# Patient Record
Sex: FEMALE | Race: WHITE | Employment: OTHER | URBAN - METROPOLITAN AREA
[De-identification: names, ages, dates, MRNs, and addresses within clinical notes are randomized per-mention and may not be internally consistent; named-entity substitution may affect disease eponyms.]

---

## 2018-04-06 ENCOUNTER — OFFICE VISIT (OUTPATIENT)
Dept: INTERNAL MEDICINE CLINIC | Age: 58
End: 2018-04-06

## 2018-04-06 VITALS
HEIGHT: 67 IN | HEART RATE: 78 BPM | SYSTOLIC BLOOD PRESSURE: 147 MMHG | TEMPERATURE: 98.4 F | RESPIRATION RATE: 17 BRPM | DIASTOLIC BLOOD PRESSURE: 105 MMHG | BODY MASS INDEX: 34.53 KG/M2 | WEIGHT: 220 LBS | OXYGEN SATURATION: 97 %

## 2018-04-06 DIAGNOSIS — Z12.4 CERVICAL CANCER SCREENING: ICD-10-CM

## 2018-04-06 DIAGNOSIS — I10 ESSENTIAL HYPERTENSION: Primary | ICD-10-CM

## 2018-04-06 DIAGNOSIS — F41.8 ANXIETY WITH DEPRESSION: ICD-10-CM

## 2018-04-06 DIAGNOSIS — Z12.11 COLON CANCER SCREENING: ICD-10-CM

## 2018-04-06 RX ORDER — TIMOLOL MALEATE 5 MG/ML
SOLUTION/ DROPS OPHTHALMIC
COMMUNITY
Start: 2017-07-28

## 2018-04-06 RX ORDER — PAROXETINE 10 MG/1
10 TABLET, FILM COATED ORAL DAILY
Qty: 30 TAB | Refills: 0 | Status: SHIPPED | OUTPATIENT
Start: 2018-04-06 | End: 2018-04-23 | Stop reason: SDUPTHER

## 2018-04-06 RX ORDER — OMEPRAZOLE 20 MG/1
20 CAPSULE, DELAYED RELEASE ORAL DAILY
COMMUNITY

## 2018-04-06 RX ORDER — PAROXETINE 10 MG/1
10 TABLET, FILM COATED ORAL DAILY
Qty: 30 TAB | Refills: 0 | Status: SHIPPED | OUTPATIENT
Start: 2018-04-06 | End: 2018-04-06 | Stop reason: SDUPTHER

## 2018-04-06 RX ORDER — LISINOPRIL 20 MG/1
20 TABLET ORAL DAILY
Qty: 30 TAB | Refills: 1 | Status: SHIPPED | OUTPATIENT
Start: 2018-04-06 | End: 2018-05-07 | Stop reason: SDUPTHER

## 2018-04-06 RX ORDER — LISINOPRIL 20 MG/1
20 TABLET ORAL DAILY
Qty: 30 TAB | Refills: 1 | Status: SHIPPED | OUTPATIENT
Start: 2018-04-06 | End: 2018-04-06 | Stop reason: SDUPTHER

## 2018-04-06 NOTE — PROGRESS NOTES
Chief Complaint   Patient presents with    Hypertension     Pt who presents for new problem of hypertension. Diet and Lifestyle: not attempting to follow a low fat, low cholesterol diet, not attempting to follow a low sodium diet  Home BP Monitoring: is not measured at home  Use of agents associated with hypertension: none. Cardiovascular ROS: taking medications as instructed, no medication side effects noted, no TIA's, no chest pain on exertion, no dyspnea on exertion, no swelling of ankles. New concerns: weight gain. Reviewed and agree with Nurse Note and duplicated in this note. Reviewed PmHx, RxHx, FmHx, SocHx, AllgHx and updated and dated in the chart. No family history on file. No past medical history on file.    Social History     Social History    Marital status:      Spouse name: N/A    Number of children: N/A    Years of education: N/A     Social History Main Topics    Smoking status: Not on file    Smokeless tobacco: Not on file    Alcohol use Not on file    Drug use: Not on file    Sexual activity: Not on file     Other Topics Concern    Not on file     Social History Narrative    No narrative on file        Review of Systems - negative except as listed above      Objective:     Vitals:    04/06/18 0940   BP: (!) 147/105   Pulse: 78   Resp: 17   Temp: 98.4 °F (36.9 °C)   TempSrc: Oral   SpO2: 97%   Weight: 220 lb (99.8 kg)   Height: 5' 7\" (1.702 m)       Physical Examination: General appearance - alert, well appearing, and in no distress  Eyes - pupils equal and reactive, extraocular eye movements intact  Ears - bilateral TM's and external ear canals normal  Nose - normal and patent, no erythema, discharge or polyps  Mouth - mucous membranes moist, pharynx normal without lesions  Neck - supple, no significant adenopathy  Chest - clear to auscultation, no wheezes, rales or rhonchi, symmetric air entry  Heart - normal rate, regular rhythm, normal S1, S2, no murmurs, rubs, clicks or gallops  Abdomen - soft, nontender, nondistended, no masses or organomegaly  Skin - normal coloration and turgor, no rashes, no suspicious skin lesions noted    Assessment/ Plan:   Diagnoses and all orders for this visit:    1. Essential hypertension  -     METABOLIC PANEL, COMPREHENSIVE    2. Colon cancer screening  -     OCCULT BLOOD, IMMUNOASSAY (FIT)    3. Anxiety with depression    4. Cervical cancer screening  Sujatha Babb OB/GYN ref [facility]    Other orders  -     PARoxetine (PAXIL) 10 mg tablet; Take 1 Tab by mouth daily. -     lisinopril (PRINIVIL, ZESTRIL) 20 mg tablet; Take 1 Tab by mouth daily. Follow-up Disposition: Not on File  Patient was informed/counseled to:    1) Remember to stay active and/or exercise regularly (I suggest 30-45 minutes daily)   2) For reliable dietary information, go to www. [employer].org. You may wish to consider seeing the nutritionist at [facility] [phone], also consider the [address] [facility]. 3) I routinely suggest a complete physical exam once each year (your birth month)  I have discussed the diagnosis with the patient and the intended plan as seen in the above orders. The patient has received an after-visit summary and questions were answered concerning future plans. Medication Side Effects and Warnings were discussed with patient: yes  Patient Labs were reviewed and or requested: yes  Patient Past Records were reviewed and or requested  yes  I have discussed the diagnosis with the patient and the intended plan as seen in the above orders. The patient has received an after-visit summary and questions were answered concerning future plans. Pt agrees to call or return to clinic and/or go to closest ER with any worsening of symptoms. This may include, but not limited to increased fever (>100.4) with NSAIDS or Tylenol, increased edema, confusion, rash, worsening of presenting symptoms.

## 2018-04-06 NOTE — MR AVS SNAPSHOT
[address] [facility]. [address] [address] 
[phone] Patient: Carmen Rosales MRN: [id number] SQH:[birth year] Visit Information Date & Time Provider Department Dept. Phone Encounter #  
 4/6/2018  9:45 AM 24[facility] Jessica Leavitt 80 Sports Medicine and Primary Care [phone] [id number] Follow-up Instructions Return in about 2 weeks (around 4/20/2018) for anxiety, Blood Pressure Check. Follow-up and Disposition History Upcoming Health Maintenance Date Due  
 PAP AKA CERVICAL CYTOLOGY 8/25/1980 FOBT Q 1 YEAR AGE 50-75 8/25/2009 BREAST CANCER SCRN MAMMOGRAM 10/13/2018 DTaP/Tdap/Td series (2 - Td) 9/30/2026 Allergies as of 4/6/2018  Review Complete On: 4/6/2018 By: 2400 [facility] MD Dagmar  
 No Known Allergies Current Immunizations  Reviewed on 9/30/2016 Name Date Influenza Vaccine 1/12/2018 Influenza Vaccine (Quad) PF 9/30/2016 Tdap 9/30/2016 Not reviewed this visit You Were Diagnosed With   
  
 Codes Comments Essential hypertension    -  Primary ICD-10-CM: I10 
ICD-9-CM: 401.9 Colon cancer screening     ICD-10-CM: Z12.11 ICD-9-CM: V76.51 Anxiety with depression     ICD-10-CM: F41.8 ICD-9-CM: 300.4 Cervical cancer screening     ICD-10-CM: Z12.4 ICD-9-CM: V76.2 Vitals BP Pulse Temp Resp Height(growth percentile) Weight(growth percentile) (!) 147/105 (BP 1 Location: Left arm, BP Patient Position: Sitting) 78 98.4 °F (36.9 °C) (Oral) 17 5' 7\" (1.702 m) 220 lb (99.8 kg) SpO2 BMI OB Status 97% 34.46 kg/m2 Menopause Vitals History BMI and BSA Data Body Mass Index Body Surface Area 34.46 kg/m 2 2.17 m 2 Preferred Pharmacy Pharmacy Name Phone [facility] AT [address] [place] [address] AT [address] [place] [address] [phone] Your Updated Medication List  
  
   
 This list is accurate as of 4/6/18 10:27 AM.  Always use your most recent med list.  
  
  
  
  
 lisinopril 20 mg tablet Commonly known as:  Margaret Bills Take 1 Tab by mouth daily. omeprazole 20 mg capsule Commonly known as:  PRILOSEC Take 20 mg by mouth daily. PARoxetine 10 mg tablet Commonly known as:  PAXIL Take 1 Tab by mouth daily. timolol 0.5 % ophthalmic solution Commonly known as:  TIMOPTIC INSTILL 1 DROP IN RIGHT EYE IN THE MORNING Prescriptions Printed Refills PARoxetine (PAXIL) 10 mg tablet 0 Sig: Take 1 Tab by mouth daily. Class: Print Route: Oral  
 lisinopril (PRINIVIL, ZESTRIL) 20 mg tablet 1 Sig: Take 1 Tab by mouth daily. Class: Print Route: Oral  
  
We Performed the Following METABOLIC PANEL, COMPREHENSIVE [64387 CPT(R)] OCCULT BLOOD, IMMUNOASSAY (FIT) [id number] CPT(R)] REFERRAL TO OBSTETRICS AND GYNECOLOGY [REF51 Custom] Follow-up Instructions Return in about 2 weeks (around 4/20/2018) for anxiety, Blood Pressure Check. Referral Information Referral ID Referred By Referred To  
  
 [id number] [place], [facility], MD [address] [facility], [facility] Phone: [phone] Fax: [phone] Visits Status Start Date End Date 1 New Request 4/6/18 4/6/19 If your referral has a status of pending review or denied, additional information will be sent to support the outcome of this decision. Introducing [facility] HE[facility] SERVICES! [employer] introduces [employer] patient portal. Now you can access parts of your medical record, email your doctor's office, and request medication refills online. 1. In your internet browser, go to [email or web address] 2. Click on the First Time User? Click Here link in the Sign In box. You will see the New Member Sign Up page. 3. Enter your [employer] Access Code exactly as it appears below. You will not need to use this code after youve completed the sign-up process. If you do not sign up before the expiration date, you must request a new code. · [employer] Access Code: [email or web address] Expires: 7/5/2018 10:27 AM 
 
4. Enter the last four digits of your Social Security Number (xxxx) and Date of Birth (mm/dd/yyyy) as indicated and click Submit. You will be taken to the next sign-up page. 5. Create a [employer] ID. This will be your [employer] login ID and cannot be changed, so think of one that is secure and easy to remember. 6. Create a [employer] password. You can change your password at any time. 7. Enter your Password Reset Question and Answer. This can be used at a later time if you forget your password. 8. Enter your e-mail address. You will receive e-mail notification when new information is available in [address]. 9. Click Sign Up. You can now view and download portions of your medical record. 10. Click the Download Summary menu link to download a portable copy of your medical information. If you have questions, please visit the Frequently Asked Questions section of the [employer] website. Remember, [employer] is NOT to be used for urgent needs. For medical emergencies, dial 911. Now available from your iPhone and Android! Please provide this summary of care documentation to your next provider. Your primary care clinician is listed as Cleopatra Escobar. If you have any questions after today's visit, please call [phone].

## 2018-04-07 LAB
ALBUMIN SERPL-MCNC: 4.1 G/DL (ref 3.5–5.5)
ALBUMIN/GLOB SERPL: 1.4 {RATIO} (ref 1.2–2.2)
ALP SERPL-CCNC: 95 IU/L (ref 39–117)
ALT SERPL-CCNC: 28 IU/L (ref 0–32)
AST SERPL-CCNC: 21 IU/L (ref 0–40)
BILIRUB SERPL-MCNC: 0.3 MG/DL (ref 0–1.2)
BUN SERPL-MCNC: 23 MG/DL (ref 6–24)
BUN/CREAT SERPL: 23 (ref 9–23)
CALCIUM SERPL-MCNC: 9.4 MG/DL (ref 8.7–10.2)
CHLORIDE SERPL-SCNC: 104 MMOL/L (ref 96–106)
CO2 SERPL-SCNC: 25 MMOL/L (ref 18–29)
CREAT SERPL-MCNC: 1.01 MG/DL (ref 0.57–1)
GFR SERPLBLD CREATININE-BSD FMLA CKD-EPI: 62 ML/MIN/1.73
GFR SERPLBLD CREATININE-BSD FMLA CKD-EPI: 71 ML/MIN/1.73
GLOBULIN SER CALC-MCNC: 2.9 G/DL (ref 1.5–4.5)
GLUCOSE SERPL-MCNC: 109 MG/DL (ref 65–99)
POTASSIUM SERPL-SCNC: 5.1 MMOL/L (ref 3.5–5.2)
PROT SERPL-MCNC: 7 G/DL (ref 6–8.5)
SODIUM SERPL-SCNC: 142 MMOL/L (ref 134–144)

## 2018-04-23 ENCOUNTER — CLINICAL SUPPORT (OUTPATIENT)
Dept: INTERNAL MEDICINE CLINIC | Age: 58
End: 2018-04-23

## 2018-04-23 VITALS
SYSTOLIC BLOOD PRESSURE: 134 MMHG | WEIGHT: 214.6 LBS | HEIGHT: 67 IN | DIASTOLIC BLOOD PRESSURE: 91 MMHG | BODY MASS INDEX: 33.68 KG/M2

## 2018-04-23 DIAGNOSIS — I10 ESSENTIAL HYPERTENSION, BENIGN: Primary | ICD-10-CM

## 2018-04-23 RX ORDER — PAROXETINE 10 MG/1
10 TABLET, FILM COATED ORAL DAILY
Qty: 30 TAB | Refills: 0 | Status: SHIPPED | OUTPATIENT
Start: 2018-04-23 | End: 2018-06-08 | Stop reason: DRUGHIGH

## 2018-04-23 NOTE — MR AVS SNAPSHOT
303 [facility] 
 
 
 [address] [address] 
[phone] Patient: Alesha Peck MRN: [id number] LJD:[birth year] Visit Information Date & Time Provider Department Dept. Phone Encounter #  
 4/23/2018  9:30 [facility] [phone] [id number] Your Appointments 4/27/2018  9:20 AM  
New Patient with Dania Cordova MD  
[facility]  [facility] ([facility]) Appt Note: NG with pap  
 [address], [place] [address] [address]  
100 [facility], [address] [place] [address]  
  
    
 5/7/2018  3:15 PM  
Any with Amarilys Phoenix MD  
[address] [place] [address] and Primary Care [facility]) Appt Note: 2 week follow up  
 [address] [phone]  
  
   
 [address] [address] Upcoming Health Maintenance Date Due  
 PAP AKA CERVICAL CYTOLOGY 8/25/1980 FOBT Q 1 YEAR AGE 50-75 8/25/2009 BREAST CANCER SCRN MAMMOGRAM 10/13/2018 DTaP/Tdap/Td series (2 - Td) 9/30/2026 Allergies as of 4/23/2018  Review Complete On: 4/6/2018 By: Amarilys Phoenix MD  
 No Known Allergies Current Immunizations  Reviewed on 9/30/2016 Name Date Influenza Vaccine 1/12/2018 Influenza Vaccine (Quad) PF 9/30/2016 Tdap 9/30/2016 Not reviewed this visit Vitals BP Height(growth percentile) Weight(growth percentile) BMI OB Status (!) 134/91 (BP 1 Location: Left arm, BP Patient Position: Sitting) 5' 7\" (1.702 m) 214 lb 9.6 oz (97.3 kg) 33.61 kg/m2 Menopause BMI and BSA Data Body Mass Index Body Surface Area  
 33.61 kg/m 2 2.14 m 2 Preferred Pharmacy Pharmacy Name Phone [facility] AT [facility] AT [address] [facility] [phone] Your Updated Medication List  
  
   
 This list is accurate as of 4/23/18  9:36 AM.  Always use your most recent med list.  
  
  
  
  
 lisinopril 20 mg tablet Commonly known as:  Velora Tramaine Take 1 Tab by mouth daily. omeprazole 20 mg capsule Commonly known as:  PRILOSEC Take 20 mg by mouth daily. PARoxetine 10 mg tablet Commonly known as:  PAXIL Take 1 Tab by mouth daily. timolol 0.5 % ophthalmic solution Commonly known as:  TIMOPTIC INSTILL 1 DROP IN RIGHT EYE IN THE MORNING Introducing [facility] SERVICES! Dear Lauree Moritz: Thank you for requesting a [facility] account. Our records indicate that you already have an active [facility] account. You can access your account anytime at [email or web address]. [email or web address] Did you know that you can access your hospital and ER discharge instructions at any time in [facility]? You can also review all of your test results from your hospital stay or ER visit. Additional Information If you have questions, please visit the Frequently Asked Questions section of the [facility] website at [email or web address]/. Remember, [facility] is NOT to be used for urgent needs. For medical emergencies, dial 911. Now available from your iPhone and Android! Please provide this summary of care documentation to your next provider. Your primary care clinician is listed as Miles Purcell. If you have any questions after today's visit, please call [phone].

## 2018-04-23 NOTE — PROGRESS NOTES
Patient presents to office for blood pressure check. Blood pressure above normal at 134/91. Dr. Chelo James notified and advised patient to follow up in 2 weeks as her blood pressure has decreased.

## 2018-04-27 ENCOUNTER — OFFICE VISIT (OUTPATIENT)
Dept: OBGYN CLINIC | Age: 58
End: 2018-04-27

## 2018-04-27 VITALS
WEIGHT: 215.4 LBS | SYSTOLIC BLOOD PRESSURE: 124 MMHG | HEIGHT: 67 IN | DIASTOLIC BLOOD PRESSURE: 86 MMHG | BODY MASS INDEX: 33.81 KG/M2

## 2018-04-27 DIAGNOSIS — Z12.31 SCREENING MAMMOGRAM, ENCOUNTER FOR: ICD-10-CM

## 2018-04-27 DIAGNOSIS — Z01.419 WELL WOMAN EXAM: Primary | ICD-10-CM

## 2018-04-27 DIAGNOSIS — Z11.51 SCREENING FOR HUMAN PAPILLOMAVIRUS: ICD-10-CM

## 2018-04-27 NOTE — PATIENT INSTRUCTIONS
Pelvic Exam: Care Instructions  Your Care Instructions    When your doctor examines all of your pelvic organs, it's called a pelvic exam. Two good reasons to have this kind of exam are to check for sexually transmitted infections (STIs) and to get a Pap test. A Pap test is also called a Pap smear. It checks for early changes that can lead to cancer of the cervix. Sometimes a pelvic exam is part of a regular checkup. In this case, you can do some things to make your test results as accurate as possible. · Try to schedule the exam when you don't have your period. · Don't use douches, tampons, or vaginal medicines, sprays, or powders for 24 hours before your exam.  · Don't have sex for 24 hours before your exam.  Other times, women have this kind of exam at any time of the month. This is because they have pelvic pain, bleeding, or discharge. Or they may have another pelvic problem. Before your exam, it's important to share some information with your doctor. For example, if you are a survivor of rape or sexual abuse, you can talk about any concerns you may have. Your doctor will also want to know if you are pregnant or use birth control. And he or she will want to hear about any problems, surgeries, or procedures you have had in your pelvic area. You will also need to tell your doctor when your last period was. Follow-up care is a key part of your treatment and safety. Be sure to make and go to all appointments, and call your doctor if you are having problems. It's also a good idea to know your test results and keep a list of the medicines you take. How is a pelvic exam done? · During a pelvic exam, you will:  ¨ Take off your clothes below the waist. You will get a paper or cloth cover to put over the lower half of your body. Clement Carrillo on your back on an exam table. Your feet will be raised above you. Stirrups will support your feet. · The doctor will:  Jose F Mae you to relax your knees.  Your knees need to lean out, toward the walls. ¨ Check the opening of your vagina for sores or swelling. ¨ Gently put a tool called a speculum into your vagina. It opens the vagina a little bit. You will feel some pressure. But if you are relaxed, it will not hurt. It lets your doctor see inside the vagina. ¨ Use a small brush, spatula, or swab to get a sample of cells, if you are having a Pap test or culture. The doctor then removes the speculum. ¨ Put on gloves and put one or two fingers of one hand into your vagina. The other hand goes on your lower belly. This lets your doctor feel your pelvic organs. You will probably feel some pressure. Try to stay relaxed. ¨ Put one gloved finger into your rectum and one into your vagina, if needed. This can also help check your pelvic organs. This exam takes about 10 minutes. At the end, you will get a washcloth or tissue to clean your vaginal area. It's normal to have some discharge after this exam. You can then get dressed. Some test results may be ready right away. But results from a culture or a Pap test may take several days or a few weeks. Why should you have a pelvic exam?  · You want to have recommended screening tests. This includes a Pap test.  · You think you have a vaginal infection. Signs include itching, burning, or unusual discharge. · You might have been exposed to a sexually transmitted infection (STI), such as chlamydia or herpes. · You have vaginal bleeding that is not part of your normal menstrual period. · You have pain in your belly or pelvis. · You have been sexually assaulted. A pelvic exam lets your doctor collect evidence and check for STIs. · You are pregnant. · You are having trouble getting pregnant. What are the risks of a pelvic exam?  There are no risks from a pelvic exam.  When should you call for help? Watch closely for changes in your health, and be sure to contact your doctor if you have any problems. Where can you learn more?   Go to http://cassie-barrington.info/. Enter N752 in the search box to learn more about \"Pelvic Exam: Care Instructions. \"  Current as of: October 13, 2016  Content Version: 11.4  © 4509-6685 Healthwise, [employer] Care instructions adapted under license by [employer] (which disclaims liability or warranty for this information). If you have questions about a medical condition or this instruction, always ask your healthcare professional. [place] [address] any warranty or liability for your use of this information.

## 2018-04-27 NOTE — PROGRESS NOTES
Annual exam    Paula Alfonso is a 61 y.o.  A0 presenting for annual exam. Patient without complaint. Menopause early 46s. No further VMS, denies vaginal dryness, or PMB. Doing well. Female partner. She is from [place], Works in [address] [place] [address] for [employer], retiring this year. Ob/Gyn Hx:   A0 -  Menopause- age 51's  ? VMS- denies  ? Vag dryness- denies  ? HRT- never  STI- denies  ? SA- yes    Health maintenance:  Pap- unsure when last pap was (many years ago), no history of abnormal per patient  Mammo- 2017, normal per patient  Colonoscopy- 2017, repeat in 5 years per patient  Dexa- not indicated    Past Medical History:   Diagnosis Date    Hypertension        Past Surgical History:   Procedure Laterality Date    HX OTHER SURGICAL Right 1981    eye       Family History   Problem Relation Age of Onset    Macular Degen Mother     Hypertension Father        Social History     Social History    Marital status:      Spouse name: N/A    Number of children: N/A    Years of education: N/A     Occupational History    Not on file. Social History Main Topics    Smoking status: Never Smoker    Smokeless tobacco: Never Used    Alcohol use Yes      Comment: 2 drinks per week    Drug use: No    Sexual activity: Yes     Partners: Female     Birth control/ protection: None     Other Topics Concern    Not on file     Social History Narrative       Current Outpatient Prescriptions   Medication Sig Dispense Refill    PARoxetine (PAXIL) 10 mg tablet Take 1 Tab by mouth daily. 30 Tab 0    timolol (TIMOPTIC) 0.5 % ophthalmic solution INSTILL 1 DROP IN RIGHT EYE IN THE MORNING      lisinopril (PRINIVIL, ZESTRIL) 20 mg tablet Take 1 Tab by mouth daily. 30 Tab 1    omeprazole (PRILOSEC) 20 mg capsule Take 20 mg by mouth daily.          No Known Allergies    Review of Systems - History obtained from the patient  Constitutional: negative for weight loss, fever, night sweats  HEENT: negative for hearing loss, earache, congestion, snoring, sorethroat  CV: negative for chest pain, palpitations, edema  Resp: negative for cough, shortness of breath, wheezing  GI: negative for change in bowel habits, abdominal pain, black or bloody stools  : negative for frequency, dysuria, hematuria, vaginal discharge  MSK: negative for back pain, joint pain, muscle pain  Breast: negative for breast lumps, nipple discharge, galactorrhea  Skin :negative for itching, rash, hives  Neuro: negative for dizziness, headache, confusion, weakness  Psych: negative for anxiety, depression, change in mood  Heme/lymph: negative for bleeding, bruising, pallor    Physical Exam    Visit Vitals    /86 (BP 1 Location: Left arm, BP Patient Position: Sitting)    Ht 5' 7\" (1.702 m)    Wt 215 lb 6.4 oz (97.7 kg)    BMI 33.74 kg/m2       Constitutional  · Appearance: well-nourished, well developed, alert, in no acute distress    HENT  · Head and Face: appears normal    Neck  · Inspection/Palpation: normal appearance, no masses or tenderness  · Lymph Nodes: no lymphadenopathy present  · Thyroid: gland size normal, nontender, no nodules or masses present on palpation    Chest  · Respiratory Effort: non-labored breathing  · Auscultation: CTAB, normal breath sounds    Cardiovascular  · Heart:  · Auscultation: regular rate and rhythm without murmur  · Extremities: no peripheral edema    Breasts  · Inspection of Breasts: breasts symmetrical, no skin changes, no discharge present, nipple appearance normal, no skin retraction present  · Palpation of Breasts and Axillae: no masses present on palpation, no breast tenderness  · Axillary Lymph Nodes: no lymphadenopathy present    Gastrointestinal  · Abdominal Examination: abdomen non-tender to palpation, normal bowel sounds, no masses present  · Liver and spleen: no hepatomegaly present, spleen not palpable  · Hernias: no hernias identified    Genitourinary  · External Genitalia: normal appearance for age, no discharge present, no tenderness present, no inflammatory lesions present, no masses present, +atrophy of UG mucosa  · Vagina: normal vaginal vault without central or paravaginal defects, no discharge present, no inflammatory lesions present, no masses present  · Bladder: non-tender to palpation  · Urethra: appears normal  · Cervix: normal   · Uterus: normal size, shape and consistency, small, mobile  · Adnexa: no adnexal tenderness present, no adnexal masses present  · Perineum: perineum within normal limits, no evidence of trauma, no rashes or skin lesions present    Skin  · General Inspection: no rash, no lesions identified    Neurologic/Psychiatric  · Mental Status:  · Orientation: grossly oriented to person, place and time  · Mood and Affect: mood normal, affect appropriate      Assessment/Plan:  61 y.o. postmenopausal female presenting for annual exam. Overall doing well.      Health Maintenance:  -counseled re: diet, exercise, healthy lifestyle  -pap/HPV today  -mammo and colonoscopy utd  -declines STI screen  -dexa not yet indicated    RTC: 1 year for annual wellness assessment, or sooner prn for problems or concerns  -handouts and instructions provided    Marguerite Valdez MD  4/27/2018  9:56 AM

## 2018-05-02 LAB
CYTOLOGIST CVX/VAG CYTO: NORMAL
CYTOLOGY CVX/VAG DOC THIN PREP: NORMAL
DX ICD CODE: NORMAL
HPV I/H RISK 4 DNA CVX QL PROBE+SIG AMP: NEGATIVE
Lab: NORMAL
OTHER STN SPEC: NORMAL
PATH REPORT.FINAL DX SPEC: NORMAL
PATHOLOGIST CVX/VAG CYTO: NORMAL
STAT OF ADQ CVX/VAG CYTO-IMP: NORMAL

## 2018-05-07 RX ORDER — LISINOPRIL 20 MG/1
20 TABLET ORAL DAILY
Qty: 90 TAB | Refills: 0 | Status: SHIPPED | OUTPATIENT
Start: 2018-05-07 | End: 2018-06-05 | Stop reason: SDUPTHER

## 2018-05-18 ENCOUNTER — OFFICE VISIT (OUTPATIENT)
Dept: INTERNAL MEDICINE CLINIC | Age: 58
End: 2018-05-18

## 2018-05-18 VITALS
HEIGHT: 67 IN | DIASTOLIC BLOOD PRESSURE: 84 MMHG | HEART RATE: 74 BPM | TEMPERATURE: 99.4 F | BODY MASS INDEX: 33.3 KG/M2 | WEIGHT: 212.2 LBS | SYSTOLIC BLOOD PRESSURE: 127 MMHG | OXYGEN SATURATION: 94 % | RESPIRATION RATE: 16 BRPM

## 2018-05-18 DIAGNOSIS — I10 HYPERTENSION, UNSPECIFIED TYPE: Primary | ICD-10-CM

## 2018-05-18 DIAGNOSIS — F41.9 ANXIETY: ICD-10-CM

## 2018-05-18 RX ORDER — PAROXETINE HYDROCHLORIDE 20 MG/1
20 TABLET, FILM COATED ORAL DAILY
Qty: 30 TAB | Refills: 3 | Status: SHIPPED | OUTPATIENT
Start: 2018-05-18 | End: 2018-06-11 | Stop reason: SDUPTHER

## 2018-05-18 NOTE — MR AVS SNAPSHOT
[address] [facility]. [place] [address] [address] 
[phone] Patient: Darius Bloom MRN: [id number] ZLO:[id number] Visit Information Date & Time Provider Department Dept. Phone Encounter #  
 5/18/2018  9:30 AM Amarilys Phoenix MD [facility] and [place] [id number] Follow-up Instructions Return in about 2 weeks (around 6/1/2018) for anxiety. Follow-up and Disposition History Upcoming Health Maintenance Date Due FOBT Q 1 YEAR AGE 50-75 5/18/2019* Influenza Age 5 to Adult 8/1/2018 BREAST CANCER SCRN MAMMOGRAM 10/13/2018 PAP AKA CERVICAL CYTOLOGY 4/27/2021 DTaP/Tdap/Td series (2 - Td) 9/30/2026 *Topic was postponed. The date shown is not the original due date. Allergies as of 5/18/2018  Review Complete On: 5/18/2018 By: Amarilys Phoenix MD  
 No Known Allergies Current Immunizations  Reviewed on 9/30/2016 Name Date Influenza Vaccine 1/12/2018 Influenza Vaccine (Quad) PF 9/30/2016 Tdap 9/30/2016 Not reviewed this visit You Were Diagnosed With   
  
 Codes Comments Hypertension, unspecified type    -  Primary ICD-10-CM: I10 
ICD-9-CM: 401.9 Anxiety     ICD-10-CM: F41.9 ICD-9-CM: 300.00 Vitals BP Pulse Temp Resp Height(growth percentile) Weight(growth percentile) 127/84 (BP 1 Location: Right arm, BP Patient Position: Sitting) 74 99.4 °F (37.4 °C) (Oral) 16 5' 7\" (1.702 m) 212 lb 3.2 oz (96.3 kg) SpO2 BMI OB Status Smoking Status 94% 33.24 kg/m2 Menopause Never Smoker Vitals History BMI and BSA Data Body Mass Index Body Surface Area  
 33.24 kg/m 2 2.13 m 2 Preferred Pharmacy Pharmacy Name Phone [facility] AT [address] [place] [address] AT [address] [place] [address] [phone] Your Updated Medication List  
  
   
 This list is accurate as of 5/18/18  4:41 PM.  Always use your most recent med list.  
  
  
  
  
 lisinopril 20 mg tablet Commonly known as:  Pecola Cypher Take 1 Tab by mouth daily. omeprazole 20 mg capsule Commonly known as:  PRILOSEC Take 20 mg by mouth daily. * PARoxetine 10 mg tablet Commonly known as:  PAXIL Take 1 Tab by mouth daily. * PARoxetine 20 mg tablet Commonly known as:  PAXIL Take 1 Tab by mouth daily. timolol 0.5 % ophthalmic solution Commonly known as:  TIMOPTIC INSTILL 1 DROP IN RIGHT EYE IN THE MORNING  
  
 * Notice: This list has 2 medication(s) that are the same as other medications prescribed for you. Read the directions carefully, and ask your doctor or other care provider to review them with you. Prescriptions Sent to Pharmacy Refills PARoxetine (PAXIL) 20 mg tablet 3 Sig: Take 1 Tab by mouth daily. Class: Normal  
 Pharmacy: [facility] at [address]  AT [address] [place] [address] Ph #: [phone] Route: Oral  
  
Follow-up Instructions Return in about 2 weeks (around 6/1/2018) for anxiety. Introducing [facility]! Dear Oralia Verduzco: Thank you for requesting a [facility] account. Our records indicate that you already have an active [facility] account. You can access your account anytime at [email or web address] Did you know that you can access your hospital and ER discharge instructions at any time in [facility]? You can also review all of your test results from your hospital stay or ER visit. Additional Information If you have questions, please visit the Frequently Asked Questions section of the [facility] website at [email or web address] Remember, [facility] is NOT to be used for urgent needs. For medical emergencies, dial 911. Now available from your iPhone and Android! Please provide this summary of care documentation to your next provider. Your primary care clinician is listed as Gisella Calvo. If you have any questions after today's visit, please call [phone].

## 2018-05-18 NOTE — PROGRESS NOTES
Chief Complaint   Patient presents with    Blood Pressure Check    Anxiety     she is a 61y.o. year old female who presents for follow-up of   Anxiety and/or Depression  Well controlled on Paxil and no other therapies. Ongoing symptoms include: still worrying about everything but has dialed it down a bit   Patient denies: chest pain, shortness of breath, racing thoughts, feelings of losing control, difficulty concentrating, suicidal ideation, homocidal ideation. Reported side effects from the treatment: none. Blood pressure check as well since starting lisinopril. Doing well with meds, no side effects    Reviewed and agree with Nurse Note and duplicated in this note. Reviewed PmHx, RxHx, FmHx, SocHx, AllgHx and updated and dated in the chart.     Family History   Problem Relation Age of Onset    Macular Degen Mother     Hypertension Father        Past Medical History:   Diagnosis Date    Hypertension       Social History     Social History    Marital status:      Spouse name: N/A    Number of children: N/A    Years of education: N/A     Social History Main Topics    Smoking status: Never Smoker    Smokeless tobacco: Never Used    Alcohol use Yes      Comment: 2 drinks per week    Drug use: No    Sexual activity: Yes     Partners: Female     Birth control/ protection: None     Other Topics Concern    Not on file     Social History Narrative        Review of Systems - negative except as listed above      Objective:     Vitals:    05/18/18 0920   BP: 127/84   Pulse: 74   Resp: 16   Temp: 99.4 °F (37.4 °C)   TempSrc: Oral   SpO2: 94%   Weight: 212 lb 3.2 oz (96.3 kg)   Height: 5' 7\" (1.702 m)       Physical Examination: General appearance - alert, well appearing, and in no distress  Eyes - pupils equal and reactive, extraocular eye movements intact  Ears - bilateral TM's and external ear canals normal  Nose - normal and patent, no erythema, discharge or polyps  Mouth - mucous membranes moist, pharynx normal without lesions  Neck - supple, no significant adenopathy  Chest - clear to auscultation, no wheezes, rales or rhonchi, symmetric air entry  Heart - normal rate, regular rhythm, normal S1, S2, no murmurs, rubs, clicks or gallops  Abdomen - soft, nontender, nondistended, no masses or organomegaly  Extremities - peripheral pulses normal, no pedal edema, no clubbing or cyanosis  Skin - normal coloration and turgor, no rashes, no suspicious skin lesions noted    Assessment/ Plan:   Diagnoses and all orders for this visit:    1. Hypertension, unspecified type    2. Anxiety    Other orders  -     PARoxetine (PAXIL) 20 mg tablet; Take 1 Tab by mouth daily. Follow-up Disposition:  Return in about 2 weeks (around 6/1/2018) for anxiety. I have discussed the diagnosis with the patient and the intended plan as seen in the above orders. The patient has received an after-visit summary and questions were answered concerning future plans. Medication Side Effects and Warnings were discussed with patient: yes  Patient Labs were reviewed and or requested: yes  Patient Past Records were reviewed and or requested  yes  I have discussed the diagnosis with the patient and the intended plan as seen in the above orders. The patient has received an after-visit summary and questions were answered concerning future plans. Pt agrees to call or return to clinic and/or go to closest ER with any worsening of symptoms. This may include, but not limited to increased fever (>100.4) with NSAIDS or Tylenol, increased edema, confusion, rash, worsening of presenting symptoms. 1) Remember to stay active and/or exercise regularly (I suggest 30-45 minutes daily)   2) For reliable dietary information, go to www. EATRIGHT.org. You may wish to consider seeing the nutritionist at [facility] at #[phone] or [phone], also consider the [address]   3) I routinely suggest a complete physical exam once each year (your birth month)      1. Have you been to the ER, urgent care clinic since your last visit? Hospitalized since your last visit? No    2. Have you seen or consulted any other health care providers outside of the [address] [place] [address] since your last visit? Include any pap smears or colon screening.  No

## 2018-06-05 RX ORDER — LISINOPRIL 20 MG/1
20 TABLET ORAL DAILY
Qty: 90 TAB | Refills: 3 | Status: SHIPPED | OUTPATIENT
Start: 2018-06-05 | End: 2019-04-30 | Stop reason: SDUPTHER

## 2018-06-08 ENCOUNTER — OFFICE VISIT (OUTPATIENT)
Dept: INTERNAL MEDICINE CLINIC | Age: 58
End: 2018-06-08

## 2018-06-08 VITALS
HEIGHT: 67 IN | SYSTOLIC BLOOD PRESSURE: 127 MMHG | WEIGHT: 206.2 LBS | BODY MASS INDEX: 32.36 KG/M2 | DIASTOLIC BLOOD PRESSURE: 84 MMHG | OXYGEN SATURATION: 95 % | HEART RATE: 80 BPM | RESPIRATION RATE: 17 BRPM | TEMPERATURE: 99.4 F

## 2018-06-08 DIAGNOSIS — F41.9 ANXIETY: Primary | ICD-10-CM

## 2018-06-08 NOTE — PROGRESS NOTES
Chief Complaint   Patient presents with    Anxiety     she is a 61y.o. year old female who presents for follow-up of   Anxiety and/or Depression  Well controlled on Paxil and no other therapies. Ongoing symptoms include: psychomotor agitation. Patient denies: suicidal ideation, homocidal ideation. Reported side effects from the treatment: none. Reviewed and agree with Nurse Note and duplicated in this note. Reviewed PmHx, RxHx, FmHx, SocHx, AllgHx and updated and dated in the chart.     Family History   Problem Relation Age of Onset    Macular Degen Mother     Hypertension Father        Past Medical History:   Diagnosis Date    Hypertension       Social History     Social History    Marital status:      Spouse name: N/A    Number of children: N/A    Years of education: N/A     Social History Main Topics    Smoking status: Never Smoker    Smokeless tobacco: Never Used    Alcohol use Yes      Comment: 2 drinks per week    Drug use: No    Sexual activity: Yes     Partners: Female     Birth control/ protection: None     Other Topics Concern    None     Social History Narrative        Review of Systems - negative except as listed above      Objective:     Vitals:    06/08/18 1539   BP: 127/84   Pulse: 80   Resp: 17   Temp: 99.4 °F (37.4 °C)   TempSrc: Oral   SpO2: 95%   Weight: 206 lb 3.2 oz (93.5 kg)   Height: 5' 7\" (1.702 m)       Physical Examination: General appearance - alert, well appearing, and in no distress  Eyes - pupils equal and reactive, extraocular eye movements intact  Ears - bilateral TM's and external ear canals normal  Nose - normal and patent, no erythema, discharge or polyps  Mouth - mucous membranes moist, pharynx normal without lesions  Neck - supple, no significant adenopathy  Chest - clear to auscultation, no wheezes, rales or rhonchi, symmetric air entry  Heart - normal rate, regular rhythm, normal S1, S2, no murmurs, rubs, clicks or gallops  Abdomen - soft, nontender, nondistended, no masses or organomegaly  Back exam - full range of motion, no tenderness, palpable spasm or pain on motion  Neurological - alert, oriented, normal speech, no focal findings or movement disorder noted  Musculoskeletal - no joint tenderness, deformity or swelling  Extremities - peripheral pulses normal, no pedal edema, no clubbing or cyanosis  Skin - normal coloration and turgor, no rashes, no suspicious skin lesions noted    Assessment/ Plan:   Diagnoses and all orders for this visit:    1. Anxiety  Will increase Paxil from 20 mg to 40 mg  Patient monitor for any side effects and relief of anxiety    Follow-up Disposition:  Return in about 4 weeks (around 7/6/2018) for anxiety. I have discussed the diagnosis with the patient and the intended plan as seen in the above orders. The patient has received an after-visit summary and questions were answered concerning future plans. Medication Side Effects and Warnings were discussed with patient: yes  Patient Labs were reviewed and or requested: yes  Patient Past Records were reviewed and or requested  yes  I have discussed the diagnosis with the patient and the intended plan as seen in the above orders. The patient has received an after-visit summary and questions were answered concerning future plans. Pt agrees to call or return to clinic and/or go to closest ER with any worsening of symptoms. This may include, but not limited to increased fever (>100.4) with NSAIDS or Tylenol, increased edema, confusion, rash, worsening of presenting symptoms. 1) Remember to stay active and/or exercise regularly (I suggest 30-45 minutes daily)   2) For reliable dietary information, go to www. EATRIGHT.org. You may wish to consider seeing the nutritionist at [facility] at #[phone] or [phone], also consider the [address] [facility].   3) I routinely suggest a complete physical exam once each year (your birth month)

## 2018-07-11 RX ORDER — PAROXETINE HYDROCHLORIDE 40 MG/1
TABLET, FILM COATED ORAL
Qty: 90 TAB | Refills: 3 | Status: SHIPPED | OUTPATIENT
Start: 2018-07-11 | End: 2019-06-28 | Stop reason: SDUPTHER

## 2019-01-10 ENCOUNTER — APPOINTMENT (OUTPATIENT)
Dept: CT IMAGING | Age: 59
DRG: 087 | End: 2019-01-10
Attending: EMERGENCY MEDICINE
Payer: COMMERCIAL

## 2019-01-10 ENCOUNTER — HOSPITAL ENCOUNTER (INPATIENT)
Age: 59
LOS: 2 days | Discharge: HOME | DRG: 087 | End: 2019-01-12
Attending: EMERGENCY MEDICINE | Admitting: HOSPITALIST
Payer: COMMERCIAL

## 2019-01-10 DIAGNOSIS — S06.6X0A SUBARACHNOID HEMORRHAGE FOLLOWING INJURY, NO LOSS OF CONSCIOUSNESS, INITIAL ENCOUNTER (HCC): Primary | ICD-10-CM

## 2019-01-10 PROBLEM — S06.6XAA SUBARACHNOID HEMORRHAGE FOLLOWING INJURY: Status: ACTIVE | Noted: 2019-01-10

## 2019-01-10 LAB
ANION GAP SERPL CALC-SCNC: 9 MMOL/L (ref 5–15)
BASOPHILS # BLD: 0 K/UL (ref 0–0.1)
BASOPHILS NFR BLD: 0 % (ref 0–1)
BUN SERPL-MCNC: 22 MG/DL (ref 6–20)
BUN/CREAT SERPL: 21 (ref 12–20)
CALCIUM SERPL-MCNC: 9.3 MG/DL (ref 8.5–10.1)
CHLORIDE SERPL-SCNC: 107 MMOL/L (ref 97–108)
CO2 SERPL-SCNC: 25 MMOL/L (ref 21–32)
CREAT SERPL-MCNC: 1.07 MG/DL (ref 0.55–1.02)
DIFFERENTIAL METHOD BLD: NORMAL
EOSINOPHIL # BLD: 0.2 K/UL (ref 0–0.4)
EOSINOPHIL NFR BLD: 2 % (ref 0–7)
ERYTHROCYTE [DISTWIDTH] IN BLOOD BY AUTOMATED COUNT: 13.4 % (ref 11.5–14.5)
GLUCOSE SERPL-MCNC: 121 MG/DL (ref 65–100)
HCT VFR BLD AUTO: 42.2 % (ref 35–47)
HGB BLD-MCNC: 13.6 G/DL (ref 11.5–16)
IMM GRANULOCYTES # BLD AUTO: 0 K/UL (ref 0–0.04)
IMM GRANULOCYTES NFR BLD AUTO: 0 % (ref 0–0.5)
INR PPP: 1 (ref 0.9–1.1)
LYMPHOCYTES # BLD: 1.6 K/UL (ref 0.8–3.5)
LYMPHOCYTES NFR BLD: 19 % (ref 12–49)
MCH RBC QN AUTO: 30.6 PG (ref 26–34)
MCHC RBC AUTO-ENTMCNC: 32.2 G/DL (ref 30–36.5)
MCV RBC AUTO: 94.8 FL (ref 80–99)
MONOCYTES # BLD: 0.7 K/UL (ref 0–1)
MONOCYTES NFR BLD: 8 % (ref 5–13)
NEUTS SEG # BLD: 6.1 K/UL (ref 1.8–8)
NEUTS SEG NFR BLD: 71 % (ref 32–75)
NRBC # BLD: 0 K/UL (ref 0–0.01)
NRBC BLD-RTO: 0 PER 100 WBC
PLATELET # BLD AUTO: 219 K/UL (ref 150–400)
PMV BLD AUTO: 10.8 FL (ref 8.9–12.9)
POTASSIUM SERPL-SCNC: 4 MMOL/L (ref 3.5–5.1)
PROTHROMBIN TIME: 9.6 SEC (ref 9–11.1)
RBC # BLD AUTO: 4.45 M/UL (ref 3.8–5.2)
SODIUM SERPL-SCNC: 141 MMOL/L (ref 136–145)
WBC # BLD AUTO: 8.6 K/UL (ref 3.6–11)

## 2019-01-10 PROCEDURE — 85610 PROTHROMBIN TIME: CPT

## 2019-01-10 PROCEDURE — 70450 CT HEAD/BRAIN W/O DYE: CPT

## 2019-01-10 PROCEDURE — 96374 THER/PROPH/DIAG INJ IV PUSH: CPT

## 2019-01-10 PROCEDURE — 80048 BASIC METABOLIC PNL TOTAL CA: CPT

## 2019-01-10 PROCEDURE — 65270000029 HC RM PRIVATE

## 2019-01-10 PROCEDURE — 36415 COLL VENOUS BLD VENIPUNCTURE: CPT

## 2019-01-10 PROCEDURE — 99285 EMERGENCY DEPT VISIT HI MDM: CPT

## 2019-01-10 PROCEDURE — 74011250637 HC RX REV CODE- 250/637: Performed by: EMERGENCY MEDICINE

## 2019-01-10 PROCEDURE — 85025 COMPLETE CBC W/AUTO DIFF WBC: CPT

## 2019-01-10 PROCEDURE — 74011250636 HC RX REV CODE- 250/636: Performed by: EMERGENCY MEDICINE

## 2019-01-10 RX ORDER — ACETAMINOPHEN 650 MG/1
650 SUPPOSITORY RECTAL
Status: DISCONTINUED | OUTPATIENT
Start: 2019-01-10 | End: 2019-01-12 | Stop reason: HOSPADM

## 2019-01-10 RX ORDER — PANTOPRAZOLE SODIUM 40 MG/1
40 TABLET, DELAYED RELEASE ORAL DAILY
Status: DISCONTINUED | OUTPATIENT
Start: 2019-01-11 | End: 2019-01-12 | Stop reason: HOSPADM

## 2019-01-10 RX ORDER — MECLIZINE HCL 12.5 MG 12.5 MG/1
25 TABLET ORAL
Status: COMPLETED | OUTPATIENT
Start: 2019-01-10 | End: 2019-01-10

## 2019-01-10 RX ORDER — LABETALOL HYDROCHLORIDE 5 MG/ML
10 INJECTION, SOLUTION INTRAVENOUS
Status: COMPLETED | OUTPATIENT
Start: 2019-01-10 | End: 2019-01-11

## 2019-01-10 RX ORDER — ONDANSETRON 2 MG/ML
8 INJECTION INTRAMUSCULAR; INTRAVENOUS ONCE
Status: COMPLETED | OUTPATIENT
Start: 2019-01-10 | End: 2019-01-10

## 2019-01-10 RX ORDER — ONDANSETRON 2 MG/ML
4 INJECTION INTRAMUSCULAR; INTRAVENOUS
Status: DISCONTINUED | OUTPATIENT
Start: 2019-01-10 | End: 2019-01-12 | Stop reason: HOSPADM

## 2019-01-10 RX ORDER — ONDANSETRON 4 MG/1
8 TABLET, ORALLY DISINTEGRATING ORAL
Status: COMPLETED | OUTPATIENT
Start: 2019-01-10 | End: 2019-01-10

## 2019-01-10 RX ORDER — ACETAMINOPHEN 500 MG
1000 TABLET ORAL
Status: COMPLETED | OUTPATIENT
Start: 2019-01-10 | End: 2019-01-10

## 2019-01-10 RX ORDER — PAROXETINE HYDROCHLORIDE 20 MG/1
40 TABLET, FILM COATED ORAL DAILY
Status: DISCONTINUED | OUTPATIENT
Start: 2019-01-11 | End: 2019-01-12 | Stop reason: HOSPADM

## 2019-01-10 RX ORDER — TRAMADOL HYDROCHLORIDE 50 MG/1
100 TABLET ORAL
Status: COMPLETED | OUTPATIENT
Start: 2019-01-10 | End: 2019-01-10

## 2019-01-10 RX ORDER — HYDRALAZINE HYDROCHLORIDE 20 MG/ML
10 INJECTION INTRAMUSCULAR; INTRAVENOUS
Status: COMPLETED | OUTPATIENT
Start: 2019-01-10 | End: 2019-01-11

## 2019-01-10 RX ORDER — LISINOPRIL 20 MG/1
20 TABLET ORAL DAILY
Status: DISCONTINUED | OUTPATIENT
Start: 2019-01-11 | End: 2019-01-12 | Stop reason: HOSPADM

## 2019-01-10 RX ORDER — ACETAMINOPHEN 325 MG/1
650 TABLET ORAL
Status: DISCONTINUED | OUTPATIENT
Start: 2019-01-10 | End: 2019-01-12 | Stop reason: HOSPADM

## 2019-01-10 RX ADMIN — ONDANSETRON 8 MG: 2 INJECTION INTRAMUSCULAR; INTRAVENOUS at 22:21

## 2019-01-10 RX ADMIN — TRAMADOL HYDROCHLORIDE 100 MG: 50 TABLET, FILM COATED ORAL at 22:22

## 2019-01-10 RX ADMIN — ONDANSETRON 8 MG: 4 TABLET, ORALLY DISINTEGRATING ORAL at 19:51

## 2019-01-10 RX ADMIN — MECLIZINE 25 MG: 12.5 TABLET ORAL at 19:52

## 2019-01-10 RX ADMIN — ACETAMINOPHEN 1000 MG: 500 TABLET ORAL at 19:52

## 2019-01-11 ENCOUNTER — APPOINTMENT (OUTPATIENT)
Dept: CT IMAGING | Age: 59
DRG: 087 | End: 2019-01-11
Attending: HOSPITALIST
Payer: COMMERCIAL

## 2019-01-11 PROCEDURE — 97535 SELF CARE MNGMENT TRAINING: CPT

## 2019-01-11 PROCEDURE — 97530 THERAPEUTIC ACTIVITIES: CPT

## 2019-01-11 PROCEDURE — 74011250637 HC RX REV CODE- 250/637: Performed by: HOSPITALIST

## 2019-01-11 PROCEDURE — 97161 PT EVAL LOW COMPLEX 20 MIN: CPT

## 2019-01-11 PROCEDURE — 97165 OT EVAL LOW COMPLEX 30 MIN: CPT

## 2019-01-11 PROCEDURE — 74011250636 HC RX REV CODE- 250/636: Performed by: HOSPITALIST

## 2019-01-11 PROCEDURE — 74011250637 HC RX REV CODE- 250/637: Performed by: NURSE PRACTITIONER

## 2019-01-11 PROCEDURE — 65660000000 HC RM CCU STEPDOWN

## 2019-01-11 PROCEDURE — 70450 CT HEAD/BRAIN W/O DYE: CPT

## 2019-01-11 RX ORDER — HYDROCODONE BITARTRATE AND ACETAMINOPHEN 5; 325 MG/1; MG/1
1 TABLET ORAL
Status: DISCONTINUED | OUTPATIENT
Start: 2019-01-11 | End: 2019-01-12 | Stop reason: HOSPADM

## 2019-01-11 RX ORDER — HYDRALAZINE HYDROCHLORIDE 20 MG/ML
10 INJECTION INTRAMUSCULAR; INTRAVENOUS
Status: DISCONTINUED | OUTPATIENT
Start: 2019-01-11 | End: 2019-01-12 | Stop reason: HOSPADM

## 2019-01-11 RX ORDER — METOPROLOL TARTRATE 25 MG/1
12.5 TABLET, FILM COATED ORAL EVERY 12 HOURS
Status: DISCONTINUED | OUTPATIENT
Start: 2019-01-11 | End: 2019-01-12 | Stop reason: HOSPADM

## 2019-01-11 RX ORDER — BUTALBITAL, ACETAMINOPHEN AND CAFFEINE 50; 325; 40 MG/1; MG/1; MG/1
1 TABLET ORAL
Status: DISCONTINUED | OUTPATIENT
Start: 2019-01-11 | End: 2019-01-12 | Stop reason: HOSPADM

## 2019-01-11 RX ORDER — METOPROLOL TARTRATE 25 MG/1
12.5 TABLET, FILM COATED ORAL
Status: DISCONTINUED | OUTPATIENT
Start: 2019-01-11 | End: 2019-01-12 | Stop reason: HOSPADM

## 2019-01-11 RX ADMIN — HYDRALAZINE HYDROCHLORIDE 10 MG: 20 INJECTION INTRAMUSCULAR; INTRAVENOUS at 00:28

## 2019-01-11 RX ADMIN — LISINOPRIL 20 MG: 10 TABLET ORAL at 08:30

## 2019-01-11 RX ADMIN — METOPROLOL TARTRATE 12.5 MG: 25 TABLET ORAL at 13:43

## 2019-01-11 RX ADMIN — ACETAMINOPHEN 650 MG: 325 TABLET ORAL at 00:29

## 2019-01-11 RX ADMIN — ACETAMINOPHEN 650 MG: 325 TABLET ORAL at 08:35

## 2019-01-11 RX ADMIN — HYDRALAZINE HYDROCHLORIDE 10 MG: 20 INJECTION INTRAMUSCULAR; INTRAVENOUS at 01:00

## 2019-01-11 RX ADMIN — HYDRALAZINE HYDROCHLORIDE 10 MG: 20 INJECTION INTRAMUSCULAR; INTRAVENOUS at 00:45

## 2019-01-11 RX ADMIN — PANTOPRAZOLE SODIUM 40 MG: 40 TABLET, DELAYED RELEASE ORAL at 08:30

## 2019-01-11 RX ADMIN — METOPROLOL TARTRATE 12.5 MG: 25 TABLET ORAL at 22:02

## 2019-01-11 RX ADMIN — PAROXETINE HYDROCHLORIDE 40 MG: 20 TABLET, FILM COATED ORAL at 08:30

## 2019-01-11 RX ADMIN — ONDANSETRON 4 MG: 2 INJECTION INTRAMUSCULAR; INTRAVENOUS at 08:35

## 2019-01-11 RX ADMIN — HYDROCODONE BITARTRATE AND ACETAMINOPHEN 1 TABLET: 5; 325 TABLET ORAL at 13:43

## 2019-01-11 RX ADMIN — HYDROCODONE BITARTRATE AND ACETAMINOPHEN 1 TABLET: 5; 325 TABLET ORAL at 22:08

## 2019-01-11 RX ADMIN — BUTALBITAL, ACETAMINOPHEN AND CAFFEINE 1 TABLET: 50; 325; 40 TABLET ORAL at 10:25

## 2019-01-11 NOTE — PROGRESS NOTES
Problem: Patient Education: Go to Patient Education Activity Goal: Patient/Family Education Physical Therapy Goals Initiated 1/11/2019 1. Patient will move from supine to sit and sit to supine  in bed with independence within 7 day(s). 2.  Patient will transfer from bed to chair and chair to bed with independence using the least restrictive device within 7 day(s). 3.  Patient will perform sit to stand with independence within 7 day(s). 4.  Patient will ambulate with independence for 150 feet with the least restrictive device within 7 day(s). 5.  Patient will ascend/descend 12 stairs with 1 handrail(s) with independence within 7 day(s). 6.  Patient will improve Honeycutt Balance score by 7 points within 7 days. physical Therapy EVALUATION Patient: Sherrill Guerrero (89 y.o. female) Date: 1/11/2019 Primary Diagnosis: Subarachnoid hemorrhage following injury ([facility] [address] [S06.6X9A] Subarachnoid hemorrhage following injury ([facility] [address] [S06.6X9A] Precautions:  Fall ASSESSMENT : 
Based on the objective data described below, the patient presents with dizziness followed by nausea when transitioning positions and standing. She tolerated 2 minutes standing and able to take a few steps in place with CGA with difficulty due to onset of nausea, dizziness, unsteady. She was not able to progress to ambulation today and had to return to bed. Symptoms resolved once supine. Patient will benefit from skilled therapy intervention in the acute setting. Anticipate patient will be able to return home with caregiver assistance and outpatient therapy for concussion therapy. Recommend with nursing patient to complete as able in order to maintain strength, endurance and independence: OOB to chair 3x/day with CGA. Thank you for your assistance. Patient will benefit from skilled intervention to address the above impairments. Patients rehabilitation potential is considered to be Good Factors which may influence rehabilitation potential include:  
[]         None noted 
[]         Mental ability/status [x]         Medical condition 
[]         Home/family situation and support systems 
[]         Safety awareness 
[]         Pain tolerance/management 
[]         Other: PLAN : 
Recommendations and Planned Interventions: 
[x]           Bed Mobility Training             []    Neuromuscular Re-Education 
[x]           Transfer Training                   []    Orthotic/Prosthetic Training 
[x]           Gait Training                         []    Modalities [x]           Therapeutic Exercises           []    Edema Management/Control 
[x]           Therapeutic Activities            [x]    Patient and Family Training/Education 
[]           Other (comment): Frequency/Duration: Patient will be followed by physical therapy  5 times a week to address goals. Discharge Recommendations: Home with family and outpatient therapy (concussion therapy) Further Equipment Recommendations for Discharge: None needed SUBJECTIVE:  
Patient stated How long will this [concussion symptoms] last. OBJECTIVE DATA SUMMARY:  
HISTORY:   
Past Medical History:  
Diagnosis Date  Hypertension Past Surgical History:  
Procedure Laterality Date  HX OTHER SURGICAL Right 1981  
 eye Prior Level of Function/Home Situation: Independent with no device, no recent h/o falls, driving, recently retired Personal factors and/or comorbidities impacting plan of care: dizziness Home Situation Home Environment: Private residence # Steps to Enter: 4 Rails to Enter: No 
One/Two Story Residence: Two story Living Alone: No 
Support Systems: Spouse/Significant Other/Partner Current DME Used/Available at Home: Adaptive bathing aides, Adaptive dressing aides, Grab bars, Raised toilet seat, Shower chair, Walker, rolling Tub or Shower Type: Shower EXAMINATION/PRESENTATION/DECISION MAKING: Critical Behavior: Neurologic State: Alert Orientation Level: Oriented X4 Cognition: Appropriate decision making Safety/Judgement: Awareness of environment, Fall prevention Range Of Motion: 
AROM: Within functional limits Strength:   
Strength: Within functional limits Tone & Sensation:  
Tone: Normal 
Sensation: Intact Coordination: 
Coordination: Within functional limits Vision:  
Tracking: Able to track stimulus in all quadrants w/o difficulty Diplopia: No 
Acuity: Able to read employee name badge without difficulty Corrective Lenses: Reading glasses Functional Mobility: 
Bed Mobility: 
Rolling: Supervision Supine to Sit: Supervision Sit to Supine: Supervision Scooting: Supervision Transfers: 
Sit to Stand: Contact guard assistance Stand to Sit: Contact guard assistance Balance:  
Sitting: Intact Standing: Impaired; With support Standing - Static: Good Standing - Dynamic : FairAmbulation/Gait Training: Unable due nausea, dizzy. Able to take 5 steps in place with CGA before having to return to bed due to onset of symptoms. Functional Measure: 
Dominick Pounds Balance Test: 
 
Sitting to Standin Standing Unsupported: 0 Sitting with Back Unsupported: 3 Standing to Sittin Transfers: 1 Standing Unsupported with Eyes Closed: 0 Standing Unsupported with Feet Together: 0 Reach Forward with Outstretched Arm: 0  Object: 0 Turn to Look Over Shoulders: 0 Turn 360 Degrees: 0 Alternate Foot on Step/Stool: 0 Standing Unsupported One Foot in Front: 0 Stand on One Le Total: 5 
 
 
 
56=Maximum possible score;  
0-20=High fall risk 21-40=Moderate fall risk 41-56=Low fall risk Physical Therapy Evaluation Charge Determination History Examination Presentation Decision-Making LOW Complexity : Zero comorbidities / personal factors that will impact the outcome / POC LOW Complexity : 1-2 Standardized tests and measures addressing body structure, function, activity limitation and / or participation in recreation  LOW Complexity : Stable, uncomplicated  LOW Complexity : FOTO score of  Based on the above components, the patient evaluation is determined to be of the following complexity level: LOW Pain: 
Pain Scale 1: Numeric (0 - 10) Pain Intensity 1: 0 Pain Location 1: Head 
Pain Orientation 1: Posterior Pain Description 1: Aching Pain Intervention(s) 1: Medication (see MAR) Activity Tolerance:  
Poor tolerance to position changes and standing d/t nausea and dizzy. Please refer to the flowsheet for vital signs taken during this treatment. After treatment:  
[]         Patient left in no apparent distress sitting up in chair 
[x]         Patient left in no apparent distress in bed 
[x]         Call bell left within reach [x]         Nursing notified 
[x]         Caregiver present 
[]         Bed alarm activated COMMUNICATION/EDUCATION:  
The patients plan of care was discussed with: Occupational Therapist and Nursing. [x]         Fall prevention education was provided and the patient/caregiver indicated understanding. [x]         Patient/family have participated as able in goal setting and plan of care. [x]         Patient/family agree to work toward stated goals and plan of care. []         Patient understands intent and goals of therapy, but is neutral about his/her participation. []         Patient is unable to participate in goal setting and plan of care. Thank you for this referral. 
José Miguel Padilla, PT Time Calculation: 34 mins

## 2019-01-11 NOTE — PROGRESS NOTES
Admission Medication Reconciliation: 
 
Information obtained from:  patient, chart review, insurance claim information Comments/Recommendations: All medications/allergies have been reviewed and updated; last medication administration times reviewed and recorded. Changes made to Prior to Admission (PTA) Medication List: ? Medications Added:  
- None ? Medications Changed:  
- None ? Medications Removed:  
- None Significant PMH/Disease States:  
Past Medical History:  
Diagnosis Date  Hypertension Chief Complaint for this Admission: Chief Complaint Patient presents with  
 Head Injury Allergies:  Patient has no known allergies. Prior to Admission Medications:  
Prior to Admission Medications Prescriptions Last Dose Informant Patient Reported? Taking? PARoxetine (PAXIL) 40 mg tablet 1/10/2019 at Unknown time  No Yes Sig: TAKE 1 TABLET BY MOUTH DAILY  
lisinopril (PRINIVIL, ZESTRIL) 20 mg tablet 1/10/2019 at Unknown time  No Yes Sig: Take 1 Tab by mouth daily. omeprazole (PRILOSEC) 20 mg capsule 1/10/2019 at Unknown time  Yes Yes Sig: Take 20 mg by mouth daily. timolol (TIMOPTIC) 0.5 % ophthalmic solution 1/10/2019 at Unknown time  Yes Yes Sig: INSTILL 1 DROP IN RIGHT EYE IN THE MORNING Facility-Administered Medications: None Thank you for allowing pharmacy to participate in the coordination of this patient's care. If you have any other questions, please contact the medication reconciliation pharmacist at x [phone]. Yun Guthrie PharmSanam D., BCPS

## 2019-01-11 NOTE — ED NOTES
Pt is a transfer from [facility] c/o head injury as a result of a fall  That happened at 1900 while ice skating . Neg loc neg sob neg chest pain . Pt is a&o x4 . VSS

## 2019-01-11 NOTE — ED PROVIDER NOTES
The history is provided by the patient. Head Injury The incident occurred less than 1 hour ago. She came to the ER via EMS. The injury mechanism was a fall. The volume of blood lost was minimal. The pain is moderate. Associated symptoms include weakness (after the blow patient felt like she was unable to move her arms and legs for a few seconds. First the strength in her legs returned, then she regained all strength). Pertinent negatives include no numbness, no vomiting, no disorientation and no memory loss. She was found conscious by EMS personnel. Treatment on the scene included a c-collar. She has tried cold compress for the symptoms. There was no loss of consciousness. She has been behaving normally. Past Medical History:  
Diagnosis Date  Hypertension Past Surgical History:  
Procedure Laterality Date  HX OTHER SURGICAL Right 1981  
 eye Family History:  
Problem Relation Age of Onset  Macular Degen Mother  Hypertension Father Social History Socioeconomic History  Marital status:  Spouse name: Not on file  Number of children: Not on file  Years of education: Not on file  Highest education level: Not on file Social Needs  Financial resource strain: Not on file  Food insecurity - worry: Not on file  Food insecurity - inability: Not on file  Transportation needs - medical: Not on file  Transportation needs - non-medical: Not on file Occupational History  Not on file Tobacco Use  Smoking status: Never Smoker  Smokeless tobacco: Never Used Substance and Sexual Activity  Alcohol use: Yes Comment: 2 drinks per week  Drug use: No  
 Sexual activity: Yes  
  Partners: Female Birth control/protection: None Other Topics Concern  Not on file Social History Narrative  Not on file ALLERGIES: Patient has no known allergies. Review of Systems Constitutional: Negative for chills and fever. Respiratory: Negative for shortness of breath. Cardiovascular: Negative for chest pain. Gastrointestinal: Negative for abdominal pain, constipation, diarrhea and vomiting. Neurological: Positive for dizziness, weakness (after the blow patient felt like she was unable to move her arms and legs for a few seconds. First the strength in her legs returned, then she regained all strength) and light-headedness. Negative for speech difficulty and numbness. Psychiatric/Behavioral: Negative for confusion and memory loss. All other systems reviewed and are negative. There were no vitals filed for this visit. Physical Exam  
Constitutional: She is oriented to person, place, and time. She appears well-developed and well-nourished. HENT:  
Head: Normocephalic. Abrasion to the occiput with associated scalp hematoma Eyes: Pupils are equal, round, and reactive to light. Neck: Normal range of motion. Neck supple. No spinous process tenderness and no muscular tenderness present. Cardiovascular: Normal rate and regular rhythm. Pulmonary/Chest: Effort normal and breath sounds normal.  
Abdominal: Soft. She exhibits no distension. There is no tenderness. Neurological: She is alert and oriented to person, place, and time. No cranial nerve deficit or sensory deficit. She exhibits normal muscle tone. Skin: Skin is warm and dry. Capillary refill takes less than 2 seconds. Psychiatric: She has a normal mood and affect. Her behavior is normal.  
Nursing note and vitals reviewed. MDM Number of Diagnoses or Management Options Diagnosis management comments: Pt status post fall with head injury and CT that shows SAH. Alert and oriented with dizziness and otherwise unremarkable neurological exam.  Satisfies NEXUS criteria. Admit to hospital. 
 
  
 
Procedures 8:30 PM 
Consult with Dr. Americo Ramirez, neurosurgery - he is aware of the patient, no current recommendations Patient is being admitted to the hospital.  The results of their tests and reasons for their admission have been discussed with them and/or available family. They convey agreement and understanding for the need to be admitted and for their admission diagnosis. Consultation will be made now with the inpatient physician for hospitalization. 9:03 PM 
Pt accepted by Dr. Oleary. 
 
10:15 PM 
Patient going to [facility] ED for ED hold - discussed presentation and findings with Dr. Carline Ghotra.

## 2019-01-11 NOTE — PROGRESS NOTES
Patient's BP elevated over set parameters ( 152/78/hr 90) despite having received lisinopril this am.  Dr. Slime Wills aware and will put orders in for management of blood pressure. Will continue to monitor and make receiving floor aware.

## 2019-01-11 NOTE — PROGRESS NOTES
TRANSFER - OUT REPORT: 
 
Verbal report given to STAN Whitney(name) on Paula Alfonso  being transferred to [facility](unit) for routine progression of care Report consisted of patients Situation, Background, Assessment and  
Recommendations(SBAR). Information from the following report(s) SBAR was reviewed with the receiving nurse. Lines:  
Peripheral IV 01/10/19 Left Hand (Active) Site Assessment Clean, dry, & intact 1/10/2019  9:26 PM  
Phlebitis Assessment 0 1/10/2019  9:26 PM  
Infiltration Assessment 0 1/10/2019  9:26 PM  
Dressing Status Clean, dry, & intact 1/10/2019  9:26 PM  
Dressing Type Transparent 1/10/2019  9:26 PM  
  
 
Opportunity for questions and clarification was provided. Patient transported with: 
 [employer]

## 2019-01-11 NOTE — PROGRESS NOTES
Problem: Falls - Risk of 
Goal: *Absence of Falls Document Anya Villavicencio Fall Risk and appropriate interventions in the flowsheet. Outcome: Progressing Towards Goal 
Fall Risk Interventions: 
Mobility Interventions: OT consult for ADLs, Patient to call before getting OOB, PT Consult for mobility concerns, PT Consult for assist device competence Medication Interventions: Evaluate medications/consider consulting pharmacy, Patient to call before getting OOB, Teach patient to arise slowly History of Falls Interventions: Door open when patient unattended, Investigate reason for fall, Room close to nurse's station

## 2019-01-11 NOTE — PROGRESS NOTES
Problem: Self Care Deficits Care Plan (Adult) Goal: *Acute Goals and Plan of Care (Insert Text) Occupational Therapy Goals Initiated 1/11/2019 1. Patient will perform grooming standing at sink for 5 minutes with no LOB or c/o dizziness with independence within 7 day(s). 2.  Patient will perform upper body ADLs with independence within 7 day(s). 3.  Patient will perform lower body ADLs with independence within 7 day(s). 4.  Patient will perform toilet transfers with independence within 7 day(s). 5.  Patient will perform all aspects of toileting with independence within 7 day(s). 6.  Patient will participate in upper extremity therapeutic exercise/activities with independence for 5 minutes within 7 day(s). 7.  Patient will utilize energy conservation techniques during functional activities with verbal cues within 7 day(s). Occupational Therapy EVALUATION Patient: Reta Lyons (54 y.o. female) Date: 1/11/2019 Primary Diagnosis: Subarachnoid hemorrhage following injury ([facility] [address] [S06.6X9A] Subarachnoid hemorrhage following injury ([facility] [address] [S06.6X9A] Precautions:  Fall, SBP<160 
 
ASSESSMENT : 
Based on the objective data described below, the patient presents with overall supervision for bed mobility, CGA for functional mobility, infer IND-CGA for upper body ADLs and and CGA for lower body ADLs s/p admission for small SAH following a fall on ice and hitting back of head. Patient recently retired and is IND at baseline with ADLs, IADLs and functional mobility. Today, patient ADLs limited by dizziness and nausea with movement (resolved when supine), generalized weakness, decreased functional activity tolerance and mildly impaired balance. Anticipate patient will make good progress with acute rehab and medical management, may benefit from [address] [place] [address] vs. OP concussion clinic with increased assist from spouse once medically stable.   
 
Recommend with nursing patient to complete as able in order to maintain strength, endurance and independence: ADLs with supervision/setup, OOB to chair 3x/day and mobilizing to the BSC/bathroom for toileting with 1 assist. Thank you for your assistance. Patient will benefit from skilled intervention to address the above impairments. Patients rehabilitation potential is considered to be Good Factors which may influence rehabilitation potential include:  
[]             None noted []             Mental ability/status []             Medical condition []             Home/family situation and support systems []             Safety awareness []             Pain tolerance/management 
[]             Other: PLAN : 
Recommendations and Planned Interventions: 
[x]               Self Care Training                  [x]        Therapeutic Activities [x]               Functional Mobility Training    []        Cognitive Retraining 
[x]               Therapeutic Exercises           [x]        Endurance Activities [x]               Balance Training                   []        Neuromuscular Re-Education []               Visual/Perceptual Training     [x]   Home Safety Training 
[x]               Patient Education                 [x]        Family Training/Education []               Other (comment): Frequency/Duration: Patient will be followed by occupational therapy 5 times a week to address goals. Discharge Recommendations: Home Health vs. OP concussion clinic Further Equipment Recommendations for Discharge: none - patient reports having all needed DME 2* wife having recent hip replacment SUBJECTIVE:  
Patient stated I was learning how to play ice hockey now that I'm retired.  OBJECTIVE DATA SUMMARY:  
HISTORY:  
Past Medical History:  
Diagnosis Date  Hypertension Past Surgical History:  
Procedure Laterality Date  HX OTHER SURGICAL Right 1981  
 eye Prior Level of Function/Environment/Context: Patient lives in 2 level home with wife and was IND, active and driving PTA. Patient uses no DME at baseline. Home Situation Home Environment: Private residence # Steps to Enter: 5 Rails to Enter: No 
One/Two Story Residence: Two story # of Interior Steps: 25 Height of Each Step (in): 4 inches Interior Rails: Both Lift Chair Available: No 
Living Alone: No 
Support Systems: Spouse/Significant Other/Partner Patient Expects to be Discharged to[de-identified] Private residence Current DME Used/Available at Home: None Tub or Shower Type: Shower Hand dominance: RightEXAMINATION OF PERFORMANCE DEFICITS: 
Cognitive/Behavioral Status: 
Neurologic State: Alert Orientation Level: Oriented X4 Cognition: Appropriate decision making Perception: Appears intact Perseveration: No perseveration noted Safety/Judgement: Awareness of environment; Fall preventionSkin: Appears grossly intact Edema: none noted in BUEs Hearing: Auditory Auditory Impairment: NoneVision/Perceptual:   
Tracking: Able to track stimulus in all quadrants w/o difficulty Diplopia: No   
Acuity: Able to read employee name badge without difficulty Corrective Lenses: Reading glasses Range of Motion: In Siddharth Moore AROM: Within functional limits Strength: In Siddharth Moore Strength: Within functional limits Coordination: 
Coordination: Within functional limits Fine Motor Skills-Upper: Left Intact; Right Intact Gross Motor Skills-Upper: Left Intact; Right Intact Tone & Sensation: In Siddharth Moore Tone: Normal 
Sensation: Intact Balance: 
Sitting: Intact Standing: Impaired; With support Standing - Static: Good Standing - Dynamic : FairFunctional Mobility and Transfers for ADLs:Bed Mobility: 
Rolling: Supervision Supine to Sit: Supervision Sit to Supine: Supervision Scooting: Supervision Transfers: 
Sit to Stand: Contact guard assistance Stand to Sit: Contact guard assistance Bed to Chair: Contact guard assistance(Infer) Toilet Transfer : Contact guard assistance(Infer) ADL Assessment: 
Feeding: Independent Oral Facial Hygiene/Grooming: Independent;Contact guard assistance(IND sitting, CGA standing 2* dizziness) Bathing: Contact guard assistance(Infer supervision sitting, CGA standing 2* dizziness) Upper Body Dressing: Supervision(Infer per obs of func mobility, func reach, FM coord) Lower Body Dressing: Supervision;Contact guard assistance(Infer supervision sitting, CGA standing 2* dizziness) Toileting: Contact guard assistance ADL Intervention and task modifications: 
Lower Body Dressing Assistance Socks: Stand-by assistance Leg Crossed Method Used: Yes Position Performed: Seated edge of bed Cues: Don;Doff Cognitive Retraining Safety/Judgement: Awareness of environment; Fall prevention Functional Measure: 
Barthel Index: 
 
Bathin Bladder: 10 Bowels: 10 
Groomin Dressin Feeding: 10 Mobility: 5 Stairs: 0 Toilet Use: 5 Transfer (Bed to Chair and Back): 5 Total: 55 The Barthel ADL Index: Guidelines 1. The index should be used as a record of what a patient does, not as a record of what a patient could do. 2. The main aim is to establish degree of independence from any help, physical or verbal, however minor and for whatever reason. 3. The need for supervision renders the patient not independent. 4. A patient's performance should be established using the best available evidence. Asking the patient, friends/relatives and nurses are the usual sources, but direct observation and common sense are also important. However direct testing is not needed. 5. Usually the patient's performance over the preceding 24-48 hours is important, but occasionally longer periods will be relevant. 6. Middle categories imply that the patient supplies over 50 per cent of the effort. 7. Use of aids to be independent is allowed. Reji Oneal, Barthel, D.W. (4835). Functional evaluation: the Barthel Index. [facility] (14)2. DELANEY Saucedo, Florencia Rodriguez., Charis Apley., [place], [address] (1999). Measuring the change indisability after inpatient rehabilitation; comparison of the responsiveness of the Barthel Index and Functional Mitchell Measure. Journal of Neurology, Neurosurgery, and Psychiatry, 66(4), 813-582. Leland Lennox, N.J.A, JORGE Garcia, & Beka Villar M.A. (2004.) Assessment of post-stroke quality of life in cost-effectiveness studies: The usefulness of the Barthel Index and the EuroQoL-5D. [facility], 13, 806-58 Occupational Therapy Evaluation Charge Determination History Examination Decision-Making LOW Complexity : Brief history review  LOW Complexity : 1-3 performance deficits relating to physical, cognitive , or psychosocial skils that result in activity limitations and / or participation restrictions  MEDIUM Complexity : Patient may present with comorbidities that affect occupational performnce. Miniml to moderate modification of tasks or assistance (eg, physical or verbal ) with assesment(s) is necessary to enable patient to complete evaluation Based on the above components, the patient evaluation is determined to be of the following complexity level: LOW Pain: 
Pain Scale 1: Numeric (0 - 10) Pain Intensity 1: 6 Pain Location 1: Head 
Pain Orientation 1: Posterior Pain Description 1: Aching Pain Intervention(s) 1: (ICE) Activity Tolerance:  
Fair, VSS (mild orthostatic hypotension) Please refer to the flowsheet for vital signs taken during this treatment. After treatment:  
[] Patient left in no apparent distress sitting up in chair 
[x] Patient left in no apparent distress in bed 
[x] Call bell left within reach [x] Nursing notified 
[x] Caregiver present 
[] Bed alarm activated COMMUNICATION/EDUCATION:  
The patients plan of care was discussed with: Physical Therapist and Registered Nurse. [x] Home safety education was provided and the patient/caregiver indicated understanding. [x] Patient/family have participated as able in goal setting and plan of care. [] Patient/family agree to work toward stated goals and plan of care. [] Patient understands intent and goals of therapy, but is neutral about his/her participation. [] Patient is unable to participate in goal setting and plan of care. This patients plan of care is appropriate for delegation to [facility]. Thank you for this referral. 
Guillermina Luz OT Time Calculation: 33 mins

## 2019-01-11 NOTE — ED NOTES
Bedside shift change report given to Earl Rangel  (oncoming nurse) by Dayan Watkins  (offgoing nurse). Report included the following information SBAR, ED Summary and Recent Results.

## 2019-01-11 NOTE — PROGRESS NOTES
Neurosurgery Progress Note Judy Schwartz 
[phone] Admit Date: 1/10/2019 LOS: 1 day Daily Progress Note: 2019 Subjective: The patient apparently fell while ice skating and hit the back of her head, causing her to fell like she couldn't move her arms or legs for a second then all strength came back. She denies LOC. She received an occipital scalp laceration. EMS transported the patient to the [place] ER. The patient was found to have a small amount of subarachnoid hemorrhage on her head CT. She transferred to [facility] ER. This morning, the patient is complaining of a headache, dizziness, inability to sit up due to the dizziness. She reports she has a history of an eye injury to her right eye in college where her iris was cut out. She states her vision is like looking through wax paper in the right eye. Denies numbness, tingling, chest pain, leg pain, vomiting, difficulty swallowing, and dyspnea. Objective:  
 
Vital signs Temp (24hrs), Av.5 °F (36.9 °C), Min:98.2 °F (36.8 °C), Max:98.7 °F (37.1 °C) 
  0701 -  1900 In: -  
Out: 275 [Urine:275]  No intake/output data recorded. Visit Vitals /86 Pulse (!) 104 Temp 98.7 °F (37.1 °C) Resp 16 Ht 5' 7\" (1.702 m) Wt 93 kg (205 lb) SpO2 95% BMI 32.11 kg/m² O2 Device: Room air Pain control Pain Assessment Pain Scale 1: Numeric (0 - 10) Pain Intensity 1: 7 Pain Location 1: Head 
Pain Orientation 1: Posterior Pain Description 1: Aching Pain Intervention(s) 1: Medication (see MAR) PT/OT Gait Physical Exam: 
Gen:NAD. Neuro: A&Ox3. Follows commands. Speech clear. Affect normal. 
PERRL. EOMI. Face symmetric. Palate symmetric. Tongue midline. RODRIGUES. Strength 5/5 in UE and LE BL. Negative drift. Gait deferred. Skin: Occipital abrasion and hematoma CT head without contrast on 01/10/19 shows trace subarachnoid hemorrhage between the anteroinferior frontal lobes. No mass effect. Occiput laceration with small to moderate scalp hematoma. CT head without contrast on 01/11/19 shows redistribution of small amount of subarachnoid hemorrhage from the anterior inferior frontal region to the right sylvian fissure. No new intracranial abnormality. 
  
 
24 hour results: 
 
Recent Results (from the past 24 hour(s)) CBC WITH AUTOMATED DIFF Collection Time: 01/10/19  8:40 PM  
Result Value Ref Range WBC 8.6 3.6 - 11.0 K/uL  
 RBC 4.45 3.80 - 5.20 M/uL  
 HGB 13.6 11.5 - 16.0 g/dL HCT 42.2 35.0 - 47.0 % MCV 94.8 80.0 - 99.0 FL  
 MCH 30.6 26.0 - 34.0 PG  
 MCHC 32.2 30.0 - 36.5 g/dL  
 RDW 13.4 11.5 - 14.5 % PLATELET 033 368 - 119 K/uL MPV 10.8 8.9 - 12.9 FL  
 NRBC 0.0 0  WBC ABSOLUTE NRBC 0.00 0.00 - 0.01 K/uL NEUTROPHILS 71 32 - 75 % LYMPHOCYTES 19 12 - 49 % MONOCYTES 8 5 - 13 % EOSINOPHILS 2 0 - 7 % BASOPHILS 0 0 - 1 % IMMATURE GRANULOCYTES 0 0.0 - 0.5 % ABS. NEUTROPHILS 6.1 1.8 - 8.0 K/UL  
 ABS. LYMPHOCYTES 1.6 0.8 - 3.5 K/UL  
 ABS. MONOCYTES 0.7 0.0 - 1.0 K/UL  
 ABS. EOSINOPHILS 0.2 0.0 - 0.4 K/UL  
 ABS. BASOPHILS 0.0 0.0 - 0.1 K/UL  
 ABS. IMM. GRANS. 0.0 0.00 - 0.04 K/UL  
 DF AUTOMATED METABOLIC PANEL, BASIC Collection Time: 01/10/19  8:40 PM  
Result Value Ref Range Sodium 141 136 - 145 mmol/L Potassium 4.0 3.5 - 5.1 mmol/L Chloride 107 97 - 108 mmol/L  
 CO2 25 21 - 32 mmol/L Anion gap 9 5 - 15 mmol/L Glucose 121 (H) 65 - 100 mg/dL BUN 22 (H) 6 - 20 MG/DL Creatinine 1.07 (H) 0.55 - 1.02 MG/DL  
 BUN/Creatinine ratio 21 (H) 12 - 20 GFR est AA >60 >60 ml/min/1.73m2 GFR est non-AA 52 (L) >60 ml/min/1.73m2 Calcium 9.3 8.5 - 10.1 MG/DL PROTHROMBIN TIME + INR Collection Time: 01/10/19  8:40 PM  
Result Value Ref Range INR 1.0 0.9 - 1.1 Prothrombin time 9.6 9.0 - 11.1 sec Assessment:  
 
Active Problems: Subarachnoid hemorrhage following injury ([facility] [address] (1/10/2019) Plan: 1. Traumatic SAH 
 - CT stable 
 - ok for patient to go to [facility] 
 - No surgical intervention required - Avoid NSAIDS/ASA for 2 weeks 
 - Ok to eat - Mobilize - No further CT scans needed unless patient has an acute neurological decline 2. Concussive syndrome 
 - due to #1 
 - Educated pt on \"brain rest\" - Fioricet PRN 3. HTN 
 - Cont lisinopril from home  
 - SBP<160 
4. Anxiety 
 - Cont paxil from home 5. GERD 
 - Cont protonix from home Activity: up with assist 
DVT ppx: SCDs Dispo: home Transfer to [facility]. D/C to home when able to eat and ambulate. Plan d/w Dr. Stacie Pizano, Dr. Dionicio Teresa. No surgical intervention needed. Recommend referral to [facility] [address] if concussive symptoms persist. Will sign off. Call if questions.  
 
 
Michelle Rojo NP

## 2019-01-11 NOTE — PROGRESS NOTES
Occupational Therapy  -  
[id number] Orders acknowledged and chart reviewed in prep for OT evaluation. Patient moved to a new room, RN not available to clear patient for mobility at this time. Will f/u later in AM/PM for OT evaluation. Db Elliott, MS, OTR/L

## 2019-01-11 NOTE — ED TRIAGE NOTES
Pt was ice skating and fell, hitting head on ice, no loc but states she was unable to move for a few seconds. Came in with c-collar. Hx of htn, bp high with EMS

## 2019-01-11 NOTE — H&P
HISTORY AND PHYSICAL 
 
 
PCP: Siobhan Gonsalez MD 
History source: the patient CC: fall HPI: 60 y.o lady with HTN who was ice skating and fell and hit the back of her head. She felt vertiginous afterwards and was taken to the ED. Work-up there revealed tiny left frontal SAH and she was referred for admission. She currently complains of vertigo with minimal movement. No nausea or vomiting, no dyspnea. Back of head hurts where she fell. PMH/PSH: 
Past Medical History:  
Diagnosis Date  Hypertension Past Surgical History:  
Procedure Laterality Date  HX OTHER SURGICAL Right 1981  
 eye Home meds:  
Prior to Admission medications Medication Sig Start Date End Date Taking? Authorizing Provider PARoxetine (PAXIL) 40 mg tablet TAKE 1 TABLET BY MOUTH DAILY 7/11/18  Yes Siobhan Gonsalez MD  
lisinopril (PRINIVIL, ZESTRIL) 20 mg tablet Take 1 Tab by mouth daily. 6/5/18  Yes Siobhan Gonsalez MD  
timolol (TIMOPTIC) 0.5 % ophthalmic solution INSTILL 1 DROP IN RIGHT EYE IN THE MORNING 7/28/17  Yes Provider, Historical  
omeprazole (PRILOSEC) 20 mg capsule Take 20 mg by mouth daily. Yes Provider, Historical  
 
 
Allergies: 
No Known Allergies FH: 
Family History Problem Relation Age of Onset  Macular Degen Mother  Hypertension Father SH: Social History Tobacco Use  Smoking status: Never Smoker  Smokeless tobacco: Never Used Substance Use Topics  Alcohol use: Yes Comment: 2 drinks per week ROS: A comprehensive review of systems was negative except for that written in the HPI. PHYSICAL EXAM: 
Visit Vitals /78 Pulse 83 Temp 98.2 °F (36.8 °C) Resp 13 Ht 5' 7\" (1.702 m) Wt 93 kg (205 lb) SpO2 95% BMI 32.11 kg/m² Gen: NAD, non-toxic HEENT: anicteric sclerae, normal conjunctiva, oropharynx clear, MM moist, dry blood on occiput Neck: supple, trachea midline, no adenopathy Heart: RRR, no MRG, no JVD, no peripheral edema Lungs: CTA b/l, non-labored respirations Abd: soft, NT, ND, BS+, no organomegaly Extr: warm Skin: dry, no rash Neuro: CN II-XII grossly intact, R anisocoria (hx of surgery), normal speech, moves all extremities Psych: normal mood, appropriate affect Labs/Imaging: 
Recent Results (from the past 24 hour(s)) CBC WITH AUTOMATED DIFF Collection Time: 01/10/19  8:40 PM  
Result Value Ref Range WBC 8.6 3.6 - 11.0 K/uL  
 RBC 4.45 3.80 - 5.20 M/uL  
 HGB 13.6 11.5 - 16.0 g/dL HCT 42.2 35.0 - 47.0 % MCV 94.8 80.0 - 99.0 FL  
 MCH 30.6 26.0 - 34.0 PG  
 MCHC 32.2 30.0 - 36.5 g/dL  
 RDW 13.4 11.5 - 14.5 % PLATELET 998 180 - 336 K/uL MPV 10.8 8.9 - 12.9 FL  
 NRBC 0.0 0  WBC ABSOLUTE NRBC 0.00 0.00 - 0.01 K/uL NEUTROPHILS 71 32 - 75 % LYMPHOCYTES 19 12 - 49 % MONOCYTES 8 5 - 13 % EOSINOPHILS 2 0 - 7 % BASOPHILS 0 0 - 1 % IMMATURE GRANULOCYTES 0 0.0 - 0.5 % ABS. NEUTROPHILS 6.1 1.8 - 8.0 K/UL  
 ABS. LYMPHOCYTES 1.6 0.8 - 3.5 K/UL  
 ABS. MONOCYTES 0.7 0.0 - 1.0 K/UL  
 ABS. EOSINOPHILS 0.2 0.0 - 0.4 K/UL  
 ABS. BASOPHILS 0.0 0.0 - 0.1 K/UL  
 ABS. IMM. GRANS. 0.0 0.00 - 0.04 K/UL  
 DF AUTOMATED METABOLIC PANEL, BASIC Collection Time: 01/10/19  8:40 PM  
Result Value Ref Range Sodium 141 136 - 145 mmol/L Potassium 4.0 3.5 - 5.1 mmol/L Chloride 107 97 - 108 mmol/L  
 CO2 25 21 - 32 mmol/L Anion gap 9 5 - 15 mmol/L Glucose 121 (H) 65 - 100 mg/dL BUN 22 (H) 6 - 20 MG/DL Creatinine 1.07 (H) 0.55 - 1.02 MG/DL  
 BUN/Creatinine ratio 21 (H) 12 - 20 GFR est AA >60 >60 ml/min/1.73m2 GFR est non-AA 52 (L) >60 ml/min/1.73m2 Calcium 9.3 8.5 - 10.1 MG/DL PROTHROMBIN TIME + INR Collection Time: 01/10/19  8:40 PM  
Result Value Ref Range INR 1.0 0.9 - 1.1 Prothrombin time 9.6 9.0 - 11.1 sec Recent Labs  
  01/10/19 
2040 WBC 8.6 HGB 13.6 HCT 42.2  Recent Labs  
  01/10/19 
2040   
K 4.0  CO2 25 BUN 22* CREA 1.07* * CA 9.3 No results for input(s): SGOT, GPT, ALT, AP, TBIL, TBILI, TP, ALB, GLOB, GGT, AML, LPSE in the last 72 hours. No lab exists for component: AMYP, HLPSE No results for input(s): CPK, CKNDX, TROIQ in the last 72 hours. No lab exists for component: CPKMB Recent Labs  
  01/10/19 
2040 INR 1.0 PTP 9.6 No results for input(s): PH, PCO2, PO2 in the last 72 hours. Ct Head Wo Cont Result Date: 1/10/2019 IMPRESSION: 1. Trace subarachnoid hemorrhage between the anteroinferior frontal lobes. No mass effect. 2. Occiput laceration with small to moderate scalp hematoma. The findings were called to Dr. Mary Quiroz on 1/10/2019 8:17 PM by Dr. Alfred Rene. [facility] Assessment & Plan:  
 
SAH: 
-repeat HCT in AM 
-NSG consulted 
-BP control HTN: 
-continue home lisinopril 
-PRN IV labetalol DVT ppx: SCDs Code status: full Disposition: home when ready Signed By: Liliana Corley MD   
 January 11, 2019

## 2019-01-11 NOTE — ED NOTES
Spoke with Rosaura Grayson from [facility] about  Repeat CT that was ordered at their location, order was cancelled and reordered here for [id number] 4 am

## 2019-01-11 NOTE — PROGRESS NOTES
Orders received, chart reviewed and patient evaluated by occupational therapy. Recommend patient to discharge home with [address] [place] [address] vs. OP-PT/concussion clinic pending progression with skilled acute occupational therapy.  
  
Recommend with nursing patient to complete as able in order to maintain strength, endurance and independence: OOB to chair 3x/day, ADLs with supervision/setup and mobilizing to the [facility] for toileting with 1 assist. Thank you for your assistance.  
  
Full evaluation to follow.

## 2019-01-11 NOTE — ED NOTES
[facility] here to p/u. TRANSFER - OUT REPORT: 
 
Verbal report given to Shikha(name) on Zoey Sifuentes  being transferred to ER(unit) for routine progression of care Report consisted of patients Situation, Background, Assessment and  
Recommendations(SBAR). Information from the following report(s) SBAR, ED Summary and MAR was reviewed with the receiving nurse. Lines:  
Peripheral IV 01/10/19 Left Hand (Active) Site Assessment Clean, dry, & intact 1/10/2019  9:26 PM  
Phlebitis Assessment 0 1/10/2019  9:26 PM  
Infiltration Assessment 0 1/10/2019  9:26 PM  
Dressing Status Clean, dry, & intact 1/10/2019  9:26 PM  
Dressing Type Transparent 1/10/2019  9:26 PM  
  
 
Opportunity for questions and clarification was provided. Patient transported with: 
 Monitor

## 2019-01-12 VITALS
WEIGHT: 204.8 LBS | TEMPERATURE: 99.1 F | OXYGEN SATURATION: 100 % | RESPIRATION RATE: 16 BRPM | HEIGHT: 67 IN | DIASTOLIC BLOOD PRESSURE: 65 MMHG | BODY MASS INDEX: 32.15 KG/M2 | SYSTOLIC BLOOD PRESSURE: 119 MMHG | HEART RATE: 80 BPM

## 2019-01-12 PROCEDURE — 97116 GAIT TRAINING THERAPY: CPT

## 2019-01-12 PROCEDURE — 74011250637 HC RX REV CODE- 250/637: Performed by: NURSE PRACTITIONER

## 2019-01-12 PROCEDURE — 74011250637 HC RX REV CODE- 250/637: Performed by: HOSPITALIST

## 2019-01-12 RX ORDER — HYDROCODONE BITARTRATE AND ACETAMINOPHEN 5; 325 MG/1; MG/1
1 TABLET ORAL
Qty: 5 TAB | Refills: 0 | Status: SHIPPED | OUTPATIENT
Start: 2019-01-12 | End: 2019-05-02 | Stop reason: ALTCHOICE

## 2019-01-12 RX ORDER — BUTALBITAL, ACETAMINOPHEN AND CAFFEINE 50; 325; 40 MG/1; MG/1; MG/1
1 TABLET ORAL
Qty: 15 TAB | Refills: 0 | Status: SHIPPED | OUTPATIENT
Start: 2019-01-12 | End: 2020-02-12 | Stop reason: ALTCHOICE

## 2019-01-12 RX ADMIN — BUTALBITAL, ACETAMINOPHEN AND CAFFEINE 1 TABLET: 50; 325; 40 TABLET ORAL at 09:38

## 2019-01-12 RX ADMIN — PANTOPRAZOLE SODIUM 40 MG: 40 TABLET, DELAYED RELEASE ORAL at 09:38

## 2019-01-12 RX ADMIN — HYDROCODONE BITARTRATE AND ACETAMINOPHEN 1 TABLET: 5; 325 TABLET ORAL at 06:21

## 2019-01-12 RX ADMIN — METOPROLOL TARTRATE 12.5 MG: 25 TABLET ORAL at 09:39

## 2019-01-12 RX ADMIN — PAROXETINE HYDROCHLORIDE 40 MG: 20 TABLET, FILM COATED ORAL at 09:38

## 2019-01-12 RX ADMIN — LISINOPRIL 20 MG: 10 TABLET ORAL at 09:38

## 2019-01-12 NOTE — DISCHARGE INSTRUCTIONS
Discharge Instructions       PATIENT ID: Mary Garibay  MRN: [id number]   YOB: 1960    DATE OF ADMISSION: 1/10/2019  7:34 PM    DATE OF DISCHARGE: 1/12/2019    PRIMARY CARE PROVIDER: Laz Spence MD     ATTENDING PHYSICIAN: Eva Garcia MD  DISCHARGING PROVIDER: Reyes Sims MD    To contact this individual call [phone] and ask the  to page. If unavailable ask to be transferred the Adult Hospitalist Department. DISCHARGE DIAGNOSES   Subarachnoid hemorrhage  Headache    CONSULTATIONS: IP CONSULT TO NEUROSURGERY  IP CONSULT TO HOSPITALIST    PROCEDURES/SURGERIES: * No surgery found *    PENDING TEST RESULTS:   At the time of discharge the following test results are still pending: none    FOLLOW UP APPOINTMENTS:   Follow-up Information     Follow up With Specialties Details Why [address]aj Montiel MD [facility], Sports Medicine In 1 week  [address] [facility]  [phone]             ADDITIONAL CARE RECOMMENDATIONS:   Follow up with PMD  No NSAIDS for 2 weeks    DIET: Cardiac Diet    ACTIVITY: Activity as tolerated      DISCHARGE MEDICATIONS:   See Medication Reconciliation Form    · It is important that you take the medication exactly as they are prescribed. · Keep your medication in the bottles provided by the pharmacist and keep a list of the medication names, dosages, and times to be taken in your wallet. · Do not take other medications without consulting your doctor. NOTIFY YOUR PHYSICIAN FOR ANY OF THE FOLLOWING:   Fever over 101 degrees for 24 hours. Chest pain, shortness of breath, fever, chills, nausea, vomiting, diarrhea, change in mentation, falling, weakness, bleeding. Severe pain or pain not relieved by medications. Or, any other signs or symptoms that you may have questions about.       DISPOSITION:  x  Home With:   OT  PT  HH  RN       SNF/Inpatient Rehab/LTAC    Independent/assisted living    Hospice    Other:     CDMP Checked:   Yes x     PROBLEM LIST Updated:  Yes x       Signed:   Severiano Dole, MD  1/12/2019  9:25 AM

## 2019-01-12 NOTE — PROGRESS NOTES
Hospitalist Progress Note Mustapha Ames MD 
Answering service: [phone] from in house phone Cell: [phone] Date of Service:  2019 NAME:  Rtea Lyons :  1960 MRN:  [id number] Admission Summary:  
60 y.o lady with HTN who was ice skating and fell and hit the back of her head. She felt vertiginous afterwards and was taken to the ED. Work-up there revealed tiny left frontal SAH and she was referred for admission. She currently complains of vertigo with minimal movement. No nausea or vomiting, no dyspnea. Back of head hurts where she fell. Interval history / Subjective:  
  F/u headache Headache improved Still complains of some dizziness specially when stands up Assessment & Plan:  
 
SAH: 
-CT head 1/10 Trace subarachnoid hemorrhage between the anteroinferior frontal lobes. No mass effect. Occiput laceration with small to moderate scalp hematoma 
-Repeat CT head  Redistribution of small amount of subarachnoid hemorrhage from the anterior inferior frontal region to the right sylvian fissure. No new intracranial abnormality 
-Appreciate discussion with Neurosurgery, cleared. Avoid NSAIDS/ASA for 2 weeks 
-BP control Concussive syndrome 
-Fioricet/pain control 
  
HTN: 
-continue home lisinopril 
-PRN IV labetalol 
  
Anxiety 
-Continue Paxil PT/OT HHOT/PT Cardiac diet Code status: FULL CODE 
DVT prophylaxis: Scd PTA: home Plan: Discharge home tomorrow if continues to remain stable Care Plan discussed with: Patient/Family Disposition: Home w/Family Hospital Problems  Date Reviewed: 2019 Codes Class Noted POA * (Principal) Subarachnoid hemorrhage following injury ([facility] [address] ICD-10-CM: K89.5A2W 
ICD-9-CM: 852.00  1/10/2019 Yes Review of Systems: A comprehensive review of systems was negative except for that written in the HPI. Vital Signs: Last 24hrs VS reviewed since prior progress note. Most recent are: 
Visit Vitals /65 (BP 1 Location: Left arm, BP Patient Position: At rest) Pulse 76 Temp 98.6 °F (37 °C) Resp 21 Ht 5' 7\" (1.702 m) Wt 92.9 kg (204 lb 12.8 oz) SpO2 97% Breastfeeding? No  
BMI 32.08 kg/m² Intake/Output Summary (Last 24 hours) at 1/12/2019 0710 Last data filed at 1/11/2019 6694 Gross per 24 hour Intake  Output 275 ml Net -275 ml Physical Examination:  
 
 
     
Constitutional:  No acute distress, cooperative, pleasant   
ENT:  Oral mucous moist, oropharynx benign. Neck supple, Resp:  CTA bilaterally. No wheezing/rhonchi/rales. No accessory muscle use CV:  Regular rhythm, normal rate, no murmurs, gallops, rubs GI:  Soft, non distended, non tender. normoactive bowel sounds, no hepatosplenomegaly Musculoskeletal:  No edema, warm, 2+ pulses throughout Neurologic:  Moves all extremities. AAOx3, CN II-XII reviewed Skin:  Good turgor, no rashes or ulcers Data Review:  
 Review and/or order of clinical lab test 
 
 
Labs:  
 
Recent Labs  
  01/10/19 
2040 WBC 8.6 HGB 13.6 HCT 42.2  Recent Labs  
  01/10/19 
2040   
K 4.0  
 CO2 25 BUN 22* CREA 1.07* * CA 9.3 No results for input(s): SGOT, GPT, ALT, AP, TBIL, TBILI, TP, ALB, GLOB, GGT, AML, LPSE in the last 72 hours. No lab exists for component: AMYP, HLPSE Recent Labs  
  01/10/19 
2040 INR 1.0 PTP 9.6 No results for input(s): FE, TIBC, PSAT, FERR in the last 72 hours. No results found for: FOL, RBCF No results for input(s): PH, PCO2, PO2 in the last 72 hours. No results for input(s): CPK, CKNDX, TROIQ in the last 72 hours. No lab exists for component: CPKMB Lab Results Component Value Date/Time  Cholesterol, total 271 (H) 09/30/2016 12:05 PM  
 HDL Cholesterol 65 09/30/2016 12:05 PM  
 LDL, calculated 187 (H) 09/30/2016 12:05 PM  
 Triglyceride 94 09/30/2016 12:05 PM  
 
No results found for: Mauricio Blanks No results found for: COLOR, APPRN, SPGRU, REFSG, VENITA, PROTU, GLUCU, KETU, BILU, UROU, SHAYLA, LEUKU, GLUKE, EPSU, BACTU, WBCU, RBCU, CASTS, UCRY Medications Reviewed:  
 
Current Facility-Administered Medications Medication Dose Route Frequency  butalbital-acetaminophen-caffeine (FIORICET, ESGIC) -40 mg per tablet 1 Tab  1 Tab Oral Q4H PRN  
 metoprolol tartrate (LOPRESSOR) tablet 12.5 mg  12.5 mg Oral Q12H  
 HYDROcodone-acetaminophen (NORCO) 5-325 mg per tablet 1 Tab  1 Tab Oral Q6H PRN  
 hydrALAZINE (APRESOLINE) 20 mg/mL injection 10 mg  10 mg IntraVENous Q6H PRN  
 metoprolol tartrate (LOPRESSOR) tablet 12.5 mg  12.5 mg Oral Q6H PRN  
 ondansetron (ZOFRAN) injection 4 mg  4 mg IntraVENous Q6H PRN  
 acetaminophen (TYLENOL) tablet 650 mg  650 mg Oral Q4H PRN Or  
 acetaminophen (TYLENOL) solution 650 mg  650 mg Per NG tube Q4H PRN Or  
 acetaminophen (TYLENOL) suppository 650 mg  650 mg Rectal Q4H PRN  
 lisinopril (PRINIVIL, ZESTRIL) tablet 20 mg  20 mg Oral DAILY  pantoprazole (PROTONIX) tablet 40 mg  40 mg Oral DAILY  PARoxetine (PAXIL) tablet 40 mg  40 mg Oral DAILY  
 
______________________________________________________________________ EXPECTED LENGTH OF STAY: 2d 2h 
ACTUAL LENGTH OF STAY:          2 Tyra Sommer MD

## 2019-01-12 NOTE — PROGRESS NOTES
Stroke Education documented in Patient Education: YES Core Measures Documented in [facility]: 
Risk Factors: YES Warning signs of stroke: YES When to Activate 911: YES Medication Education for Risk Factors: YES Smoking cessation if applicable: YES Written Education Given:  Aliyah Aleman Discharge NIH Completed: YES Score: 0 BRAINS: YES Follow Up Appointment Made: NO 
Date/Time if applicable: Follow up with PCP Bedside RN performed patient education and medication education. Discharge concerns initiated and discussed with patient, including clarification on \"who\" assists the patient at their home and instructions for when the home going patient should call their provider after discharge. Opportunity for questions and clarification was provided. Patient receptive to education: YES Patient stated: I will go to outpatient concussion rehab Barriers to Education: none Diagnosis Education given:  YES Length of stay: 2 Expected Day of Discharge: 1/12/19 Ask if they have \"Help at Home\" & add to white board? YES Education Day #: 2 Medication Education Given:  YES 
M in the box Medication name: Virgle Mcburney Pt aware of HCAHPS survey: YES I have reviewed discharge instructions with the patient. The patient verbalized understanding.

## 2019-01-12 NOTE — PROGRESS NOTES
Problem: Falls - Risk of 
Goal: *Absence of Falls Document Silvia Bourgeois Fall Risk and appropriate interventions in the flowsheet. Outcome: Progressing Towards Goal 
Fall Risk Interventions: 
Mobility Interventions: OT consult for ADLs, Patient to call before getting OOB, PT Consult for mobility concerns, PT Consult for assist device competence Medication Interventions: Evaluate medications/consider consulting pharmacy, Teach patient to arise slowly Elimination Interventions: Call light in reach, Toileting schedule/hourly rounds History of Falls Interventions: Room close to nurse's station, Door open when patient unattended

## 2019-01-12 NOTE — DISCHARGE SUMMARY
Discharge Summary       PATIENT ID: Chito Soares  MRN: [id number]   YOB: 1960    DATE OF ADMISSION: 1/10/2019  7:34 PM    DATE OF DISCHARGE: 1/12/2019   PRIMARY CARE PROVIDER: Ching Phan MD     ATTENDING PHYSICIAN: Dr Angelita Vogel  DISCHARGING PROVIDER: Angelita Vogel MD    To contact this individual call [phone] and ask the  to page. If unavailable ask to be transferred the Adult Hospitalist Department. CONSULTATIONS: IP CONSULT TO NEUROSURGERY  IP CONSULT TO HOSPITALIST    PROCEDURES/SURGERIES: * No surgery found *    ADMITTING [address] [place] [address] COURSE:   SAH:  -CT head 1/10 Trace subarachnoid hemorrhage between the anteroinferior frontal lobes. No mass effect. Occiput laceration with small to moderate scalp hematoma  -Repeat CT head 1/11 Redistribution of small amount of subarachnoid hemorrhage from the anterior inferior frontal region to the right sylvian fissure. No new intracranial abnormality  -Appreciate discussion with Neurosurgery, cleared. Avoid NSAIDS/ASA for 2 weeks  -BP control    Concussive syndrome  -Fioricet/pain control     HTN:  -continue home lisinopril  -PRN IV labetalol     Anxiety  -Continue Paxil    PT/OT outpatient    Cardiac diet    Code status: FULL CODE  DVT prophylaxis: Scd  PTA: home        DISCHARGE DIAGNOSES / PLAN:      1. Subarachnoid hemorrhage  2.  Headache       PENDING TEST RESULTS:   At the time of discharge the following test results are still pending: none    FOLLOW UP APPOINTMENTS:    Follow-up Information     Follow up With Specialties Details Why [address] Semaj Montiel MD [facility], Sports Medicine In 1 week  [address] [place] [id number] [phone]             ADDITIONAL CARE RECOMMENDATIONS:   Follow up with PMD    DIET: Cardiac Diet    ACTIVITY: Activity as tolerated      DISCHARGE MEDICATIONS:  Current Discharge Medication List      START taking these medications    Details butalbital-acetaminophen-caffeine (FIORICET, ESGIC) -40 mg per tablet Take 1 Tab by mouth every four (4) hours as needed for Headache. Qty: 15 Tab, Refills: 0    Associated Diagnoses: Subarachnoid hemorrhage following injury, no loss of consciousness, initial encounter ([facility])      HYDROcodone-acetaminophen (NORCO) 5-325 mg per tablet Take 1 Tab by mouth every six (6) hours as needed. Max Daily Amount: 4 Tabs. Qty: 5 Tab, Refills: 0    Associated Diagnoses: Subarachnoid hemorrhage following injury, no loss of consciousness, initial encounter ([facility] [address]         CONTINUE these medications which have NOT CHANGED    Details   PARoxetine (PAXIL) 40 mg tablet TAKE 1 TABLET BY MOUTH DAILY  Qty: 90 Tab, Refills: 3    Comments: **Patient requests 90 days supply**      lisinopril (PRINIVIL, ZESTRIL) 20 mg tablet Take 1 Tab by mouth daily. Qty: 90 Tab, Refills: 3      timolol (TIMOPTIC) 0.5 % ophthalmic solution INSTILL 1 DROP IN RIGHT EYE IN THE MORNING      omeprazole (PRILOSEC) 20 mg capsule Take 20 mg by mouth daily. NOTIFY YOUR PHYSICIAN FOR ANY OF THE FOLLOWING:   Fever over 101 degrees for 24 hours. Chest pain, shortness of breath, fever, chills, nausea, vomiting, diarrhea, change in mentation, falling, weakness, bleeding. Severe pain or pain not relieved by medications. Or, any other signs or symptoms that you may have questions about.     DISPOSITION:   x Home With:   OT  PT  HH  RN       Long term SNF/Inpatient Rehab    Independent/assisted living    Hospice    Other:       PATIENT CONDITION AT DISCHARGE:     Functional status    Poor     Deconditioned    x Independent      Cognition   x  Lucid     Forgetful     Dementia      Catheters/lines (plus indication)    Davila     PICC     PEG    x None      Code status   x  Full code     DNR      PHYSICAL EXAMINATION AT DISCHARGE:  Please see progress note      CHRONIC MEDICAL DIAGNOSES:  Problem List as of 1/12/2019 Date Reviewed: 1/12/2019 Codes Class Noted - Resolved    * (Principal) Subarachnoid hemorrhage following injury [facility]) ICD-10-CM: Z81.7S2D  ICD-9-CM: 852.00  1/10/2019 - Present              Greater than 39 minutes were spent with the patient on counseling and coordination of care    Signed:   Ronda Madden MD  1/12/2019  9:26 AM

## 2019-01-12 NOTE — PROGRESS NOTES
Hospitalist Progress Note Fercho Cardoza MD 
Answering service: [phone] from in house phone Cell: [phone] Date of Service:  2019 NAME:  Desmond Owens :  1960 MRN:  [id number] Admission Summary:  
60 y.o lady with HTN who was ice skating and fell and hit the back of her head. She felt vertiginous afterwards and was taken to the ED. Work-up there revealed tiny left frontal SAH and she was referred for admission. She currently complains of vertigo with minimal movement. No nausea or vomiting, no dyspnea. Back of head hurts where she fell. Interval history / Subjective:  
  F/u headache Headache improved Feels much better Assessment & Plan:  
 
SAH: 
-CT head 1/10 Trace subarachnoid hemorrhage between the anteroinferior frontal lobes. No mass effect. Occiput laceration with small to moderate scalp hematoma 
-Repeat CT head  Redistribution of small amount of subarachnoid hemorrhage from the anterior inferior frontal region to the right sylvian fissure. No new intracranial abnormality 
-Appreciate discussion with Neurosurgery, cleared. Avoid NSAIDS/ASA for 2 weeks 
-BP control Concussive syndrome 
-Fioricet/pain control 
  
HTN: 
-continue home lisinopril/Metoprolol 
-PRN IV labetalol 
  
Anxiety 
-Continue Paxil PT/OT outpatient Cardiac diet Code status: FULL CODE 
DVT prophylaxis: Scd PTA: home Plan: Discharge home Care Plan discussed with: Patient/Family Disposition: Home w/Family Hospital Problems  Date Reviewed: 2019 Codes Class Noted POA * (Principal) Subarachnoid hemorrhage following injury ([facility] [address] ICD-10-CM: T01.0Z4L 
ICD-9-CM: 852.00  1/10/2019 Yes Review of Systems: A comprehensive review of systems was negative except for that written in the HPI. Vital Signs:  
 Last 24hrs VS reviewed since prior progress note. Most recent are: Visit Vitals /65 (BP 1 Location: Left arm, BP Patient Position: At rest) Pulse 76 Temp 98.6 °F (37 °C) Resp 21 Ht 5' 7\" (1.702 m) Wt 92.9 kg (204 lb 12.8 oz) SpO2 97% Breastfeeding? No  
BMI 32.08 kg/m² No intake or output data in the 24 hours ending 01/12/19 4544 Physical Examination:  
 
 
     
Constitutional:  No acute distress, cooperative, pleasant   
ENT:  Oral mucous moist, oropharynx benign. Neck supple, Resp:  CTA bilaterally. No wheezing/rhonchi/rales. No accessory muscle use CV:  Regular rhythm, normal rate, no murmurs, gallops, rubs GI:  Soft, non distended, non tender. normoactive bowel sounds, no hepatosplenomegaly Musculoskeletal:  No edema, warm, 2+ pulses throughout Neurologic:  Moves all extremities. AAOx3, CN II-XII reviewed Skin:  Good turgor, no rashes or ulcers Data Review:  
 Review and/or order of clinical lab test 
 
 
Labs:  
 
Recent Labs  
  01/10/19 
2040 WBC 8.6 HGB 13.6 HCT 42.2  Recent Labs  
  01/10/19 
2040   
K 4.0  
 CO2 25 BUN 22* CREA 1.07* * CA 9.3 No results for input(s): SGOT, GPT, ALT, AP, TBIL, TBILI, TP, ALB, GLOB, GGT, AML, LPSE in the last 72 hours. No lab exists for component: AMYP, HLPSE Recent Labs  
  01/10/19 
2040 INR 1.0 PTP 9.6 No results for input(s): FE, TIBC, PSAT, FERR in the last 72 hours. No results found for: FOL, RBCF No results for input(s): PH, PCO2, PO2 in the last 72 hours. No results for input(s): CPK, CKNDX, TROIQ in the last 72 hours. No lab exists for component: CPKMB Lab Results Component Value Date/Time Cholesterol, total 271 (H) 09/30/2016 12:05 PM  
 HDL Cholesterol 65 09/30/2016 12:05 PM  
 LDL, calculated 187 (H) 09/30/2016 12:05 PM  
 Triglyceride 94 09/30/2016 12:05 PM  
 
No results found for: Altamease Patience No results found for: COLOR, APPRN, SPGRU, REFSG, VENITA, PROTU, GLUCU, KETU, Pedro Soares, SHAYLA, LEUKU, GLUKE, EPSU, BACTU, WBCU, RBCU, CASTS, UCRY Medications Reviewed:  
 
Current Facility-Administered Medications Medication Dose Route Frequency  butalbital-acetaminophen-caffeine (FIORICET, ESGIC) -40 mg per tablet 1 Tab  1 Tab Oral Q4H PRN  
 metoprolol tartrate (LOPRESSOR) tablet 12.5 mg  12.5 mg Oral Q12H  
 HYDROcodone-acetaminophen (NORCO) 5-325 mg per tablet 1 Tab  1 Tab Oral Q6H PRN  
 hydrALAZINE (APRESOLINE) 20 mg/mL injection 10 mg  10 mg IntraVENous Q6H PRN  
 metoprolol tartrate (LOPRESSOR) tablet 12.5 mg  12.5 mg Oral Q6H PRN  
 ondansetron (ZOFRAN) injection 4 mg  4 mg IntraVENous Q6H PRN  
 acetaminophen (TYLENOL) tablet 650 mg  650 mg Oral Q4H PRN Or  
 acetaminophen (TYLENOL) solution 650 mg  650 mg Per NG tube Q4H PRN Or  
 acetaminophen (TYLENOL) suppository 650 mg  650 mg Rectal Q4H PRN  
 lisinopril (PRINIVIL, ZESTRIL) tablet 20 mg  20 mg Oral DAILY  pantoprazole (PROTONIX) tablet 40 mg  40 mg Oral DAILY  PARoxetine (PAXIL) tablet 40 mg  40 mg Oral DAILY  
 
______________________________________________________________________ EXPECTED LENGTH OF STAY: 2d 2h 
ACTUAL LENGTH OF STAY:          2 Lexi Frost MD

## 2019-01-12 NOTE — PROGRESS NOTES
Problem: Mobility Impaired (Adult and Pediatric) Goal: *Acute Goals and Plan of Care (Insert Text) physical Therapy TREATMENT Patient: Richard Yan (46 y.o. female) Date: 1/12/2019 Diagnosis: Subarachnoid hemorrhage following injury ([facility] [address] [S06.6X9A] Subarachnoid hemorrhage following injury ([facility] [address] [S06.6X9A] Subarachnoid hemorrhage following injury ([facility] [address] Precautions: Fall Chart, physical therapy assessment, plan of care and goals were reviewed. ASSESSMENT: Patient able to perform supine to sit independently. Able to stand using arms independently. Patient ambulated 200 feet independently and able to negotiate 16 steps with 1 rails with modified independence. Patient safe to return home with assist as needed from significant other. Progression toward goals: 
[x]    Improving appropriately and progressing toward goals 
[]    Improving slowly and progressing toward goals 
[]    Not making progress toward goals and plan of care will be adjusted PLAN: 
Patient continues to benefit from skilled intervention to address the above impairments. Continue treatment per established plan of care. Discharge Recommendations:  Outpatient and None Further Equipment Recommendations for Discharge:  none SUBJECTIVE:  
Patient stated I am so much better.  OBJECTIVE DATA SUMMARY:  
Critical Behavior: 
Neurologic State: Alert, Appropriate for age Orientation Level: Oriented to person, Oriented to place, Oriented to situation, Oriented to time Cognition: Appropriate decision making, Appropriate for age attention/concentration, Appropriate safety awareness, Follows commands Safety/Judgement: Awareness of environment, Fall prevention Functional Mobility Training: 
Bed Mobility: 
Rolling: Independent Supine to Sit: Independent Sit to Supine: Independent Transfers: 
Sit to Stand: Modified independent Stand to Sit: Modified independent Balance: 
Sitting: Intact Standing: Intact Standing - Static: Good Standing - Dynamic : GoodAmbulation/Gait Training: 
Distance (ft): 200 Feet (ft) Assistive Device: Gait belt(per protocol) Ambulation - Level of Assistance: Independent Gait Abnormalities: Decreased step clearance Base of Support: Narrowed Speed/Olya: Slow Stairs: 
Number of Stairs Trained: 16 
Stairs - Level of Assistance: Modified independent Rail Use: Right Pain: 
Pain Scale 1: Numeric (0 - 10) Pain Intensity 1: 0 Activity Tolerance:  
 
Please refer to the flowsheet for vital signs taken during this treatment. After treatment:  
[]    Patient left in no apparent distress sitting up in chair 
[x]    Patient left in no apparent distress in bed 
[x]    Call bell left within reach [x]    Nursing notified 
[]    Caregiver present 
[]    Bed alarm activated COMMUNICATION/COLLABORATION:  
The patients plan of care was discussed with: Registered Nurse Marilyn Chang PT Time Calculation: 17 mins

## 2019-01-15 ENCOUNTER — PATIENT OUTREACH (OUTPATIENT)
Dept: INTERNAL MEDICINE CLINIC | Age: 59
End: 2019-01-15

## 2019-01-15 ENCOUNTER — OFFICE VISIT (OUTPATIENT)
Dept: INTERNAL MEDICINE CLINIC | Age: 59
End: 2019-01-15

## 2019-01-15 ENCOUNTER — HOSPITAL ENCOUNTER (OUTPATIENT)
Dept: PHYSICAL THERAPY | Age: 59
Discharge: HOME | End: 2019-01-15
Payer: COMMERCIAL

## 2019-01-15 VITALS
HEART RATE: 74 BPM | HEIGHT: 67 IN | OXYGEN SATURATION: 97 % | RESPIRATION RATE: 16 BRPM | BODY MASS INDEX: 31.97 KG/M2 | WEIGHT: 203.7 LBS | SYSTOLIC BLOOD PRESSURE: 139 MMHG | DIASTOLIC BLOOD PRESSURE: 90 MMHG | TEMPERATURE: 98.5 F

## 2019-01-15 DIAGNOSIS — S06.6X0A SUBARACHNOID HEMORRHAGE FOLLOWING INJURY, NO LOSS OF CONSCIOUSNESS, INITIAL ENCOUNTER (HCC): Primary | ICD-10-CM

## 2019-01-15 DIAGNOSIS — M54.50 LOW BACK PAIN: ICD-10-CM

## 2019-01-15 DIAGNOSIS — Z12.39 BREAST CANCER SCREENING: ICD-10-CM

## 2019-01-15 DIAGNOSIS — S06.0X0A CONCUSSION WITHOUT LOSS OF CONSCIOUSNESS, INITIAL ENCOUNTER: ICD-10-CM

## 2019-01-15 PROCEDURE — 97110 THERAPEUTIC EXERCISES: CPT

## 2019-01-15 PROCEDURE — 97112 NEUROMUSCULAR REEDUCATION: CPT

## 2019-01-15 PROCEDURE — 97535 SELF CARE MNGMENT TRAINING: CPT

## 2019-01-15 PROCEDURE — 97161 PT EVAL LOW COMPLEX 20 MIN: CPT

## 2019-01-15 NOTE — PROGRESS NOTES
[employer] [facility] [id number] [address], [address] [place] [address] [address] Phone: [phone]  Fax: [phone] Plan of Care/Statement of Necessity for Physical Therapy Services  2-15 Patient name: Olivia Howard  : 1960  Provider#: [id number] Referral source: Nicole Kat MD     
Medical/Treatment Diagnosis: Concussion [S06.0X9A] Prior Hospitalization: see medical history Comorbidities: HTN, anxiety/depression Prior Level of Function: retired Medications: Verified on Patient Summary List 
 
Start of Care: 01/15/2019     Onset Date: 01/10/2019 The Plan of Care and following information is based on the information from the initial evaluation. Assessment/ key information: Pt is a 60year old female who is referred to Physical Therapy by [facility] emergency room MD for concussion and muscle strain due to fall while ice skating on 01/10/2019. Pt demonstrates vestibulo-occular deficits consistent with concussion as well as cervical musculoskeletal impairments. Patient will benefit from skilled PT services to modify and progress therapeutic interventions, address functional mobility deficits, address ROM deficits, address strength deficits, analyze and address soft tissue restrictions, analyze and cue movement patterns, analyze and modify body mechanics/ergonomics, assess and modify postural abnormalities and address imbalance/dizziness to attain pt/PT goals. Evaluation Complexity History MEDIUM  Complexity : 1-2 comorbidities / personal factors will impact the outcome/ POC ; Examination HIGH Complexity : 4+ Standardized tests and measures addressing body structure, function, activity limitation and / or participation in recreation  ;Presentation LOW Complexity : Stable, uncomplicated  ;Clinical Decision Making MEDIUM Complexity : FOTO score of 26-74 Overall Complexity Rating: LOW Problem List: pain affecting function, decrease ROM, decrease strength, impaired gait/ balance, decrease ADL/ functional abilitiies, decrease activity tolerance, decrease flexibility/ joint mobility and decrease transfer abilities Treatment Plan may include any combination of the following: Therapeutic exercise, Therapeutic activities, Neuromuscular re-education, Physical agent/modality, Gait/balance training, Manual therapy and Patient education Patient / Family readiness to learn indicated by: asking questions, trying to perform skills and interest 
Persons(s) to be included in education: patient (P) Barriers to Learning/Limitations: None Patient Goal (s): Ease of symptoms related to concussion.  Patient Self Reported Health Status: good Rehabilitation Potential: good Short Term Goals: To be accomplished in 3 weeks: 
1) Pt will be independent with HEP. 2) Pt will be able to sit with upright posture >/= 5 minutes without increase of symptoms. 3) Pt will report improvement in cervical Rotation to look over shoulders while driving. 4) Perform VOR Cancellation in sitting for >/=30 seconds before onset of symptoms. 5) Balance on firm surface with eyes closed >/= 30 seconds before onset of symptoms. Long Term Goals: To be accomplished in 6 weeks: 
1) Pt will be able to read without increase of neck pain. 2) Pt will be able to look over shoulders while driving without increase of neck pain. 3) Pt will demonstrate ability to lift >/= 15 lbs without increase of symptoms. 4) Perform VOR Cancellation while standing without reproduction of symptoms. 5) Balance on soft surface with eyes closed >/= 30 seconds without symptoms. 6) Tolerate visual conflict while walking without symptoms. Frequency / Duration: Patient to be seen 2 times per week for 6 weeks. Patient/ Caregiver education and instruction: self care, activity modification and exercises 
 
[x]  Plan of care has been reviewed with RAHAT Marshall PT, DPT   1/15/2019 12:59 PM 
 
 ________________________________________________________________________ I certify that the above Therapy Services are being furnished while the patient is under my care. I agree with the treatment plan and certify that this therapy is necessary. [de-identified] Signature:____________________  Date:____________Time: _________
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
## 2019-01-15 NOTE — PROGRESS NOTES
Chief Complaint Patient presents with  Concussion HPI:  she is a 60y.o. year old female who presents for evaluation of concussion [facility] - Initial Evaluation Referring Provider: Keven Bruce Date of Injury: 1/10/19 Mechanism of Injury: ice skating and landed on the back of her head Removed from play?:Not applicable Amnesia: 
     Retrograde 0 minutes Anterograde 0 minutes Red Flags: 
Worsening headaches - Yes Severe neck pain - Yes Very sleepy - No 
Can't recognize people or places  - No 
Deteriorating consciousness  - No 
Increasing confusion or irritability - No 
Worsening vomiting  - No 
Slurred speech  - No 
Focal neurological signs - No 
Weakness/numbness in limbs  - No 
Severe behavior change - No 
Seizures (after the initial event) - No 
 
 
Initial Management: 
     Physical exertion: Yes 
     School attendance: Not applicable Cognitive rest: Yes Imaging: Yes Previous Concussions (include dates, duration and any treatments): Yes Any increasing concussability:No 
 
Have subsequent concussions been more severe:No 
 
Developmental History: 
     Learning disability: No 
     ADHD: no Other developmental abnormalities No 
 
Medical history that may modify recovery: 
     Headaches: No 
     Migraine Headaches: No 
     Epilepsy: No 
     Thyroid disease: No 
     History of CNS infection: No 
 
Psychiatric history that may modify recovery: Anxiety: Yes Depression: Yes Sleep disorder: No 
 
Reviewed and agree with Nurse Note and duplicated in this note. Reviewed PmHx, RxHx, FmHx, SocHx, AllgHx and updated and dated in the chart. Family History Problem Relation Age of Onset  Macular Degen Mother  Hypertension Father Past Medical History:  
Diagnosis Date  Hypertension Social History Socioeconomic History  Marital status:  Spouse name: Not on file  Number of children: Not on file  Years of education: Not on file  Highest education level: Not on file Tobacco Use  Smoking status: Never Smoker  Smokeless tobacco: Never Used Substance and Sexual Activity  Alcohol use: Yes Comment: 2 drinks per week  Drug use: No  
 Sexual activity: Yes  
  Partners: Female Birth control/protection: None Review of Systems - negative except as listed above Objective:  
 
Vitals:  
 01/15/19 1416 BP: 139/90 Pulse: 74 Resp: 16 Temp: 98.5 °F (36.9 °C) TempSrc: Oral  
SpO2: 97% Weight: 203 lb 11.2 oz (92.4 kg) Height: 5' 7\" (1.702 m) Physical Examination: General appearance - alert, well appearing, and in no distress Eyes - right pupil/eye old injury, left pupil equal and reactive, extraocular eye movements intact Ears - bilateral TM's and external ear canals normal 
Nose - normal and patent, no erythema, discharge or polyps Mouth - mucous membranes moist, pharynx normal without lesions Neck - supple, no significant adenopathy Back exam - full range of motion, no tenderness, palpable spasm or pain on motion NEUROLOGIC:   
 Cranial nerves II  XII: Intact Speech: Normal.   
 Face: Symmetrical 
 Extremities: Moving all equally, well perfused, and no edema. DTR: WNL, equal and symmetric Strength and sensation: Grossly intact. Gait: Normal 
  
Able to perform 3 word recall at 1 min and 5 min. Able to spell WORLD frontwards and backwards. Serial 7s intact. Long term memory intact (remembers phone number, address, friends names). Vestibular-Ocular Screening: 
Ocular-Motor: 
 Smooth Pursuits (\"H-Test\"):  Negative Saccades (Vertical/Horizontal):  Negative Convergence (<6cm):  Negative Accomodation (<6cm):  Positive Vestibular-Ocular: 
 Gaze Stability: (Vertical/Horizontal): Negative VOR cancellation:  Positive Balance Examination: 
 Romberg, compliant Foam 
   Eyes Open:  Positive Eyes Closed:  Positive SCAT3 Scores - Date  Symptom Score 1/15/19 32 Comprehensive evaluation, administration and interpretation of SCAT3/Impact Neuro Psych testing completed at office visit today. Results scanned into chart. 1. Brief discussion with  10 minutes 2. Interview & brief neurological  
and balance exam with athlete 40 minutes 3. Brief interview with parent (if a minor) 15 minutes 4. ImPACT testing (patient alone) 30 minutes 5. Review results and discuss concussion  
and return to play with athlete and parent 20 minutes 6. Brief report (dictated) 20 minutes 7. Feedback with ATC next day 15 minutes 8. Feedback with parent two days later 15 minutes Spent 60min face-to-face, >50% spent on counseling & patient education. Assessment/ Plan:  
Diagnoses and all orders for this visit: 1. Subarachnoid hemorrhage following injury, no loss of consciousness, initial encounter ([facility] [address] 2. Low back pain -     XR SPINE LUMB 2 OR 3 V; Future 3. Concussion without loss of consciousness, initial encounter Follow-up Disposition: 
Return in about 2 weeks (around 1/29/2019). 1. Rest.  No sports or exercise until cleared. 2. Concussions typically results in the rapid onset of short-lived impairment that resolves spontaneously over time (usually within 1 week - 1 month). 3.  Vestibular Rehab Referral - Eval/Therapy  yes Signs to watch for: 
Problems could arise over the first 24-48 hours. You should not be left alone and must go to a hospital at once it you: 1. Have a headache that gets worse. 2. Are very drowsy or cant be awakened (woken up). 3. Cant recognize people or places. 4. Have repeated vomiting 5. Behave unusually or seem confused; are very irritable. 6. Have seizures (arms and legs jerk uncontrollably). 7. Are unsteady on your feet; have slurred speech; vision or hearing changes. Return to Play: Handout for 5 stage RTP given and explained to patient will hold from activities/sports for 7 days. A structured, graded exertion protocol should be developed; individualized on the basis of sport, age and the concussion history of the athlete. Exercise or training should be commenced only after the athlete is clearly asymptomatic with physical and cognitive rest. Final decision for clearance to return to competition should ideally be made by a medical doctor. When returning athletes to play, they should follow a stepwise symptom-limited program, with stages of progression. For example: 1. rest until asymptomatic (physical and mental rest) 2. light aerobic exercise (e.g. stationary cycle) 3. sport-specific exercise 4. non-contact training drills (start light resistance training) 5. full contact training after medical clearance 6. return to competition (game play) There should be approximately 24 hours (or longer) for each stage and the athlete should return to stage 1 if symptoms recur. Resistance training should only be added in the later stages. Medical clearance should be given before return to play. Follow up in 14 Days, will consider Impact/Scat3 test at next visit I have discussed the diagnosis with the patient and the intended plan as seen in the above orders. The patient has received an after-visit summary and questions were answered concerning future plans. Medication Side Effects and Warnings were discussed with patient, Patient Labs were reviewed and or requested, and 
Patient Past Records were reviewed and or requested  yes Pt agrees to call or return to clinic and/or go to closest ER with any worsening of symptoms. This may include, but not limited to increased fever (>100.4) with NSAIDS or Tylenol, increased edema, confusion, rash, worsening of presenting symptoms.

## 2019-01-15 NOTE — PROGRESS NOTES
[facility] 1/:  Subarachnoid Hemorrhage post Injury (no loss of consciousness) Hospital Discharge Follow-Up Date/Time:  1/15/2019 1:25 PM 
 
Patient was admitted to [facility] on 1/10 and discharged on 2019 for Fall/Subarachnoid Hemmorrhage. The physician discharge summary was available at the time of outreach. Patient was contacted within 2 business days of discharge. Top Challenges reviewed with the provider  
none Method of communication with provider :none Inpatient RRAT score: none given Was this a readmission? no  
Patient stated reason for the readmission: n/a Nurse Navigator (NN) contacted the patient at office visit to perform post hospital discharge assessment. Verified name and  with patient as identifiers. Provided introduction to self, and explanation of the Nurse Navigator role. Reviewed discharge instructions and red flags with patient who verbalized understanding. Patient given an opportunity to ask questions and does not have any further questions or concerns at this time. The patient agrees to contact the PCP office for questions related to their healthcare. NN provided contact information for future reference. Disease Specific:   none Summary of patient's top problems: 1. Post fall precautions/head injury precautions. Home Health orders at discharge: Outpatient PT Home Health company: [employer] Date of initial visit: 1/15/2019 Durable Medical Equipment ordered/company: none Durable Medical Equipment received: n/a Barriers to care? none Advance Care Planning:  
Does patient have an Advance Directive:  not on file; education provided Medication(s):  
New Medications at Discharge:  
butalbital-acetaminophen-caffeine (FIORICET, ESGIC) -40 mg per tablet Take 1 Tab by mouth every four (4) hours as needed for Headache. Qty: 15 Tab, Refills: 0   Associated Diagnoses: Subarachnoid hemorrhage following injury, no loss of consciousness, initial encounter ([facility])  
   
HYDROcodone-acetaminophen (NORCO) 5-325 mg per tablet Take 1 Tab by mouth every six (6) hours as needed. Max Daily Amount: 4 Tabs. Qty: 5 Tab, Refills: 0 Changed Medications at Discharge: none Discontinued Medications at Discharge: none Medication reconciliation was performed by PCP with patient, who verbalizes understanding of administration of home medications. There were no barriers to obtaining medications identified at this time. Referral to Pharm D needed: no  
 
Current Outpatient Medications Medication Sig  butalbital-acetaminophen-caffeine (FIORICET, ESGIC) -40 mg per tablet Take 1 Tab by mouth every four (4) hours as needed for Headache.  HYDROcodone-acetaminophen (NORCO) 5-325 mg per tablet Take 1 Tab by mouth every six (6) hours as needed. Max Daily Amount: 4 Tabs.  PARoxetine (PAXIL) 40 mg tablet TAKE 1 TABLET BY MOUTH DAILY  lisinopril (PRINIVIL, ZESTRIL) 20 mg tablet Take 1 Tab by mouth daily.  timolol (TIMOPTIC) 0.5 % ophthalmic solution INSTILL 1 DROP IN RIGHT EYE IN THE MORNING  
 omeprazole (PRILOSEC) 20 mg capsule Take 20 mg by mouth daily. No current facility-administered medications for this visit. There are no discontinued medications. [facility] follow up appointment(s):  
Future Appointments Date Time Provider Ute Palencia 1/17/2019  1:30 PM Blanca Pastor, PT [facility]  
1/22/2019 10:30 AM Blanca Pastor, PT [facility]  
1/24/2019 12:30 PM Blanca Pastor, PT [facility]  
1/29/2019  1:15 PM Hayley Salcedo MD [id number] [facility]  
1/29/2019  2:00 PM Blanca Pastor, PT [facility]  
1/31/2019  1:30 PM Blanca Pastor, PT [facility]  
2/5/2019  1:00 PM Blanca Pastor, PT [facility]  
 2/7/2019  1:00 PM Kareem Pastor [place]foot, PT [facility]  
2/12/2019  1:00 PM Kareem Pastor [place], PT [facility]  
2/14/2019  1:00 PM Kareem Pastor [place], PT [facility] Non[facility] follow up appointment(s): no 
Dispatch Health:  n/a  
 
Goals Addressed This Visit's Progress  Demonstrate self care to prevent worsening 1/15/2019:  Fall Prevention teachings & d/c review done. EW  
  
  
 
Goals None

## 2019-01-17 ENCOUNTER — HOSPITAL ENCOUNTER (OUTPATIENT)
Dept: PHYSICAL THERAPY | Age: 59
Discharge: HOME | End: 2019-01-17
Payer: COMMERCIAL

## 2019-01-17 PROCEDURE — 97112 NEUROMUSCULAR REEDUCATION: CPT

## 2019-01-17 PROCEDURE — 97110 THERAPEUTIC EXERCISES: CPT

## 2019-01-17 PROCEDURE — 97140 MANUAL THERAPY 1/> REGIONS: CPT

## 2019-01-17 NOTE — PROGRESS NOTES
PT DAILY TREATMENT NOTE - [facility] 2-15 Patient Name: Fabian Cruz Date:2019 : 1960 [x]  Patient  Verified Payor: BLUE CROSS / [place]: [place] [employer] / Product Type: PPO / In time:1:30pm  Out time:2:35pm 
Total Treatment Time (min): 65 Total Timed Codes (min): 55 
1:1 Treatment Time ([facility] only): 40 Visit #: 2 Treatment Area: Concussion [S06.0X9A] SUBJECTIVE Pain Level (0-10 scale): 0/10 Any medication changes, allergies to medications, adverse drug reactions, diagnosis change, or new procedure performed?: [x] No    [] Yes (see summary sheet for update) Subjective functional status/changes:   [] No changes reported Pt reports compliance with HEP. Pt complains of dizziness mostly when she looks up, down or to the left. OBJECTIVE Modality rationale: increase tissue extensibility to improve the patients ability to perform functional activities with increased ease Type Additional Details  
[] Estim: []Att   []Unatt    []TENS instruct []IFC  []Premod   []NMES []Other:  []w/US   []w/ice   []w/heat Position: Location:  
[]  Traction: [] Cervical       []Lumbar 
                     [] Prone          []Supine []Intermittent   []Continuous Lbs: 
[] before manual 
[] after manual 
[]w/heat  
[]  Ultrasound: []Continuous   [] Pulsed  
                    at: []1MHz   []3MHz Location: 
W/cm2:  
[] Paraffin Location:  
[]w/heat  
[]  Ice     [x]  Heat x 10 minutes  
[]  Ice massage Position: seated with back supported Location: back and shoulders at end of session   
[]  Laser 
[]  Other: Position: Location:  
[]  Vasopneumatic Device Pressure:       [] lo [] med [] hi  
Temperature:   
[x] Skin assessment post-treatment:  [x]intact []redness- no adverse reaction 
  []redness  adverse reaction:  
 
20 min Therapeutic Exercise:  [x] See flow sheet :  
 Rationale: increase ROM and increase strength to improve the patients ability to turn head and improve postural awareness 20 min Neuromuscular Re-education:  [x]  See flow sheet :  
Rationale: increase ROM, increase strength and improve coordination to improve concussion symptoms 15 min Manual Therapy: Gabriel Aldridge- Left Epley manuever Rationale: correct positional vertigo to improve the patients ability to perform functional activities with increased ease With 
 [] TE 
 [] TA 
 [] neuro 
 [] other: Patient Education: [x] Review HEP [] Progressed/Changed HEP based on:  
[] positioning   [] body mechanics   [] transfers   [] heat/ice application   
[] other:   
 
Other Objective/Functional Measures: --  
 
Pain Level (0-10 scale) post treatment: 0/10 ASSESSMENT/Changes in Function:  
Pt responded favorably to left Epley maneuver, with resolution of dizziness. Pt given handout with recommendations to keep head vertical and instruction of HEP Epley. Patient will continue to benefit from skilled PT services to modify and progress therapeutic interventions, address functional mobility deficits, address ROM deficits, address strength deficits, analyze and address soft tissue restrictions, analyze and cue movement patterns, analyze and modify body mechanics/ergonomics, assess and modify postural abnormalities and address imbalance/dizziness to attain remaining goals. []  See Plan of Care 
[]  See progress note/recertification 
[]  See Discharge Summary Progress towards goals / Updated goals: 
Short Term Goals: To be accomplished in 3 weeks: 
1) Pt will be independent with HEP. 2) Pt will be able to sit with upright posture >/= 5 minutes without increase of symptoms. 3) Pt will report improvement in cervical Rotation to look over shoulders while driving. 4) Perform VOR Cancellation in sitting for >/=30 seconds before onset of symptoms. 5) Balance on firm surface with eyes closed >/= 30 seconds before onset of symptoms. 
  
Long Term Goals: To be accomplished in 6 weeks: 
1) Pt will be able to read without increase of neck pain. 2) Pt will be able to look over shoulders while driving without increase of neck pain. 3) Pt will demonstrate ability to lift >/= 15 lbs without increase of symptoms. 4) Perform VOR Cancellation while standing without reproduction of symptoms. 5) Balance on soft surface with eyes closed >/= 30 seconds without symptoms. 6) Tolerate visual conflict while walking without symptoms. PLAN [x]  Upgrade activities as tolerated     [x]  Continue plan of care 
[]  Update interventions per flow sheet      
[]  Discharge due to:_ 
[]  Other:_   
 
Vidhi Beckwith, PT 1/17/2019

## 2019-01-22 ENCOUNTER — HOSPITAL ENCOUNTER (OUTPATIENT)
Dept: PHYSICAL THERAPY | Age: 59
Discharge: HOME | End: 2019-01-22
Payer: COMMERCIAL

## 2019-01-22 PROCEDURE — 97140 MANUAL THERAPY 1/> REGIONS: CPT

## 2019-01-22 PROCEDURE — 97110 THERAPEUTIC EXERCISES: CPT

## 2019-01-22 PROCEDURE — 97112 NEUROMUSCULAR REEDUCATION: CPT

## 2019-01-22 NOTE — PROGRESS NOTES
PT DAILY TREATMENT NOTE - [facility] 2-15 Patient Name: Zoey Sifuentes Date:2019 : 1960 [x]  Patient  Verified Payor: BLUE CROSS / Plan: [place] [place] [employer] / Product Type: PPO / In time:10:30am  Out time:11:40am 
Total Treatment Time (min): 70 Total Timed Codes (min): 70 
1:1 Treatment Time ( [address] only): 70 Visit #: 3 Treatment Area: Concussion [S06.0X9A] SUBJECTIVE Pain Level (0-10 scale): 0/10 Any medication changes, allergies to medications, adverse drug reactions, diagnosis change, or new procedure performed?: [x] No    [] Yes (see summary sheet for update) Subjective functional status/changes:   [] No changes reported Pt has been walking 2x/day. On , pt was outside walking and it was really windy. The wind threw her off balance and caused increase in dizziness. Pt has been trying to avoid positions that make her dizzy. OBJECTIVE 35 min Therapeutic Exercise:  - See flow sheet :  
Rationale: increase ROM, increase strength and improve coordination to improve the patients ability to perform functional activities with increased ease 25 min Neuromuscular Re-education:  [x]  See flow sheet :  
Rationale: improve coordination, improve balance and increase proprioception to improve concussion symptoms 10 min Manual Therapy: left Epley maneuver Rationale: correct positional vertigo to improve the patients ability to ambulate and perform functional activities with increased ease With 
 [] TE 
 [] TA 
 [] neuro 
 [] other: Patient Education: [x] Review HEP [] Progressed/Changed HEP based on:  
[] positioning   [] body mechanics   [] transfers   [] heat/ice application   
[] other:   
 
Other Objective/Functional Measures: --  
 
Pain Level (0-10 scale) post treatment: 0/10 ASSESSMENT/Changes in Function:  
Pt denied reproduction of concussion symptoms with therapeutic exercise and balance challenges.   Instructed pt to decrease range during visual exercises. Pt required re-instruction for proper performance of VOR cancellation exercise. Patient will continue to benefit from skilled PT services to modify and progress therapeutic interventions, address functional mobility deficits, address ROM deficits, address strength deficits, analyze and address soft tissue restrictions, analyze and cue movement patterns, analyze and modify body mechanics/ergonomics, assess and modify postural abnormalities and address imbalance/dizziness to attain remaining goals. []  See Plan of Care 
[]  See progress note/recertification 
[]  See Discharge Summary Progress towards goals / Updated goals: 
Short Term Goals: To be accomplished in 3 weeks: 
1) Pt will be independent with HEP. MET 
2) Pt will be able to sit with upright posture >/= 5 minutes without increase of symptoms. MET 
3) Pt will report improvement in cervical Rotation to look over shoulders while driving. MET 
4) Perform VOR Cancellation in sitting for >/=30 seconds before onset of symptoms. 5) Balance on firm surface with eyes closed >/= 30 seconds before onset of symptoms. 
  
Long Term Goals: To be accomplished in 6 weeks: 
1) Pt will be able to read without increase of neck pain. 2) Pt will be able to look over shoulders while driving without increase of neck pain. 3) Pt will demonstrate ability to lift >/= 15 lbs without increase of symptoms. 4) Perform VOR Cancellation while standing without reproduction of symptoms. 5) Balance on soft surface with eyes closed >/= 30 seconds without symptoms. 6) Tolerate visual conflict while walking without symptoms. PLAN [x]  Upgrade activities as tolerated     [x]  Continue plan of care 
[]  Update interventions per flow sheet      
[]  Discharge due to:_ 
[]  Other:_   
 
Renata Moeller, PT, DPT   1/22/2019

## 2019-01-24 ENCOUNTER — HOSPITAL ENCOUNTER (OUTPATIENT)
Dept: PHYSICAL THERAPY | Age: 59
Discharge: HOME | End: 2019-01-24
Payer: COMMERCIAL

## 2019-01-24 PROCEDURE — 97140 MANUAL THERAPY 1/> REGIONS: CPT

## 2019-01-24 PROCEDURE — 97112 NEUROMUSCULAR REEDUCATION: CPT

## 2019-01-24 NOTE — PROGRESS NOTES
PT DAILY TREATMENT NOTE - [facility] 2-15 Patient Name: Paula Alfonso Date:2019 : 1960 [x]  Patient  Verified Payor: BLUE CROSS / Plan: [place] [place] [employer] / Product Type: PPO / In time:12:25pm  Out time:1:10pm 
Total Treatment Time (min): 45 Total Timed Codes (min): 45 
1:1 Treatment Time ( [address] only): 30 Visit #: 4 Treatment Area: Concussion [S06.0X9A] SUBJECTIVE Pain Level (0-10 scale): 0/10 Any medication changes, allergies to medications, adverse drug reactions, diagnosis change, or new procedure performed?: [x] No    [] Yes (see summary sheet for update) Subjective functional status/changes:   [] No changes reported Pt denies HA or dizziness since last PT session. Pt states that she woke up this morning and felt \"clear\". OBJECTIVE 33 min Neuromuscular Re-education:  [x]  See flow sheet :  
Rationale: improve coordination, improve balance and increase proprioception to improve concussion symptoms 12 min Manual Therapy: left Epley maneuver Rationale: correct positional vertigo to improve the patients ability to ambulate and perform functional activities with increased ease With 
 [] TE 
 [] TA 
 [] neuro 
 [] other: Patient Education: [x] Review HEP [] Progressed/Changed HEP based on:  
[] positioning   [] body mechanics   [] transfers   [] heat/ice application   
[] other:   
 
Other Objective/Functional Measures: --  
 
Pain Level (0-10 scale) post treatment: 0/10 ASSESSMENT/Changes in Function:  
Significant improvement in pt's response to Epley maneuver. Pt demonstrated improved coordination and control during balance challenges. Reviewed Epley for HEP. Pt has met all short-term PT goals.   Patient will continue to benefit from skilled PT services to modify and progress therapeutic interventions, address functional mobility deficits, address ROM deficits, address strength deficits, analyze and address soft tissue restrictions, analyze and cue movement patterns, analyze and modify body mechanics/ergonomics, assess and modify postural abnormalities and address imbalance/dizziness to attain remaining goals. []  See Plan of Care 
[]  See progress note/recertification 
[]  See Discharge Summary Progress towards goals / Updated goals: 
Short Term Goals: To be accomplished in 3 weeks: 
1) Pt will be independent with HEP. MET 
2) Pt will be able to sit with upright posture >/= 5 minutes without increase of symptoms. MET 
3) Pt will report improvement in cervical Rotation to look over shoulders while driving. MET 
4) Perform VOR Cancellation in sitting for >/=30 seconds before onset of symptoms. MET 
5) Balance on firm surface with eyes closed >/= 30 seconds before onset of symptoms. MET 
  
Long Term Goals: To be accomplished in 6 weeks: 
1) Pt will be able to read without increase of neck pain. 2) Pt will be able to look over shoulders while driving without increase of neck pain. 3) Pt will demonstrate ability to lift >/= 15 lbs without increase of symptoms. 4) Perform VOR Cancellation while standing without reproduction of symptoms. 5) Balance on soft surface with eyes closed >/= 30 seconds without symptoms. 6) Tolerate visual conflict while walking without symptoms. PLAN [x]  Upgrade activities as tolerated     [x]  Continue plan of care 
[]  Update interventions per flow sheet      
[]  Discharge due to:_ 
[]  Other:_   
 
Shlomo Noel, PT, DPT   1/24/2019

## 2019-01-29 ENCOUNTER — HOSPITAL ENCOUNTER (OUTPATIENT)
Dept: PHYSICAL THERAPY | Age: 59
Discharge: HOME | End: 2019-01-29
Payer: COMMERCIAL

## 2019-01-29 ENCOUNTER — OFFICE VISIT (OUTPATIENT)
Dept: INTERNAL MEDICINE CLINIC | Age: 59
End: 2019-01-29

## 2019-01-29 VITALS
WEIGHT: 209.2 LBS | HEIGHT: 67 IN | TEMPERATURE: 98.6 F | RESPIRATION RATE: 16 BRPM | DIASTOLIC BLOOD PRESSURE: 87 MMHG | OXYGEN SATURATION: 95 % | BODY MASS INDEX: 32.83 KG/M2 | HEART RATE: 86 BPM | SYSTOLIC BLOOD PRESSURE: 143 MMHG

## 2019-01-29 DIAGNOSIS — S06.0X0D CONCUSSION WITHOUT LOSS OF CONSCIOUSNESS, SUBSEQUENT ENCOUNTER: Primary | ICD-10-CM

## 2019-01-29 DIAGNOSIS — S06.6X0A SUBARACHNOID HEMORRHAGE FOLLOWING INJURY, NO LOSS OF CONSCIOUSNESS, INITIAL ENCOUNTER (HCC): ICD-10-CM

## 2019-01-29 PROCEDURE — 97140 MANUAL THERAPY 1/> REGIONS: CPT

## 2019-01-29 PROCEDURE — 97110 THERAPEUTIC EXERCISES: CPT

## 2019-01-29 NOTE — PROGRESS NOTES
PT DAILY TREATMENT NOTE - [facility] 2-15 Patient Name: Regla Ask Date:2019 : 1960 [x]  Patient  Verified Payor: BLUE CROSS / Plan: [place] [employer] / Product Type: PPO / In time: 2:00pm  Out time: 3:00pm 
Total Treatment Time (min): 60 Total Timed Codes (min): 60 
1:1 Treatment Time ([facility] only): 60 Visit #: 2 Treatment Area: Concussion [S06.0X9A] SUBJECTIVE Pain Level (0-10 scale): 0/10 Any medication changes, allergies to medications, adverse drug reactions, diagnosis change, or new procedure performed?: [x] No    [] Yes (see summary sheet for update) Subjective functional status/changes:   [] No changes reported Pt had appointment with optometrist yesterday to check eyes, post-head injury, which were clear; however, pt woke up this morning with a severe HA, which she attributes to the bright lights from eyes being dilated and riding in the car to and from [place] for the appointment. Pt had follow-up with Dr. Dipti Doran prior to today's PT session; recommends continued PT and plans to do repeat head CT next visit (in about 3 weeks) if symptoms persist.  Pt complains of \"fullness\" in bilateral ears and feels \"off\" today. OBJECTIVE 10 min Therapeutic Exercise:  [x] See flow sheet :  
Rationale: increase strength and improve coordination to improve the patients ability to return to PLOF 50 min Manual Therapy: liberatory maneuver; STM bilateral UTs followed by passive stretching, STM cervical paraspinals; gentle cervical traction Rationale: correct positional vertigo to improve the patients ability to ambulate and perform functional activities with increased ease With 
 [] TE 
 [] TA 
 [] neuro 
 [] other: Patient Education: [x] Review HEP [] Progressed/Changed HEP based on:  
[] positioning   [] body mechanics   [] transfers   [] heat/ice application   
[] other:   
 
Other Objective/Functional Measures: --  
 
Pain Level (0-10 scale) post treatment: 0/10 ASSESSMENT/Changes in Function:  
Pt responded favorably to liberatory maneuver and reported resolution of \"fullness\" in bilateral ears at end of session. Recommend pt sleep inclined tonight. Patient will continue to benefit from skilled PT services to modify and progress therapeutic interventions, address functional mobility deficits, address ROM deficits, address strength deficits, analyze and address soft tissue restrictions, analyze and cue movement patterns, analyze and modify body mechanics/ergonomics, assess and modify postural abnormalities and address imbalance/dizziness to attain remaining goals. []  See Plan of Care 
[]  See progress note/recertification 
[]  See Discharge Summary Progress towards goals / Updated goals: 
Short Term Goals: To be accomplished in 3 weeks: 
1) Pt will be independent with HEP. MET 
2) Pt will be able to sit with upright posture >/= 5 minutes without increase of symptoms. MET 
3) Pt will report improvement in cervical Rotation to look over shoulders while driving. MET 
4) Perform VOR Cancellation in sitting for >/=30 seconds before onset of symptoms. MET 
5) Balance on firm surface with eyes closed >/= 30 seconds before onset of symptoms. MET 
  
Long Term Goals: To be accomplished in 6 weeks: 
1) Pt will be able to read without increase of neck pain. 2) Pt will be able to look over shoulders while driving without increase of neck pain. 3) Pt will demonstrate ability to lift >/= 15 lbs without increase of symptoms. 4) Perform VOR Cancellation while standing without reproduction of symptoms. 5) Balance on soft surface with eyes closed >/= 30 seconds without symptoms. 6) Tolerate visual conflict while walking without symptoms. PLAN [x]  Upgrade activities as tolerated     [x]  Continue plan of care 
[]  Update interventions per flow sheet      
[]  Discharge due to:_ 
[]  Other:_   
 
Carly Cardona, PT, DPT   1/29/2019

## 2019-01-29 NOTE — PROGRESS NOTES
Chief Complaint Patient presents with  Concussion HPI:  she is a 60y.o. year old female who presents for follow up of concussion Patient states she is doing better but still having some inner ear problems and feeling spacey. Concussion Clinic - Initial Evaluation Referring Provider: Shayla Zuniga Date of Injury: 1/10/19 Mechanism of Injury: ice skating and landed on the back of her head Removed from play?:Not applicable Amnesia: 
     Retrograde 0 minutes Anterograde 0 minutes Red Flags: 
Worsening headaches - Yes Severe neck pain - Yes Very sleepy - No 
Can't recognize people or places  - No 
Deteriorating consciousness  - No 
Increasing confusion or irritability - No 
Worsening vomiting  - No 
Slurred speech  - No 
Focal neurological signs - No 
Weakness/numbness in limbs  - No 
Severe behavior change - No 
Seizures (after the initial event) - No 
 
 
Initial Management: 
     Physical exertion: Yes 
     School attendance: Not applicable Cognitive rest: Yes Imaging: Yes Previous Concussions (include dates, duration and any treatments): Yes Any increasing concussability:No 
 
Have subsequent concussions been more severe:No 
 
Developmental History: 
     Learning disability: No 
     ADHD: no Other developmental abnormalities No 
 
Medical history that may modify recovery: 
     Headaches: No 
     Migraine Headaches: No 
     Epilepsy: No 
     Thyroid disease: No 
     History of CNS infection: No 
 
Psychiatric history that may modify recovery: Anxiety: Yes Depression: Yes Sleep disorder: No 
 
Reviewed and agree with Nurse Note and duplicated in this note. Reviewed PmHx, RxHx, FmHx, SocHx, AllgHx and updated and dated in the chart. Family History Problem Relation Age of Onset  Macular Degen Mother  Hypertension Father Past Medical History:  
Diagnosis Date  Hypertension Social History Socioeconomic History  Marital status:  Spouse name: Not on file  Number of children: Not on file  Years of education: Not on file  Highest education level: Not on file Tobacco Use  Smoking status: Never Smoker  Smokeless tobacco: Never Used Substance and Sexual Activity  Alcohol use: Yes Comment: 2 drinks per week  Drug use: No  
 Sexual activity: Yes  
  Partners: Female Birth control/protection: None Review of Systems - negative except as listed above Objective: There were no vitals filed for this visit. Physical Examination: General appearance - alert, well appearing, and in no distress Eyes - right pupil/eye old injury, left pupil equal and reactive, extraocular eye movements intact Ears - bilateral TM's and external ear canals normal 
Nose - normal and patent, no erythema, discharge or polyps Mouth - mucous membranes moist, pharynx normal without lesions Neck - supple, no significant adenopathy Back exam - full range of motion, no tenderness, palpable spasm or pain on motion NEUROLOGIC:   
 Cranial nerves II  XII: Intact Speech: Normal.   
 Face: Symmetrical 
 Extremities: Moving all equally, well perfused, and no edema. DTR: WNL, equal and symmetric Strength and sensation: Grossly intact. Gait: Normal 
  
Able to perform 3 word recall at 1 min and 5 min. Able to spell WORLD frontwards and backwards. Serial 7s intact. Long term memory intact (remembers phone number, address, friends names). Vestibular-Ocular Screening: 
Ocular-Motor: 
 Smooth Pursuits (\"H-Test\"):  Negative Saccades (Vertical/Horizontal):  Negative Convergence (<6cm):  Negative Accomodation (<6cm):  Positive Vestibular-Ocular: 
 Gaze Stability: (Vertical/Horizontal): Negative VOR cancellation:  Positive Balance Examination: 
 Romberg, compliant Foam 
   Eyes Open:  Positive Eyes Closed:  Positive SCAT3 Scores -  
 
 Date  Symptom Score 1/15/19 32 Comprehensive evaluation, administration and interpretation of SCAT3/Impact Neuro Psych testing completed at office visit today. Results scanned into chart. 1. Brief discussion with  10 minutes 2. Interview & brief neurological  
and balance exam with athlete 40 minutes 3. Brief interview with parent (if a minor) 15 minutes 4. ImPACT testing (patient alone) 30 minutes 5. Review results and discuss concussion  
and return to play with athlete and parent 20 minutes 6. Brief report (dictated) 20 minutes 7. Feedback with ATC next day 15 minutes 8. Feedback with parent two days later 15 minutes Spent 60min face-to-face, >50% spent on counseling & patient education. Assessment/ Plan:  
Diagnoses and all orders for this visit: 1. Concussion without loss of consciousness, subsequent encounter 2. Subarachnoid hemorrhage following injury, no loss of consciousness, initial encounter ([facility] [address] Continue with physical therapy at [facility] and on Epley maneuvers Normal eye exam 
Consider repeat scan if symptoms are not much better by next visit Follow-up Disposition: 
Return if symptoms worsen or fail to improve. 1. Rest.  No sports or exercise until cleared. 2. Concussions typically results in the rapid onset of short-lived impairment that resolves spontaneously over time (usually within 1 week - 1 month). 3.  Vestibular Rehab Referral - Eval/Therapy  yes Signs to watch for: 
Problems could arise over the first 24-48 hours. You should not be left alone and must go to a hospital at once it you: 1. Have a headache that gets worse. 2. Are very drowsy or cant be awakened (woken up). 3. Cant recognize people or places. 4. Have repeated vomiting 5. Behave unusually or seem confused; are very irritable. 6. Have seizures (arms and legs jerk uncontrollably). 7. Are unsteady on your feet; have slurred speech; vision or hearing changes. Return to Play: Handout for 5 stage RTP given and explained to patient will hold from activities/sports for 7 days. A structured, graded exertion protocol should be developed; individualized on the basis of sport, age and the concussion history of the athlete. Exercise or training should be commenced only after the athlete is clearly asymptomatic with physical and cognitive rest. Final decision for clearance to return to competition should ideally be made by a medical doctor. When returning athletes to play, they should follow a stepwise symptom-limited program, with stages of progression. For example: 1. rest until asymptomatic (physical and mental rest) 2. light aerobic exercise (e.g. stationary cycle) 3. sport-specific exercise 4. non-contact training drills (start light resistance training) 5. full contact training after medical clearance 6. return to competition (game play) There should be approximately 24 hours (or longer) for each stage and the athlete should return to stage 1 if symptoms recur. Resistance training should only be added in the later stages. Medical clearance should be given before return to play. Follow up in 14 Days, will consider Impact/Scat3 test at next visit I have discussed the diagnosis with the patient and the intended plan as seen in the above orders. The patient has received an after-visit summary and questions were answered concerning future plans. Medication Side Effects and Warnings were discussed with patient, Patient Labs were reviewed and or requested, and 
Patient Past Records were reviewed and or requested  yes Pt agrees to call or return to clinic and/or go to closest ER with any worsening of symptoms. This may include, but not limited to increased fever (>100.4) with NSAIDS or Tylenol, increased edema, confusion, rash, worsening of presenting symptoms.

## 2019-01-31 ENCOUNTER — HOSPITAL ENCOUNTER (OUTPATIENT)
Dept: PHYSICAL THERAPY | Age: 59
Discharge: HOME | End: 2019-01-31
Payer: COMMERCIAL

## 2019-01-31 PROCEDURE — 97140 MANUAL THERAPY 1/> REGIONS: CPT

## 2019-01-31 PROCEDURE — 97112 NEUROMUSCULAR REEDUCATION: CPT

## 2019-01-31 NOTE — PROGRESS NOTES
PT DAILY TREATMENT NOTE - [facility] 2-15 Patient Name: Regla Ask Date:2019 : 1960 [x]  Patient  Verified Payor: BLUE CROSS / Plan: [place] [employer] / Product Type: PPO / In time: 2:00pm  Out time: 3:00pm 
Total Treatment Time (min): 60 Total Timed Codes (min): 60 
1:1 Treatment Time ([facility] only): 60 Visit #: 9 Treatment Area: Concussion [S06.0X9A] SUBJECTIVE Pain Level (0-10 scale): 0/10 Any medication changes, allergies to medications, adverse drug reactions, diagnosis change, or new procedure performed?: [x] No    [] Yes (see summary sheet for update) Subjective functional status/changes:   [] No changes reported Pt reports no return of \"fullness\" in ears since last PT session. OBJECTIVE 10 min Therapeutic Exercise:  [x] See flow sheet :  
Rationale: increase strength and improve coordination to improve the patients ability to return to PLOF 50 min Manual Therapy: STM bilateral UTs followed by passive stretching, STM cervical paraspinals; gentle cervical traction Rationale: correct positional vertigo to improve the patients ability to ambulate and perform functional activities with increased ease With 
 [] TE 
 [] TA 
 [] neuro 
 [] other: Patient Education: [x] Review HEP [] Progressed/Changed HEP based on:  
[] positioning   [] body mechanics   [] transfers   [] heat/ice application   
[] other:   
 
Other Objective/Functional Measures: --  
 
Pain Level (0-10 scale) post treatment: 0/10 ASSESSMENT/Changes in Function:  
Pt reported dizziness when transitioning sit to standing too quickly.   Patient will continue to benefit from skilled PT services to modify and progress therapeutic interventions, address functional mobility deficits, address ROM deficits, address strength deficits, analyze and address soft tissue restrictions, analyze and cue movement patterns, analyze and modify body mechanics/ergonomics, assess and modify postural abnormalities and address imbalance/dizziness to attain remaining goals. []  See Plan of Care 
[]  See progress note/recertification 
[]  See Discharge Summary Progress towards goals / Updated goals: 
Short Term Goals: To be accomplished in 3 weeks: 
1) Pt will be independent with HEP. MET 
2) Pt will be able to sit with upright posture >/= 5 minutes without increase of symptoms. MET 
3) Pt will report improvement in cervical Rotation to look over shoulders while driving. MET 
4) Perform VOR Cancellation in sitting for >/=30 seconds before onset of symptoms. MET 
5) Balance on firm surface with eyes closed >/= 30 seconds before onset of symptoms. MET 
  
Long Term Goals: To be accomplished in 6 weeks: 
1) Pt will be able to read without increase of neck pain. MET 
2) Pt will be able to look over shoulders while driving without increase of neck pain. MET 
3) Pt will demonstrate ability to lift >/= 15 lbs without increase of symptoms. 4) Perform VOR Cancellation while standing without reproduction of symptoms. 5) Balance on soft surface with eyes closed >/= 30 seconds without symptoms. 6) Tolerate visual conflict while walking without symptoms. PLAN [x]  Upgrade activities as tolerated     [x]  Continue plan of care 
[]  Update interventions per flow sheet      
[]  Discharge due to:_ 
[]  Other:_   
 
Josh Oakley, PT, DPT   1/31/2019

## 2019-02-05 ENCOUNTER — HOSPITAL ENCOUNTER (OUTPATIENT)
Dept: PHYSICAL THERAPY | Age: 59
Discharge: HOME | End: 2019-02-05
Payer: COMMERCIAL

## 2019-02-05 PROCEDURE — 97140 MANUAL THERAPY 1/> REGIONS: CPT

## 2019-02-05 PROCEDURE — 97112 NEUROMUSCULAR REEDUCATION: CPT

## 2019-02-05 NOTE — PROGRESS NOTES
PT DAILY TREATMENT NOTE - [facility] 2-15 Patient Name: Chito Mealing Date:2019 : 1960 [x]  Patient  Verified Payor: BLUE CROSS / [place]: [place] [employer] / Product Type: PPO / In time: 2:00pm  Out time: 3:00pm 
Total Treatment Time (min): 60 Total Timed Codes (min): 60 
1:1 Treatment Time ( [address] only): 60 Visit #: 7 Treatment Area: Concussion [S06.0X9A] SUBJECTIVE Pain Level (0-10 scale): 0/10 Any medication changes, allergies to medications, adverse drug reactions, diagnosis change, or new procedure performed?: [x] No    [] Yes (see summary sheet for update) Subjective functional status/changes:   [] No changes reported Pt reports have 2 \"episodes\" of dizziness since last PT session, where pt was not able to walk for fear of falling. Pt has not been driving and is starting to feel depressed about functional/recreational limitations. Pt continues to complain of HA. OBJECTIVE 15 min Neuromuscular Re-education:  [x]  See flow sheet :  
Rationale: increase ROM, increase strength, improve coordination and improve balance  to improve the patients ability to perform functional activities 45 min Manual Therapy: Liberatory maneuver for left posterior canal repositioning- provided education to pt and patient's partner on how to perform liberatory maneuver at home Rationale: correct positional vertigo to improve the patients ability to ambulate and perform functional activities with increased ease With 
 [] TE 
 [] TA 
 [] neuro 
 [] other: Patient Education: [x] Review HEP [] Progressed/Changed HEP based on:  
[] positioning   [] body mechanics   [] transfers   [] heat/ice application   
[] other:   
 
Other Objective/Functional Measures: --  
 
Pain Level (0-10 scale) post treatment: 0/10 ASSESSMENT/Changes in Function:  
Pt able to perform gaze stabilization, saccades and smooth pursuits without reproduction of concussion symptoms.   Pt reported dizziness during Liberatory maneuver, which resolved by the completion of sequencing. Patient will continue to benefit from skilled PT services to modify and progress therapeutic interventions, address functional mobility deficits, address ROM deficits, address strength deficits, analyze and address soft tissue restrictions, analyze and cue movement patterns, analyze and modify body mechanics/ergonomics, assess and modify postural abnormalities and address imbalance/dizziness to attain remaining goals. []  See Plan of Care 
[]  See progress note/recertification 
[]  See Discharge Summary Progress towards goals / Updated goals: 
Short Term Goals: To be accomplished in 3 weeks: 
1) Pt will be independent with HEP. MET 
2) Pt will be able to sit with upright posture >/= 5 minutes without increase of symptoms. MET 
3) Pt will report improvement in cervical Rotation to look over shoulders while driving. MET 
4) Perform VOR Cancellation in sitting for >/=30 seconds before onset of symptoms. MET 
5) Balance on firm surface with eyes closed >/= 30 seconds before onset of symptoms. MET 
  
Long Term Goals: To be accomplished in 6 weeks: 
1) Pt will be able to read without increase of neck pain. MET 
2) Pt will be able to look over shoulders while driving without increase of neck pain. MET 
3) Pt will demonstrate ability to lift >/= 15 lbs without increase of symptoms. 4) Perform VOR Cancellation while standing without reproduction of symptoms. 5) Balance on soft surface with eyes closed >/= 30 seconds without symptoms. 6) Tolerate visual conflict while walking without symptoms. PLAN [x]  Upgrade activities as tolerated     [x]  Continue plan of care 
[]  Update interventions per flow sheet      
[]  Discharge due to:_ 
[]  Other:_   
 
Peyman Mckeon, PT, DPT   2/5/2019

## 2019-02-07 ENCOUNTER — HOSPITAL ENCOUNTER (OUTPATIENT)
Dept: PHYSICAL THERAPY | Age: 59
Discharge: HOME | End: 2019-02-07
Payer: COMMERCIAL

## 2019-02-07 PROCEDURE — 97110 THERAPEUTIC EXERCISES: CPT

## 2019-02-07 PROCEDURE — 97140 MANUAL THERAPY 1/> REGIONS: CPT

## 2019-02-07 NOTE — PROGRESS NOTES
PT DAILY TREATMENT NOTE - [facility] 2-15 Patient Name: Halley Orozco Date:2019 : 1960 [x]  Patient  Verified Payor: BLUE CROSS / [place]: [place] [employer] / Product Type: PPO / In time: 1:00pm  Out time: 2:00pm 
Total Treatment Time (min): 60 Total Timed Codes (min): 60 
1:1 Treatment Time ( [address] only): 60 Visit #: 6 Treatment Area: Concussion [S06.0X9A] SUBJECTIVE Pain Level (0-10 scale): 0/10 Any medication changes, allergies to medications, adverse drug reactions, diagnosis change, or new procedure performed?: [x] No    [] Yes (see summary sheet for update) Subjective functional status/changes:   [] No changes reported Pt is pleased to report no episodes of dizziness or HA since last PT session. Pt tried liberatory maneuver for right side at home and had no reproduction of symptoms. OBJECTIVE 15 min Therapeutic exercise:  [x]  See flow sheet :  
Rationale: increase ROM, increase strength, improve coordination and improve balance  to improve the patients ability to perform functional activities 45 min Manual Therapy: Liberatory maneuver for left posterior canal repositioning Rationale: correct positional vertigo to improve the patients ability to ambulate and perform functional activities with increased ease With 
 [] TE 
 [] TA 
 [] neuro 
 [] other: Patient Education: [x] Review HEP [] Progressed/Changed HEP based on:  
[] positioning   [] body mechanics   [] transfers   [] heat/ice application   
[] other:   
 
Other Objective/Functional Measures: --  
 
Pain Level (0-10 scale) post treatment: 0/10 ASSESSMENT/Changes in Function:  
Pt dizziness during Liberatory maneuver on the left, which resolved quickly.   Patient will continue to benefit from skilled PT services to modify and progress therapeutic interventions, address functional mobility deficits, address ROM deficits, address strength deficits, analyze and address soft tissue restrictions, analyze and cue movement patterns, analyze and modify body mechanics/ergonomics, assess and modify postural abnormalities and address imbalance/dizziness to attain remaining goals. []  See Plan of Care 
[]  See progress note/recertification 
[]  See Discharge Summary Progress towards goals / Updated goals: 
Short Term Goals: To be accomplished in 3 weeks: 
1) Pt will be independent with HEP. MET 
2) Pt will be able to sit with upright posture >/= 5 minutes without increase of symptoms. MET 
3) Pt will report improvement in cervical Rotation to look over shoulders while driving. MET 
4) Perform VOR Cancellation in sitting for >/=30 seconds before onset of symptoms. MET 
5) Balance on firm surface with eyes closed >/= 30 seconds before onset of symptoms. MET 
  
Long Term Goals: To be accomplished in 6 weeks: 
1) Pt will be able to read without increase of neck pain. MET 
2) Pt will be able to look over shoulders while driving without increase of neck pain. MET 
3) Pt will demonstrate ability to lift >/= 15 lbs without increase of symptoms. 4) Perform VOR Cancellation while standing without reproduction of symptoms. 5) Balance on soft surface with eyes closed >/= 30 seconds without symptoms. 6) Tolerate visual conflict while walking without symptoms. PLAN [x]  Upgrade activities as tolerated     [x]  Continue plan of care 
[]  Update interventions per flow sheet      
[]  Discharge due to:_ 
[]  Other:_   
 
Luci Jamil, PT, DPT   2/7/2019

## 2019-02-12 ENCOUNTER — HOSPITAL ENCOUNTER (OUTPATIENT)
Dept: PHYSICAL THERAPY | Age: 59
Discharge: HOME | End: 2019-02-12
Payer: COMMERCIAL

## 2019-02-12 PROCEDURE — 97110 THERAPEUTIC EXERCISES: CPT

## 2019-02-12 PROCEDURE — 97112 NEUROMUSCULAR REEDUCATION: CPT

## 2019-02-12 NOTE — PROGRESS NOTES
PT DAILY TREATMENT NOTE - [facility] 2-15 Patient Name: Kalen Bai Date:2019 : 1960 [x]  Patient  Verified Payor: BLUE CROSS / [place]: [place] [employer] / Product Type: PPO / In time: 1:05pm  Out time: 1:45pm 
Total Treatment Time (min): 40 Total Timed Codes (min): 40 
1:1 Treatment Time ( only): 40 Visit #: 2 Treatment Area: Concussion [S06.0X9A] SUBJECTIVE Pain Level (0-10 scale): 0/10 Any medication changes, allergies to medications, adverse drug reactions, diagnosis change, or new procedure performed?: [x] No    [] Yes (see summary sheet for update) Subjective functional status/changes:   [] No changes reported Pt is pleased to report no episodes of dizziness or HA since last PT session. Pt tried liberatory maneuver for right side at home and had no reproduction of symptoms. Pt complains of \"cresent light\" in periphery of left eye intermittently. Pt is a little concerned because she did not have this prior to her head injury and because she relies heavily on left eye due to right eye injury several years ago. Pt recently had appointment with ophthalmologist, who ruled out damage to retina. OBJECTIVE 30 min Therapeutic exercise:  [x]  See flow sheet : supervised pt perform liberatory maneuver to treat left posterior canal  
Rationale: increase ROM, increase strength, improve coordination and improve balance  to improve the patients ability to perform functional activities 10 min Neuromuscular Re-education:  [x]  See flow sheet :  
Rationale: improve coordination, improve balance and increase proprioception  to improve the patients ability to perform functional and recreational activities With 
 [] TE 
 [] TA 
 [] neuro 
 [] other: Patient Education: [x] Review HEP [] Progressed/Changed HEP based on:  
[] positioning   [] body mechanics   [] transfers   [] heat/ice application   
[] other:   
 
Other Objective/Functional Measures: --  
 
 Pain Level (0-10 scale) post treatment: 0/10 ASSESSMENT/Changes in Function:  
Pt instructed to inform PCP of flash of light that occurs in left eye, on next follow up visit (next week). Pt able to perform all visual exercises without reproduction of concussion symptoms. Patient will continue to benefit from skilled PT services to modify and progress therapeutic interventions, address functional mobility deficits, address ROM deficits, address strength deficits, analyze and address soft tissue restrictions, analyze and cue movement patterns, analyze and modify body mechanics/ergonomics, assess and modify postural abnormalities and address imbalance/dizziness to attain remaining goals. []  See Plan of Care 
[]  See progress note/recertification 
[]  See Discharge Summary Progress towards goals / Updated goals: 
Short Term Goals: To be accomplished in 3 weeks: 
1) Pt will be independent with HEP. MET 
2) Pt will be able to sit with upright posture >/= 5 minutes without increase of symptoms. MET 
3) Pt will report improvement in cervical Rotation to look over shoulders while driving. MET 
4) Perform VOR Cancellation in sitting for >/=30 seconds before onset of symptoms. MET 
5) Balance on firm surface with eyes closed >/= 30 seconds before onset of symptoms. MET 
  
Long Term Goals: To be accomplished in 6 weeks: 
1) Pt will be able to read without increase of neck pain. MET 
2) Pt will be able to look over shoulders while driving without increase of neck pain. MET 
3) Pt will demonstrate ability to lift >/= 15 lbs without increase of symptoms. 4) Perform VOR Cancellation while standing without reproduction of symptoms. 5) Balance on soft surface with eyes closed >/= 30 seconds without symptoms. 6) Tolerate visual conflict while walking without symptoms. PLAN [x]  Upgrade activities as tolerated     [x]  Continue plan of care 
[]  Update interventions per flow sheet      
[]  Discharge due to:_ 
 []  Other:_   
 
Lucho Sen, PT, DPT   2/12/2019

## 2019-02-14 ENCOUNTER — HOSPITAL ENCOUNTER (OUTPATIENT)
Dept: PHYSICAL THERAPY | Age: 59
Discharge: HOME | End: 2019-02-14
Payer: COMMERCIAL

## 2019-02-14 PROCEDURE — 97110 THERAPEUTIC EXERCISES: CPT

## 2019-02-14 PROCEDURE — 97112 NEUROMUSCULAR REEDUCATION: CPT

## 2019-02-14 NOTE — PROGRESS NOTES
\PT DAILY TREATMENT NOTE - [facility] 2-15 Patient Name: Olivia Howard Date:2019 : 1960 [x]  Patient  Verified Payor: BLUE CROSS / [place]: [place] [employer] / Product Type: PPO / In time: 1:00pm  Out time: 1:45pm 
Total Treatment Time (min): 45 Total Timed Codes (min): 45 
1:1 Treatment Time ( [address] only): 30 Visit #: 4 Treatment Area: Concussion [S06.0X9A] SUBJECTIVE Pain Level (0-10 scale): 0/10 Any medication changes, allergies to medications, adverse drug reactions, diagnosis change, or new procedure performed?: [x] No    [] Yes (see summary sheet for update) Subjective functional status/changes:   [] No changes reported Pt is pleased with management of dizziness- no episodes since last PT session. OBJECTIVE 25 min Therapeutic exercise:  [x]  See flow sheet : supervised pt perform liberatory maneuver to treat left posterior canal  
Rationale: increase ROM, increase strength, improve coordination and improve balance  to improve the patients ability to perform functional activities 20 min Neuromuscular Re-education:  [x]  See flow sheet :  
Rationale: improve coordination, improve balance and increase proprioception  to improve the patients ability to perform functional and recreational activities With 
 [] TE 
 [] TA 
 [] neuro 
 [] other: Patient Education: [x] Review HEP [] Progressed/Changed HEP based on:  
[] positioning   [] body mechanics   [] transfers   [] heat/ice application   
[] other:   
 
Other Objective/Functional Measures: --  
 
Pain Level (0-10 scale) post treatment: 0/10 ASSESSMENT/Changes in Function: It is no longer recommended that pt perform canalith repositioning as part of HEP. Pt progressing with balance challenges.   Patient will continue to benefit from skilled PT services to modify and progress therapeutic interventions, address functional mobility deficits, address ROM deficits, address strength deficits, analyze and address soft tissue restrictions, analyze and cue movement patterns, analyze and modify body mechanics/ergonomics, assess and modify postural abnormalities and address imbalance/dizziness to attain remaining goals. []  See Plan of Care 
[]  See progress note/recertification 
[]  See Discharge Summary Progress towards goals / Updated goals: 
Short Term Goals: To be accomplished in 3 weeks: 
1) Pt will be independent with HEP. MET 
2) Pt will be able to sit with upright posture >/= 5 minutes without increase of symptoms. MET 
3) Pt will report improvement in cervical Rotation to look over shoulders while driving. MET 
4) Perform VOR Cancellation in sitting for >/=30 seconds before onset of symptoms. MET 
5) Balance on firm surface with eyes closed >/= 30 seconds before onset of symptoms. MET 
  
Long Term Goals: To be accomplished in 6 weeks: 
1) Pt will be able to read without increase of neck pain. MET 
2) Pt will be able to look over shoulders while driving without increase of neck pain. MET 
3) Pt will demonstrate ability to lift >/= 15 lbs without increase of symptoms. 4) Perform VOR Cancellation while standing without reproduction of symptoms. 5) Balance on soft surface with eyes closed >/= 30 seconds without symptoms. 6) Tolerate visual conflict while walking without symptoms. PLAN [x]  Upgrade activities as tolerated     [x]  Continue plan of care 
[]  Update interventions per flow sheet      
[]  Discharge due to:_ 
[]  Other:_   
 
Meg Loza, PT, DPT   2/14/2019

## 2019-02-19 ENCOUNTER — HOSPITAL ENCOUNTER (OUTPATIENT)
Dept: PHYSICAL THERAPY | Age: 59
Discharge: HOME | End: 2019-02-19
Payer: COMMERCIAL

## 2019-02-19 ENCOUNTER — OFFICE VISIT (OUTPATIENT)
Dept: INTERNAL MEDICINE CLINIC | Age: 59
End: 2019-02-19

## 2019-02-19 VITALS
HEIGHT: 67 IN | OXYGEN SATURATION: 95 % | HEART RATE: 77 BPM | SYSTOLIC BLOOD PRESSURE: 125 MMHG | BODY MASS INDEX: 32.96 KG/M2 | WEIGHT: 210 LBS | DIASTOLIC BLOOD PRESSURE: 85 MMHG

## 2019-02-19 DIAGNOSIS — H53.10 SUBJECTIVE VISION DISTURBANCE, LEFT EYE: ICD-10-CM

## 2019-02-19 DIAGNOSIS — S06.0X0D CONCUSSION WITHOUT LOSS OF CONSCIOUSNESS, SUBSEQUENT ENCOUNTER: ICD-10-CM

## 2019-02-19 DIAGNOSIS — S06.6X0A SUBARACHNOID HEMORRHAGE FOLLOWING INJURY, NO LOSS OF CONSCIOUSNESS, INITIAL ENCOUNTER (HCC): Primary | ICD-10-CM

## 2019-02-19 PROCEDURE — 97112 NEUROMUSCULAR REEDUCATION: CPT

## 2019-02-19 PROCEDURE — 97110 THERAPEUTIC EXERCISES: CPT

## 2019-02-19 NOTE — PROGRESS NOTES
No chief complaint on file. HPI: pt presents for follow up concussion. Pt c/o dizziness, headaches, and flashing arcs of light. Head pain rated 3/10 at its worse. Concussion Clinic - Initial Evaluation Referring Provider: Beverly Kwok Date of Injury: 1/10/19 Mechanism of Injury: ice skating and landed on the back of her head Removed from play?:Not applicable Amnesia: 
     Retrograde 0 minutes Anterograde 0 minutes Red Flags: 
Worsening headaches - Yes Severe neck pain - Yes Very sleepy - No 
Can't recognize people or places  - No 
Deteriorating consciousness  - No 
Increasing confusion or irritability - No 
Worsening vomiting  - No 
Slurred speech  - No 
Focal neurological signs - No 
Weakness/numbness in limbs  - No 
Severe behavior change - No 
Seizures (after the initial event) - No 
 
 
Initial Management: 
     Physical exertion: Yes 
     School attendance: Not applicable Cognitive rest: Yes Imaging: Yes Previous Concussions (include dates, duration and any treatments): Yes Any increasing concussability:No 
 
Have subsequent concussions been more severe:No 
 
Developmental History: 
     Learning disability: No 
     ADHD: no Other developmental abnormalities No 
 
Medical history that may modify recovery: 
     Headaches: No 
     Migraine Headaches: No 
     Epilepsy: No 
     Thyroid disease: No 
     History of CNS infection: No 
 
Psychiatric history that may modify recovery: Anxiety: Yes Depression: Yes Sleep disorder: No 
 
Reviewed and agree with Nurse Note and duplicated in this note. Reviewed PmHx, RxHx, FmHx, SocHx, AllgHx and updated and dated in the chart. Family History Problem Relation Age of Onset  Macular Degen Mother  Hypertension Father Past Medical History:  
Diagnosis Date  Hypertension Social History Socioeconomic History  Marital status:  Spouse name: Not on file  Number of children: Not on file  Years of education: Not on file  Highest education level: Not on file Tobacco Use  Smoking status: Never Smoker  Smokeless tobacco: Never Used Substance and Sexual Activity  Alcohol use: Yes Comment: 2 drinks per week  Drug use: No  
 Sexual activity: Yes  
  Partners: Female Birth control/protection: None Review of Systems - negative except as listed above Objective:  
 
Vitals:  
 02/19/19 1352 Weight: 95.3 kg (210 lb) Height: 5' 7\" (1.702 m) Physical Examination: General appearance - alert, well appearing, and in no distress Eyes - right pupil/eye old injury, left pupil equal and reactive, extraocular eye movements intact Ears - bilateral TM's and external ear canals normal 
Nose - normal and patent, no erythema, discharge or polyps Mouth - mucous membranes moist, pharynx normal without lesions Neck - supple, no significant adenopathy Back exam - full range of motion, no tenderness, palpable spasm or pain on motion NEUROLOGIC:   
 Cranial nerves II  XII: Intact Speech: Normal.   
 Face: Symmetrical 
 Extremities: Moving all equally, well perfused, and no edema. DTR: WNL, equal and symmetric Strength and sensation: Grossly intact. Gait: Normal 
  
Able to perform 3 word recall at 1 min and 5 min. Able to spell WORLD frontwards and backwards. Serial 7s intact. Long term memory intact (remembers phone number, address, friends names). Vestibular-Ocular Screening: 
Ocular-Motor: 
 Smooth Pursuits (\"H-Test\"):  Negative Saccades (Vertical/Horizontal):  Negative Convergence (<6cm):  Negative Accomodation (<6cm):  Positive Vestibular-Ocular: 
 Gaze Stability: (Vertical/Horizontal): Negative VOR cancellation:  Positive Balance Examination: 
 Romberg, compliant Foam 
   Eyes Open:  Positive Eyes Closed:  Positive SCAT3 Scores - Date  Symptom Score 1/15/19 32 Comprehensive evaluation, administration and interpretation of SCAT3/Impact Neuro Psych testing completed at office visit today. Results scanned into chart. 1. Brief discussion with  10 minutes 2. Interview & brief neurological  
and balance exam with athlete 40 minutes 3. Brief interview with parent (if a minor) 15 minutes 4. ImPACT testing (patient alone) 30 minutes 5. Review results and discuss concussion  
and return to play with athlete and parent 20 minutes 6. Brief report (dictated) 20 minutes 7. Feedback with ATC next day 15 minutes 8. Feedback with parent two days later 15 minutes Spent 60min face-to-face, >50% spent on counseling & patient education. Assessment/ Plan:  
Diagnoses and all orders for this visit: 1. Subarachnoid hemorrhage following injury, no loss of consciousness, initial encounter ([facility] [address] -     CT HEAD WO CONT; Future 
-     REFERRAL TO OPHTHALMOLOGY 
-     REFERRAL TO NEUROLOGY 2. Concussion without loss of consciousness, subsequent encounter 
-     REFERRAL TO NEUROLOGY 3. Subjective vision disturbance, left eye 
-     REFERRAL TO OPHTHALMOLOGY 
-     REFERRAL TO NEUROLOGY Continue with physical therapy at [facility] and on Epley maneuvers Normal eye exam 
Consider repeat scan if symptoms are not much better by next visit Follow-up Disposition: 
Return for concussion. 1. Rest.  No sports or exercise until cleared. 2. Concussions typically results in the rapid onset of short-lived impairment that resolves spontaneously over time (usually within 1 week - 1 month). 3.  Vestibular Rehab Referral - Eval/Therapy  yes Signs to watch for: 
Problems could arise over the first 24-48 hours. You should not be left alone and must go to a hospital at once it you: 1. Have a headache that gets worse. 2. Are very drowsy or cant be awakened (woken up). 3. Cant recognize people or places. 4. Have repeated vomiting 5. Behave unusually or seem confused; are very irritable. 6. Have seizures (arms and legs jerk uncontrollably). 7. Are unsteady on your feet; have slurred speech; vision or hearing changes. Return to Play: Handout for 5 stage RTP given and explained to patient will hold from activities/sports for 7 days. A structured, graded exertion protocol should be developed; individualized on the basis of sport, age and the concussion history of the athlete. Exercise or training should be commenced only after the athlete is clearly asymptomatic with physical and cognitive rest. Final decision for clearance to return to competition should ideally be made by a medical doctor. When returning athletes to play, they should follow a stepwise symptom-limited program, with stages of progression. For example: 1. rest until asymptomatic (physical and mental rest) 2. light aerobic exercise (e.g. stationary cycle) 3. sport-specific exercise 4. non-contact training drills (start light resistance training) 5. full contact training after medical clearance 6. return to competition (game play) There should be approximately 24 hours (or longer) for each stage and the athlete should return to stage 1 if symptoms recur. Resistance training should only be added in the later stages. Medical clearance should be given before return to play. Follow up in 14 Days, will consider Impact/Scat3 test at next visit I have discussed the diagnosis with the patient and the intended plan as seen in the above orders. The patient has received an after-visit summary and questions were answered concerning future plans. Medication Side Effects and Warnings were discussed with patient, Patient Labs were reviewed and or requested, and 
Patient Past Records were reviewed and or requested  yes Pt agrees to call or return to clinic and/or go to closest ER with any worsening of symptoms. This may include, but not limited to increased fever (>100.4) with NSAIDS or Tylenol, increased edema, confusion, rash, worsening of presenting symptoms.

## 2019-02-19 NOTE — PROGRESS NOTES
PT DAILY TREATMENT NOTE - [facility] 2-15 Patient Name: Kike Hernandez Date:2019 : 1960 [x]  Patient  Verified Payor: BLUE CROSS / [place]: [place] [employer] / Product Type: PPO / In time: 12:30pm  Out time: 3:30pm 
Total Treatment Time (min): 60 Total Timed Codes (min): 60 
1:1 Treatment Time ( [address] only): 60 Visit #: 9 Treatment Area: Concussion [S06.0X9A] SUBJECTIVE Pain Level (0-10 scale): 0/10 Any medication changes, allergies to medications, adverse drug reactions, diagnosis change, or new procedure performed?: [x] No    [] Yes (see summary sheet for update) Subjective functional status/changes:   [] No changes reported Pt had follow-up with Dr. Shlomo Hoffmann prior to PT. Pt has been referred to neurologist, opthamologist and ordered a CT for head. OBJECTIVE 25 min Therapeutic exercise:  [x]  See flow sheet : supervised pt perform liberatory maneuver to treat left posterior canal  
Rationale: increase ROM, increase strength, improve coordination and improve balance  to improve the patients ability to perform functional activities 35 min Neuromuscular Re-education:  [x]  See flow sheet :  
Rationale: improve coordination, improve balance and increase proprioception  to improve the patients ability to perform functional and recreational activities With 
 [] TE 
 [] TA 
 [] neuro 
 [] other: Patient Education: [x] Review HEP [] Progressed/Changed HEP based on:  
[] positioning   [] body mechanics   [] transfers   [] heat/ice application   
[] other:   
 
Other Objective/Functional Measures: --  
 
Pain Level (0-10 scale) post treatment: 0/10 ASSESSMENT/Changes in Function:  
Pt able to perform all visual exercises without reproduction of dizziness or HA.   Patient will continue to benefit from skilled PT services to modify and progress therapeutic interventions, address functional mobility deficits, address ROM deficits, address strength deficits, analyze and address soft tissue restrictions, analyze and cue movement patterns, analyze and modify body mechanics/ergonomics, assess and modify postural abnormalities and address imbalance/dizziness to attain remaining goals. []  See Plan of Care 
[]  See progress note/recertification 
[]  See Discharge Summary Progress towards goals / Updated goals: 
Short Term Goals: To be accomplished in 3 weeks: 
1) Pt will be independent with HEP. MET 
2) Pt will be able to sit with upright posture >/= 5 minutes without increase of symptoms. MET 
3) Pt will report improvement in cervical Rotation to look over shoulders while driving. MET 
4) Perform VOR Cancellation in sitting for >/=30 seconds before onset of symptoms. MET 
5) Balance on firm surface with eyes closed >/= 30 seconds before onset of symptoms. MET 
  
Long Term Goals: To be accomplished in 6 weeks: 
1) Pt will be able to read without increase of neck pain. MET 
2) Pt will be able to look over shoulders while driving without increase of neck pain. MET 
3) Pt will demonstrate ability to lift >/= 15 lbs without increase of symptoms. progressing 4) Perform VOR Cancellation while standing without reproduction of symptoms. MET 
5) Balance on soft surface with eyes closed >/= 30 seconds without symptoms. progressing 6) Tolerate visual conflict while walking without symptoms. MET 
 
PLAN [x]  Upgrade activities as tolerated     [x]  Continue plan of care 
[]  Update interventions per flow sheet      
[]  Discharge due to:_ 
[]  Other:_   
 
Anjum Khanna, PT, DPT   2/19/2019

## 2019-02-20 ENCOUNTER — OFFICE VISIT (OUTPATIENT)
Dept: NEUROLOGY | Age: 59
End: 2019-02-20

## 2019-02-20 VITALS
HEIGHT: 67 IN | SYSTOLIC BLOOD PRESSURE: 132 MMHG | DIASTOLIC BLOOD PRESSURE: 80 MMHG | BODY MASS INDEX: 33.27 KG/M2 | HEART RATE: 73 BPM | RESPIRATION RATE: 6 BRPM | OXYGEN SATURATION: 97 % | WEIGHT: 212 LBS

## 2019-02-20 DIAGNOSIS — S06.300A: Primary | ICD-10-CM

## 2019-02-20 DIAGNOSIS — S06.6X0D SUBARACHNOID HEMORRHAGE FOLLOWING INJURY, NO LOSS OF CONSCIOUSNESS, SUBSEQUENT ENCOUNTER: ICD-10-CM

## 2019-02-20 NOTE — PROGRESS NOTES
Chief Complaint Patient presents with  Concussion Referred by: Dr. Igor BAILEY Edmond Vaughan is a 59-year-old woman who is here for follow-up. She was ice skating on [address] taking a class and fell backwards. Her feet slipped out from in front of her. She landed backwards striking the back of her head. She recalls being on the ice paralyzed briefly. No loss of consciousness. She was taken to the hospital.  Ultimately she was found to have a traumatic subarachnoid hemorrhage. She was admitted and observed. She never required intervention. She initially had severe headaches and tinnitus and vertigo. Currently the vertigo still persists. She is still participating in concussion rehab. She still gets occasional headaches but very rarely. Sleep is good. 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 Her left eye has some vitreous retinal issues being evaluated by ophthalmology. Review of Systems Constitutional: Negative for malaise/fatigue. Eyes: Negative for blurred vision and double vision. Gastrointestinal: Negative for nausea. Neurological: Positive for dizziness and headaches. Psychiatric/Behavioral: The patient does not have insomnia. All other systems reviewed and are negative. Past Medical History:  
Diagnosis Date  Headache  Hypertension Family History Problem Relation Age of Onset  Macular Degen Mother  Hypertension Father Social History Socioeconomic History  Marital status:  Spouse name: Not on file  Number of children: Not on file  Years of education: Not on file  Highest education level: Not on file Social Needs  Financial resource strain: Not on file  Food insecurity - worry: Not on file  Food insecurity - inability: Not on file  Transportation needs - medical: Not on file  Transportation needs - non-medical: Not on file Occupational History  Not on file Tobacco Use  
  Smoking status: Never Smoker  Smokeless tobacco: Never Used Substance and Sexual Activity  Alcohol use: Yes Comment: 2-4 drinks per week  Drug use: No  
 Sexual activity: Yes  
  Partners: Female Birth control/protection: None Other Topics Concern  Not on file Social History Narrative  Not on file Current Outpatient Medications Medication Sig  PARoxetine (PAXIL) 40 mg tablet TAKE 1 TABLET BY MOUTH DAILY  lisinopril (PRINIVIL, ZESTRIL) 20 mg tablet Take 1 Tab by mouth daily.  timolol (TIMOPTIC) 0.5 % ophthalmic solution INSTILL 1 DROP IN RIGHT EYE IN THE MORNING  
 omeprazole (PRILOSEC) 20 mg capsule Take 20 mg by mouth daily.  butalbital-acetaminophen-caffeine (FIORICET, ESGIC) -40 mg per tablet Take 1 Tab by mouth every four (4) hours as needed for Headache.  HYDROcodone-acetaminophen (NORCO) 5-325 mg per tablet Take 1 Tab by mouth every six (6) hours as needed. Max Daily Amount: 4 Tabs. No current facility-administered medications for this visit. No Known Allergies Neurologic Exam  
 
Mental Status Oriented to person, place, and time. Cranial Nerves Cranial nerves II through XII intact. Motor Exam  
Muscle bulk: normal 
 
Strength Strength 5/5 throughout. Sensory Exam  
Light touch normal.  
 
Gait, Coordination, and Reflexes Gait Gait: normal 
 
Coordination Romberg: negative Finger to nose coordination: normal 
 
Reflexes Right brachioradialis: 2+ Left brachioradialis: 2+ Right biceps: 2+ Left biceps: 2+ Right triceps: 2+ Left triceps: 2+ Right patellar: 2+ Left patellar: 2+ Right achilles: 2+ Left achilles: 2+ Physical Exam  
Constitutional: She is oriented to person, place, and time. She appears well-developed and well-nourished. Cardiovascular: Normal rate. Pulmonary/Chest: Effort normal.  
Neurological: She is oriented to person, place, and time.  She has normal strength. She has a normal Finger-Nose-Finger Test and a normal Romberg Test. Gait normal.  
Reflex Scores: 
     Tricep reflexes are 2+ on the right side and 2+ on the left side. Bicep reflexes are 2+ on the right side and 2+ on the left side. Brachioradialis reflexes are 2+ on the right side and 2+ on the left side. Patellar reflexes are 2+ on the right side and 2+ on the left side. Achilles reflexes are 2+ on the right side and 2+ on the left side. Skin: Skin is warm and dry. Psychiatric: She has a normal mood and affect. Her behavior is normal.  
Vitals reviewed. Visit Vitals /80 Pulse 73 Resp (!) 6 Ht 5' 7\" (1.702 m) Wt 96.2 kg (212 lb) SpO2 97% BMI 33.20 kg/m² Lab Results Component Value Date/Time WBC 8.6 01/10/2019 08:40 PM  
 HGB 13.6 01/10/2019 08:40 PM  
 HCT 42.2 01/10/2019 08:40 PM  
 PLATELET 212 14/54/0379 08:40 PM  
 MCV 94.8 01/10/2019 08:40 PM  
 
Lab Results Component Value Date/Time Hemoglobin A1c 6.0 (H) 09/30/2016 12:05 PM  
 Glucose 121 (H) 01/10/2019 08:40 PM  
 LDL, calculated 187 (H) 09/30/2016 12:05 PM  
 Creatinine 1.07 (H) 01/10/2019 08:40 PM  
  
Lab Results Component Value Date/Time Cholesterol, total 271 (H) 09/30/2016 12:05 PM  
 HDL Cholesterol 65 09/30/2016 12:05 PM  
 LDL, calculated 187 (H) 09/30/2016 12:05 PM  
 Triglyceride 94 09/30/2016 12:05 PM  
 
Lab Results Component Value Date/Time ALT (SGPT) 28 04/06/2018 10:19 AM  
 AST (SGOT) 21 04/06/2018 10:19 AM  
 Alk. phosphatase 95 04/06/2018 10:19 AM  
 Bilirubin, total 0.3 04/06/2018 10:19 AM  
 Albumin 4.1 04/06/2018 10:19 AM  
 Protein, total 7.0 04/06/2018 10:19 AM  
 INR 1.0 01/10/2019 08:40 PM  
 Prothrombin time 9.6 01/10/2019 08:40 PM  
 PLATELET 832 01/75/4131 08:40 PM  
  
 
CT Results (maximum last 3): Results from Hospital Encounter encounter on 01/10/19 CT HEAD WO CONT Narrative INDICATION:   SAH, proven by LP or imaging EXAM:  HEAD CT WITHOUT CONTRAST 
 
COMPARISON:  1/10/2019 TECHNIQUE:  Routine noncontrast axial head CT was performed. Sagittal and 
coronal reconstructions were generated. CT dose reduction was achieved through use of a standardized protocol tailored 
for this examination and automatic exposure control for dose modulation. FINDINGS: 
 
Ventricles:  Midline, no hydrocephalus. Intracranial Hemorrhage:  Small amount of subarachnoid hemorrhage in the right 
sylvian fissure may have redistributed from the prior study, between the 
inferior frontal lobes. No new intracranial hemorrhage. Brain Parenchyma/Brainstem:  Normal for age. Basal Cisterns:  Normal.  
Paranasal Sinuses:  Visualized sinuses are clear. Additional Comments:  Soft tissue swelling in the midline parietal scalp. Impression IMPRESSION: 
 
Redistribution of small amount of subarachnoid hemorrhage from the anterior 
inferior frontal region to the right sylvian fissure. No new intracranial 
abnormality. CT HEAD WO CONT Narrative HEAD CT WITHOUT CONTRAST: 1/10/2019 8:04 PM 
 
INDICATION: Fall, with head injury and dizziness. COMPARISON: None. PROCEDURE: Axial images of the head were obtained without contrast. Coronal and 
sagittal reformats were performed. CT dose reduction was achieved through use of 
a standardized protocol tailored for this examination and automatic exposure 
control for dose modulation. Adaptive statistical iterative reconstruction 
(ASIR) was utilized. FINDINGS: A laceration over the occiput is associated with a small to moderate 
scalp hematoma. There is trace subarachnoid hemorrhage between the 
anteroinferior frontal lobes (601-31, 601-35, 2-8). The ventricles and sulci are 
appropriate in size and configuration for age. No loss of gray-white 
differentiation to suggest late acute or early subacute infarction. No mass effect or intracranial hemorrhage. A right scleral buckle is in place. Impression IMPRESSION:  
1. Trace subarachnoid hemorrhage between the anteroinferior frontal lobes. No 
mass effect. 2. Occiput laceration with small to moderate scalp hematoma. The findings were called to Dr. Adolfo Massey on 1/10/2019 8:17 PM by Dr. Silvana Celis. [address] MRI Results (maximum last 3): No results found for this or any previous visit. PET Results (maximum last 3): No results found for this or any previous visit. Assessment and Plan Diagnoses and all orders for this visit: 1. Focal traumatic brain injury without loss of consciousness, initial encounter ([facility] [address] 2. Subarachnoid hemorrhage following injury, no loss of consciousness, subsequent encounter 58-year-old woman who had a traumatic brain injury possibly coup contrecoup injury with resultant subarachnoid hemorrhage now improving. Clinically she seems to have recovered very well. She still has some persistent peripheral vertigo which may take more time to recuperate. Difficult to know if she will be completely symptom-free. Her other symptoms have improved very well. I recommend she continue concussion therapy until she is discharged from them. Start taking magnesium supplements at night which might be neuroprotective and help expedite recovery. Exam is appropriate. I have no need for repeat imaging at this time. Follow-up here as needed. A notice of this visit/encounter being completed has been sent electronically to the patient's PCP and/or referring provider. 2 [facility],  
NEUROLOGIST Diplomate NAEL

## 2019-02-20 NOTE — PATIENT INSTRUCTIONS
Learning About a Closed Head Injury What is a closed head injury? A closed head injury happens when your head gets hit hard. The strong force of the blow causes your brain to shake in your skull. This movement can cause the brain to bruise, swell, or tear. Sometimes nerves or blood vessels also get damaged. This can cause bleeding in or around the brain. A concussion is a type of closed head injury. What are the symptoms? If you have a mild concussion, you may have a mild headache or feel \"not quite right. \" These symptoms are common. They usually go away over a few days to 4 weeks. But sometimes after a concussion, you feel like you can't function as well as before the injury. And you have new symptoms. This is called postconcussive syndrome. You may: · Find it harder to solve problems, think, concentrate, or remember. · Have headaches. · Have changes in your sleep patterns, such as not being able to sleep or sleeping all the time. · Have changes in your personality. · Not be interested in your usual activities. · Feel angry or anxious without a clear reason. · Lose your sense of taste or smell. · Be dizzy, lightheaded, or unsteady. It may be hard to stand or walk. How is a closed head injury treated? Any person who may have a concussion needs to see a doctor. Some people have to stay in the hospital to be watched. Others can go home safely. If you go home, follow your doctor's instructions. He or she will tell you if you need someone to watch you closely for the next 24 hours or longer. Rest is the best treatment. Get plenty of sleep at night. And try to rest during the day. · Avoid activities that are physically or mentally demanding. These include housework, exercise, and schoolwork. And don't play video games, send text messages, or use the computer. You may need to change your school or work schedule to be able to avoid these activities. · Ask your doctor when it's okay to drive, ride a bike, or operate machinery. · Take an over-the-counter pain medicine, such as acetaminophen (Tylenol), ibuprofen (Advil, Motrin), or naproxen (Aleve). Be safe with medicines. Read and follow all instructions on the label. · Check with your doctor before you use any other medicines for pain. · Do not drink alcohol or use illegal drugs. They can slow recovery. They can also increase your risk of getting a second head injury. Follow-up care is a key part of your treatment and safety. Be sure to make and go to all appointments, and call your doctor if you are having problems. It's also a good idea to know your test results and keep a list of the medicines you take. Where can you learn more? Go to http://cassie-barrington.info/. Enter E235 in the search box to learn more about \"Learning About a Closed Head Injury. \" Current as of: Tessie 3, 2018 Content Version: 11.9 © 6825-0861 [employer]. Care instructions adapted under license by [employer] (which disclaims liability or warranty for this information). If you have questions about a medical condition or this instruction, always ask your healthcare professional. [address] any warranty or liability for your use of this information.

## 2019-02-21 ENCOUNTER — HOSPITAL ENCOUNTER (OUTPATIENT)
Dept: PHYSICAL THERAPY | Age: 59
Discharge: HOME | End: 2019-02-21
Payer: COMMERCIAL

## 2019-02-21 PROCEDURE — 97140 MANUAL THERAPY 1/> REGIONS: CPT

## 2019-02-21 PROCEDURE — 97112 NEUROMUSCULAR REEDUCATION: CPT

## 2019-02-21 PROCEDURE — 97110 THERAPEUTIC EXERCISES: CPT

## 2019-02-21 NOTE — PROGRESS NOTES
\PT DAILY TREATMENT NOTE - [facility] 2-15 Patient Name: Lauren Handley Date:2019 : 1960 [x]  Patient  Verified Payor: BLUE CROSS / Plan: [facility] BL[facility] / Product Type: PPO / In time: 1:05pm  Out time: 2:05pm 
Total Treatment Time (min): 60 Total Timed Codes (min): 60 
1:1 Treatment Time ( [address] only): 60 Visit #: 50 Treatment Area: Concussion [S06.0X9A] SUBJECTIVE Pain Level (0-10 scale): 0/10 Any medication changes, allergies to medications, adverse drug reactions, diagnosis change, or new procedure performed?: [x] No    [] Yes (see summary sheet for update) Subjective functional status/changes:   [] No changes reported Pt had appointment with opthalmologist and neurologist since last PT session. Pt was cleared by neurology. Pt states that ophthalmologist said that \"cresent\" light she is experiencing at night in left eye is a normal, age-appropriate change. Pt complains of HA, which she believes is due to having light shined in her eyes at the doctor's office yesterday. OBJECTIVE 15 min Therapeutic exercise:  [x]  See flow sheet : supervised pt perform liberatory maneuver to treat left posterior canal  
Rationale: increase ROM, increase strength, improve coordination and improve balance  to improve the patients ability to perform functional activities 30 min Neuromuscular Re-education:  [x]  See flow sheet :  
Rationale: improve coordination, improve balance and increase proprioception  to improve the patients ability to perform functional and recreational activities 15 min Manual Therapy: STM bilateral cervical paraspinals and UTs; TPR right UT; gentle cervical traction Rationale: decrease pain, increase ROM, increase tissue extensibility, decrease trigger points and increase postural awareness to decrease HA With 
 [] TE 
 [] TA 
 [] neuro 
 [] other: Patient Education: [x] Review HEP [] Progressed/Changed HEP based on: [] positioning   [] body mechanics   [] transfers   [] heat/ice application   
[] other:   
 
Other Objective/Functional Measures: --  
 
Pain Level (0-10 scale) post treatment: 0/10 ASSESSMENT/Changes in Function:  
Greater difficulty performing balance exercises on foam with eyes closed. Pt easily frustrated but performance improved with subsequent reps. Patient will continue to benefit from skilled PT services to modify and progress therapeutic interventions, address functional mobility deficits, address ROM deficits, address strength deficits, analyze and address soft tissue restrictions, analyze and cue movement patterns, analyze and modify body mechanics/ergonomics, assess and modify postural abnormalities and address imbalance/dizziness to attain remaining goals. []  See Plan of Care 
[]  See progress note/recertification 
[]  See Discharge Summary Progress towards goals / Updated goals: 
Short Term Goals: To be accomplished in 3 weeks: 
1) Pt will be independent with HEP. MET 
2) Pt will be able to sit with upright posture >/= 5 minutes without increase of symptoms. MET 
3) Pt will report improvement in cervical Rotation to look over shoulders while driving. MET 
4) Perform VOR Cancellation in sitting for >/=30 seconds before onset of symptoms. MET 
5) Balance on firm surface with eyes closed >/= 30 seconds before onset of symptoms. MET 
  
Long Term Goals: To be accomplished in 6 weeks: 
1) Pt will be able to read without increase of neck pain. MET 
2) Pt will be able to look over shoulders while driving without increase of neck pain. MET 
3) Pt will demonstrate ability to lift >/= 15 lbs without increase of symptoms. progressing 4) Perform VOR Cancellation while standing without reproduction of symptoms. MET 
5) Balance on soft surface with eyes closed >/= 30 seconds without symptoms. progressing 6) Tolerate visual conflict while walking without symptoms.  MET 
 
PLAN 
 [x]  Upgrade activities as tolerated     [x]  Continue plan of care 
[]  Update interventions per flow sheet      
[]  Discharge due to:_ 
[]  Other:_   
 
Ngoc Marshall, PT, DPT   2/21/2019

## 2019-02-25 ENCOUNTER — HOSPITAL ENCOUNTER (OUTPATIENT)
Dept: CT IMAGING | Age: 59
Discharge: HOME | End: 2019-02-25
Attending: FAMILY MEDICINE
Payer: COMMERCIAL

## 2019-02-25 DIAGNOSIS — S06.6X0A SUBARACHNOID HEMORRHAGE FOLLOWING INJURY, NO LOSS OF CONSCIOUSNESS, INITIAL ENCOUNTER (HCC): ICD-10-CM

## 2019-02-25 PROCEDURE — 70450 CT HEAD/BRAIN W/O DYE: CPT

## 2019-02-26 ENCOUNTER — HOSPITAL ENCOUNTER (OUTPATIENT)
Dept: PHYSICAL THERAPY | Age: 59
Discharge: HOME | End: 2019-02-26
Payer: COMMERCIAL

## 2019-02-26 PROCEDURE — 97110 THERAPEUTIC EXERCISES: CPT

## 2019-02-26 PROCEDURE — 97112 NEUROMUSCULAR REEDUCATION: CPT

## 2019-02-26 NOTE — ANCILLARY DISCHARGE INSTRUCTIONS
[facility] Physical Therapy [address] [address], [address] Phone: [phone]  Fax: [phone] Discharge Summary  2-15 Patient name: Florina Rios  : 1960  Provider#: [id number] Referral source: Marino Mayo MD     
Medical/Treatment Diagnosis: Concussion [S06.0X9A] Prior Hospitalization: see medical history Comorbidities: HTN, anxiety/depression Prior Level of Function:retired Medications: Verified on Patient Summary List 
 
Start of Care: 01/15/2019     Onset Date:01/10/2019 Visits from Start of Care: 12     Missed Visits: 0 Reporting Period : 01/15/2019 to 2019 Progress towards goals / Updated goals: 
Short Term Goals: To be accomplished in 3 weeks: 
1) Pt will be independent with HEP. MET 
2) Pt will be able to sit with upright posture >/= 5 minutes without increase of symptoms. MET 
3) Pt will report improvement in cervical Rotation to look over shoulders while driving. MET 
4) Perform VOR Cancellation in sitting for >/=30 seconds before onset of symptoms. MET 
5) Balance on firm surface with eyes closed >/= 30 seconds before onset of symptoms. MET 
  
Long Term Goals: To be accomplished in 6 weeks: 
1) Pt will be able to read without increase of neck pain. MET 
2) Pt will be able to look over shoulders while driving without increase of neck pain. MET 
3) Pt will demonstrate ability to lift >/= 15 lbs without increase of symptoms. MET 
4) Perform VOR Cancellation while standing without reproduction of symptoms. MET 
5) Balance on soft surface with eyes closed >/= 30 seconds without symptoms. MET 6) Tolerate visual conflict while walking without symptoms. MET 
 
ASSESSMENT/SUMMARY OF CARE:  Pt participated in 15 outpatient PT sessions, 01/15/2019-2019, for concussion and muscle strain due to fall while ice skating on 01/10/2019.   Treatment included therapeutic exercise, manual therapy- to cervical spine, neuromuscular re-education- vestibular and visual exercises , moist hot pack, cryotherapy, pt education and home exercise program. Pt demonstrated significant improvement in performance of balance challenges and visual exercises. Pt continues to experience dizziness, especially during transitional movements, but it resolves quickly and is much less intense, compared to initial after injury. Pt reports resolution of HA and has returned to regular activities, including walking her dog and social outings. Pt has met all established PT goals; therefore, pt is discharged to [facility] at this time. RECOMMENDATIONS: 
[x]Discontinue therapy: [x]Patient has reached or is progressing toward set goals []Patient is non-compliant or has abdicated 
    []Due to lack of appreciable progress towards set goals Shlomo Noel, PT, DPT  2/26/2019

## 2019-02-26 NOTE — PROGRESS NOTES
\PT DAILY TREATMENT NOTE - [facility] 2-15 Patient Name: Heather Pitt Date:2019 : 1960 [x]  Patient  Verified Payor: BLUE CROSS / Plan: [facility] BLUE [place]OSS [employer]ER[facility] / Product Type: PPO / In time: 2:30pm  Out time: 3:25pm 
Total Treatment Time (min): 55 Total Timed Codes (min): 55 
1:1 Treatment Time ( [address] only): 30 Visit #: 72 Treatment Area: Concussion [S06.0X9A] SUBJECTIVE Pain Level (0-10 scale): 0/10 Any medication changes, allergies to medications, adverse drug reactions, diagnosis change, or new procedure performed?: [x] No    [] Yes (see summary sheet for update) Subjective functional status/changes:   [] No changes reported Pt received results from head CT- \"complete resolution of the small amount of right-sided subarachnoid\". Pt denies having HA since last PT session. Pt continues to experience dizziness, especially during transitional movements. OBJECTIVE 20 min Therapeutic exercise:  [x]  See flow sheet : supervised pt perform liberatory maneuver to treat left posterior canal  
Rationale: increase ROM, increase strength, improve coordination and improve balance  to improve the patients ability to perform functional activities 35 min Neuromuscular Re-education:  [x]  See flow sheet :  
Rationale: improve coordination, improve balance and increase proprioception  to improve the patients ability to perform functional and recreational activities With 
 [] TE 
 [] TA 
 [] neuro 
 [] other: Patient Education: [x] Review HEP [] Progressed/Changed HEP based on:  
[] positioning   [] body mechanics   [] transfers   [] heat/ice application   
[] other:   
 
Other Objective/Functional Measures: --  
 
Pain Level (0-10 scale) post treatment: 0/10 ASSESSMENT/Changes in Function:  
[]  See Plan of Care 
[]  See progress note/recertification 
[x]  See Discharge Summary Progress towards goals / Updated goals: 
Short Term Goals: To be accomplished in 3 weeks: 1) Pt will be independent with HEP. MET 
2) Pt will be able to sit with upright posture >/= 5 minutes without increase of symptoms. MET 
3) Pt will report improvement in cervical Rotation to look over shoulders while driving. MET 
4) Perform VOR Cancellation in sitting for >/=30 seconds before onset of symptoms. MET 
5) Balance on firm surface with eyes closed >/= 30 seconds before onset of symptoms. MET 
  
Long Term Goals: To be accomplished in 6 weeks: 
1) Pt will be able to read without increase of neck pain. MET 
2) Pt will be able to look over shoulders while driving without increase of neck pain. MET 
3) Pt will demonstrate ability to lift >/= 15 lbs without increase of symptoms. MET 
4) Perform VOR Cancellation while standing without reproduction of symptoms. MET 
5) Balance on soft surface with eyes closed >/= 30 seconds without symptoms. MET 6) Tolerate visual conflict while walking without symptoms. MET 
 
PLAN 
[]  Upgrade activities as tolerated     []  Continue plan of care 
[]  Update interventions per flow sheet [x]  Discharge due to:_goals met 
[]  Other:_   
 
Richard Hobbs, PT, DPT   2/26/2019

## 2019-02-28 ENCOUNTER — APPOINTMENT (OUTPATIENT)
Dept: PHYSICAL THERAPY | Age: 59
End: 2019-02-28
Payer: COMMERCIAL

## 2019-03-20 ENCOUNTER — HOSPITAL ENCOUNTER (OUTPATIENT)
Dept: MAMMOGRAPHY | Age: 59
Discharge: HOME | End: 2019-03-20
Attending: OBSTETRICS & GYNECOLOGY
Payer: COMMERCIAL

## 2019-03-20 DIAGNOSIS — Z12.31 VISIT FOR SCREENING MAMMOGRAM: ICD-10-CM

## 2019-03-20 PROCEDURE — 77067 SCR MAMMO BI INCL CAD: CPT

## 2019-04-30 ENCOUNTER — APPOINTMENT (OUTPATIENT)
Dept: CT IMAGING | Age: 59
End: 2019-04-30
Attending: STUDENT IN AN ORGANIZED HEALTH CARE EDUCATION/TRAINING PROGRAM
Payer: COMMERCIAL

## 2019-04-30 ENCOUNTER — HOSPITAL ENCOUNTER (EMERGENCY)
Age: 59
Discharge: HOME | End: 2019-04-30
Attending: STUDENT IN AN ORGANIZED HEALTH CARE EDUCATION/TRAINING PROGRAM
Payer: COMMERCIAL

## 2019-04-30 VITALS
RESPIRATION RATE: 15 BRPM | TEMPERATURE: 98.5 F | HEART RATE: 81 BPM | SYSTOLIC BLOOD PRESSURE: 118 MMHG | OXYGEN SATURATION: 99 % | DIASTOLIC BLOOD PRESSURE: 77 MMHG

## 2019-04-30 DIAGNOSIS — R42 VERTIGO: Primary | ICD-10-CM

## 2019-04-30 LAB
ANION GAP BLD CALC-SCNC: 14 MMOL/L (ref 10–20)
BUN BLD-MCNC: 19 MG/DL (ref 9–20)
CA-I BLD-MCNC: 1.21 MMOL/L (ref 1.12–1.32)
CHLORIDE BLD-SCNC: 108 MMOL/L (ref 98–107)
CO2 BLD-SCNC: 25 MMOL/L (ref 21–32)
CREAT BLD-MCNC: 0.9 MG/DL (ref 0.6–1.3)
GLUCOSE BLD STRIP.AUTO-MCNC: 105 MG/DL (ref 65–100)
GLUCOSE BLD-MCNC: 115 MG/DL (ref 65–100)
HCT VFR BLD CALC: 42 % (ref 35–47)
POTASSIUM BLD-SCNC: 4.6 MMOL/L (ref 3.5–5.1)
SERVICE CMNT-IMP: ABNORMAL
SERVICE CMNT-IMP: ABNORMAL
SODIUM BLD-SCNC: 141 MMOL/L (ref 136–145)

## 2019-04-30 PROCEDURE — 99284 EMERGENCY DEPT VISIT MOD MDM: CPT

## 2019-04-30 PROCEDURE — 72125 CT NECK SPINE W/O DYE: CPT

## 2019-04-30 PROCEDURE — 82962 GLUCOSE BLOOD TEST: CPT

## 2019-04-30 PROCEDURE — 80047 BASIC METABLC PNL IONIZED CA: CPT

## 2019-04-30 PROCEDURE — 70450 CT HEAD/BRAIN W/O DYE: CPT

## 2019-04-30 RX ORDER — MECLIZINE HYDROCHLORIDE 25 MG/1
25 TABLET ORAL
Qty: 15 TAB | Refills: 0 | Status: SHIPPED | OUTPATIENT
Start: 2019-04-30 | End: 2019-05-10

## 2019-04-30 RX ORDER — LISINOPRIL 20 MG/1
TABLET ORAL
Qty: 90 TAB | Refills: 0 | Status: SHIPPED | OUTPATIENT
Start: 2019-04-30 | End: 2019-06-28 | Stop reason: SDUPTHER

## 2019-04-30 NOTE — ED TRIAGE NOTES
Pt arrives ambulatory c/o 'tingling in both arms for a few months\". Pt states the pain comes and goes. Pt has history of subarachnoid bleed from ice skating this past January. Pt denies  Headache but c/o new onset of dizziness this morning. . Pt arrives AOx4 in no apparent distress.

## 2019-04-30 NOTE — ED TRIAGE NOTES
Patient reports dizziness x a few weeks and intermittent numbness in her arms. Ambulatory, no acute distress noted. Arrived with family member.

## 2019-04-30 NOTE — DISCHARGE INSTRUCTIONS
When should you call for help? Call 911 anytime you think you may need emergency care. For example, call if:    You have weakness, numbness, or tingling in both legs. You lose bowel or bladder control. You have symptoms of a stroke. These may include:  Sudden numbness, tingling, weakness, or loss of movement in your face, arm, or leg, especially on only one side of your body. Sudden vision changes. Sudden trouble speaking. Sudden confusion or trouble understanding simple statements. Sudden problems with walking or balance. A sudden, severe headache that is different from past headaches.

## 2019-04-30 NOTE — ED PROVIDER NOTES
Ayesha Moore is a 60 y.o. female with past medical history notable for Traumatic subarachnoid hemorrhage in January 2019, borderline diabetes per patient presenting with 3 days of transient very brief episodes of tingling affecting the lateral upper extremities, dorsal and volar aspects, radiating to the trunk. No association with movement, position. Not associated with neck movement. 1 min transient whirling vertigo upon sitting up. asymtpoamtic now. No weakness, numbness, apraxia, ataixa. Past Medical History:  
Diagnosis Date  Headache  Hypertension  Subarachnoid hemorrhage ([address] Past Surgical History:  
Procedure Laterality Date  HX HEENT    
 HX OTHER SURGICAL Right 1981  
 eye Family History:  
Problem Relation Age of Onset  Macular Degen Mother  Hypertension Father Social History Socioeconomic History  Marital status:  Spouse name: Not on file  Number of children: Not on file  Years of education: Not on file  Highest education level: Not on file Occupational History  Not on file Social Needs  Financial resource strain: Not on file  Food insecurity:  
  Worry: Not on file Inability: Not on file  Transportation needs:  
  Medical: Not on file Non-medical: Not on file Tobacco Use  Smoking status: Never Smoker  Smokeless tobacco: Never Used Substance and Sexual Activity  Alcohol use: Yes Comment: 2-4 drinks per week  Drug use: No  
 Sexual activity: Yes  
  Partners: Female Birth control/protection: None Lifestyle  Physical activity:  
  Days per week: Not on file Minutes per session: Not on file  Stress: Not on file Relationships  Social connections:  
  Talks on phone: Not on file Gets together: Not on file Attends Syn[employer] service: Not on file Active member of club or organization: Not on file Attends meetings of clubs or organizations: Not on file Relationship status: Not on file  Intimate partner violence:  
  Fear of current or ex partner: Not on file Emotionally abused: Not on file Physically abused: Not on file Forced sexual activity: Not on file Other Topics Concern  Not on file Social History Narrative  Not on file ALLERGIES: Patient has no known allergies. Review of Systems Vitals:  
 04/30/19 1340 Pulse: 78 Resp: 15 SpO2: 99% Physical Exam  
Constitutional: She is oriented to person, place, and time. She appears well-nourished. No distress. HENT:  
Head: Normocephalic. Eyes: EOM are normal.  
R eye with sugrically absent iris. Cardiovascular: Intact distal pulses. Pulmonary/Chest: Effort normal. No stridor. No respiratory distress. Abdominal: She exhibits no distension. Neurological: She is alert and oriented to person, place, and time. She is not disoriented. She displays atrophy. She displays no tremor. No cranial nerve deficit. She exhibits normal muscle tone. She displays a negative Romberg sign. Coordination and gait normal.  
Skin: Skin is warm. Psychiatric: Her behavior is normal.  
  
 
MDM Number of Diagnoses or Management Options Vertigo:  
Diagnosis management comments: Patient with 2 complaints-intermittent transient momentary tingling involving the bilateral upper extremities and trunk. No weakness. Considered cervical stenosis however this is negative. She has no motor deficit. No difficulty with fine motor tasks such as writing or eating with silverware. Exam she does not have any  dysmetria. Will see her primary care in a few days, may consider referral to neurology if persistent. In regards to her vertigo, this is likely peripheral. She did have some reproducibility with  Epley maneuver to the right. Plan to discharge with meclizine prescription. Procedures

## 2019-05-02 ENCOUNTER — OFFICE VISIT (OUTPATIENT)
Dept: INTERNAL MEDICINE CLINIC | Age: 59
End: 2019-05-02

## 2019-05-02 ENCOUNTER — TELEPHONE (OUTPATIENT)
Dept: NEUROLOGY | Age: 59
End: 2019-05-02

## 2019-05-02 VITALS
RESPIRATION RATE: 16 BRPM | WEIGHT: 214 LBS | HEIGHT: 67 IN | HEART RATE: 83 BPM | DIASTOLIC BLOOD PRESSURE: 86 MMHG | TEMPERATURE: 98 F | SYSTOLIC BLOOD PRESSURE: 120 MMHG | OXYGEN SATURATION: 97 % | BODY MASS INDEX: 33.59 KG/M2

## 2019-05-02 DIAGNOSIS — H81.11 BENIGN PAROXYSMAL POSITIONAL VERTIGO OF RIGHT EAR: ICD-10-CM

## 2019-05-02 DIAGNOSIS — R20.2 NUMBNESS AND TINGLING: Primary | ICD-10-CM

## 2019-05-02 DIAGNOSIS — R20.0 NUMBNESS AND TINGLING: Primary | ICD-10-CM

## 2019-05-02 DIAGNOSIS — R73.9 ELEVATED BLOOD SUGAR: ICD-10-CM

## 2019-05-02 NOTE — PROGRESS NOTES
Chief Complaint Patient presents with  Tingling  
 
she is a 60y.o. year old female who presents for follow-up of going to ER for dizziness and numbness/tingling in both arms. Patient states that they are still happening and have been going on for a couple days. Of note she does state that last week she made a mistake in her pillbox and doubled her dose of Paxil last Tuesday and Wednesday. She thinks she may have skipped some days due to this doubling. States that her symptoms somewhat correlate with this event. Denies any worsening symptoms and actually feels little better today. States that her numbness tingling come in waves and she is had 2 waves today the last few minutes. Denies any shortness of breath chest pain or HARDY Reviewed and agree with Nurse Note and duplicated in this note. Reviewed PmHx, RxHx, FmHx, SocHx, AllgHx and updated and dated in the chart. Family History Problem Relation Age of Onset  Macular Degen Mother  Hypertension Father Past Medical History:  
Diagnosis Date  Headache  Hypertension  Subarachnoid hemorrhage ([facility] [address] Social History Socioeconomic History  Marital status:  Spouse name: Not on file  Number of children: Not on file  Years of education: Not on file  Highest education level: Not on file Tobacco Use  Smoking status: Never Smoker  Smokeless tobacco: Never Used Substance and Sexual Activity  Alcohol use: Yes Comment: 2-4 drinks per week  Drug use: No  
 Sexual activity: Yes  
  Partners: Female Birth control/protection: None Review of Systems - negative except as listed above Objective:  
 
Vitals:  
 05/02/19 1051 BP: 120/86 Pulse: 83 Resp: 16 Temp: 98 °F (36.7 °C) TempSrc: Oral  
SpO2: 97% Weight: 214 lb (97.1 kg) Height: 5' 7\" (1.702 m) Physical Examination: General appearance - alert, well appearing, and in no distress Eyes - pupils equal and reactive, extraocular eye movements intact Ears - bilateral TM's and external ear canals normal 
Nose - normal and patent, no erythema, discharge or polyps Mouth - mucous membranes moist, pharynx normal without lesions Neck - supple, no significant adenopathy Chest - clear to auscultation, no wheezes, rales or rhonchi, symmetric air entry Heart - normal rate, regular rhythm, normal S1, S2, no murmurs, rubs, clicks or gallops Abdomen - soft, nontender, nondistended, no masses or organomegaly Musculoskeletal - no joint tenderness, deformity or swelling Extremities - peripheral pulses normal, no pedal edema, no clubbing or cyanosis Skin - normal coloration and turgor, no rashes, no suspicious skin lesions noted Positive Epley maneuver Assessment/ Plan:  
Diagnoses and all orders for this visit: 1. Numbness and tingling 
-     CBC W/O DIFF 
-     TSH 3RD GENERATION 
-     HEPATIC FUNCTION PANEL 
-     CK 
-     REFERRAL TO NEUROLOGY 2. Benign paroxysmal positional vertigo of right ear 
-     REFERRAL TO NEUROLOGY 3. Elevated blood sugar 
-     HEMOGLOBIN A1C WITH EAG Patient understands to go to the ER if worsening symptoms over the weekend I have discussed the diagnosis with the patient and the intended plan as seen in the above orders. The patient has received an after-visit summary and questions were answered concerning future plans. Medication Side Effects and Warnings were discussed with patient, Patient Labs were reviewed and or requested, and 
Patient Past Records were reviewed and or requested  yes Pt agrees to call or return to clinic and/or go to closest ER with any worsening of symptoms. This may include, but not limited to increased fever (>100.4) with NSAIDS or Tylenol, increased edema, confusion, rash, worsening of presenting symptoms.

## 2019-05-02 NOTE — TELEPHONE ENCOUNTER
----- Message from Saul Pinto sent at 5/2/2019  3:04 PM EDT -----  Regarding: Dr. Jason Monterroso  Pt request a call back from the practice in regards to her headaches that she has been experiencing. She stated that she would like a call back from the nurse. Best contact number is [phone].

## 2019-05-03 LAB
ALBUMIN SERPL-MCNC: 4.5 G/DL (ref 3.5–5.5)
ALP SERPL-CCNC: 104 IU/L (ref 39–117)
ALT SERPL-CCNC: 22 IU/L (ref 0–32)
AST SERPL-CCNC: 19 IU/L (ref 0–40)
BILIRUB DIRECT SERPL-MCNC: 0.09 MG/DL (ref 0–0.4)
BILIRUB SERPL-MCNC: 0.4 MG/DL (ref 0–1.2)
CK SERPL-CCNC: 58 U/L (ref 24–173)
ERYTHROCYTE [DISTWIDTH] IN BLOOD BY AUTOMATED COUNT: 13.8 % (ref 12.3–15.4)
EST. AVERAGE GLUCOSE BLD GHB EST-MCNC: 123 MG/DL
HBA1C MFR BLD: 5.9 % (ref 4.8–5.6)
HCT VFR BLD AUTO: 42.8 % (ref 34–46.6)
HGB BLD-MCNC: 14.1 G/DL (ref 11.1–15.9)
MCH RBC QN AUTO: 31.3 PG (ref 26.6–33)
MCHC RBC AUTO-ENTMCNC: 32.9 G/DL (ref 31.5–35.7)
MCV RBC AUTO: 95 FL (ref 79–97)
PLATELET # BLD AUTO: 251 X10E3/UL (ref 150–379)
PROT SERPL-MCNC: 7.1 G/DL (ref 6–8.5)
RBC # BLD AUTO: 4.5 X10E6/UL (ref 3.77–5.28)
TSH SERPL DL<=0.005 MIU/L-ACNC: 0.79 UIU/ML (ref 0.45–4.5)
WBC # BLD AUTO: 4.7 X10E3/UL (ref 3.4–10.8)

## 2019-05-03 NOTE — TELEPHONE ENCOUNTER
Called pt back. Got message that \"wireless customer you are trying to reach is not available, please call back later. \"

## 2019-05-06 NOTE — TELEPHONE ENCOUNTER
I spoke with patient and she stated she has scheduled a follow up appointment and doesn't need anything further from us at this time.

## 2019-06-28 RX ORDER — LISINOPRIL 20 MG/1
TABLET ORAL
Qty: 90 TAB | Refills: 0 | Status: SHIPPED | OUTPATIENT
Start: 2019-06-28 | End: 2019-11-22 | Stop reason: SDUPTHER

## 2019-06-28 RX ORDER — PAROXETINE HYDROCHLORIDE 40 MG/1
TABLET, FILM COATED ORAL
Qty: 90 TAB | Refills: 0 | Status: SHIPPED | OUTPATIENT
Start: 2019-06-28 | End: 2019-09-26 | Stop reason: SDUPTHER

## 2019-08-06 ENCOUNTER — OFFICE VISIT (OUTPATIENT)
Dept: NEUROLOGY | Age: 59
End: 2019-08-06

## 2019-08-06 VITALS
WEIGHT: 216.8 LBS | HEART RATE: 88 BPM | RESPIRATION RATE: 16 BRPM | OXYGEN SATURATION: 97 % | DIASTOLIC BLOOD PRESSURE: 72 MMHG | BODY MASS INDEX: 34.03 KG/M2 | HEIGHT: 67 IN | SYSTOLIC BLOOD PRESSURE: 122 MMHG

## 2019-08-06 DIAGNOSIS — S06.300D: ICD-10-CM

## 2019-08-06 DIAGNOSIS — R42 POSTTRAUMATIC VERTIGO: Primary | ICD-10-CM

## 2019-08-06 NOTE — PROGRESS NOTES
Ms. Sonia Ordonez presents today to follow up TBI. Patient reports dizziness on a daily basis. Depression screening done on patient.

## 2019-08-06 NOTE — LETTER
8/6/19 Patient: Reece Mccann YOB: 1960 Date of Visit: 8/6/2019 Aureliano Haley MD 
[id number] [id number] VIA In Basket Dear Aureliano Haley MD, Thank you for referring Ms. Beverly Torres to [address] [place] [address] for evaluation. My notes for this consultation are attached. If you have questions, please do not hesitate to call me. I look forward to following your patient along with you. Sincerely, [address] [facility], located within [facility], DO

## 2019-08-06 NOTE — PROGRESS NOTES
Chief Complaint   Patient presents with    Neurologic Problem     Follow-up head injury       HPI    Merlin Heman is a 67-year-old woman here to follow-up. I saw her after she had a traumatic brain injury with subsequent traumatic subarachnoid hemorrhage from a fall during ice skating in January of this year. Since then she has continued to improve. Headaches are mostly gone. She has some hypersensitivity to the back of the head. She still gets persistent vertigo with changing positions such as turning her head, sitting up, lying down. She completed concussion therapy with optimal results despite still having vertigo. Review of Systems   Neurological: Positive for dizziness. Negative for headaches. All other systems reviewed and are negative.       Past Medical History:   Diagnosis Date    Headache     Hypertension     Subarachnoid hemorrhage (HCC)      Family History   Problem Relation Age of Onset    Macular Degen Mother     Hypertension Father      Social History     Socioeconomic History    Marital status:      Spouse name: Not on file    Number of children: Not on file    Years of education: Not on file    Highest education level: Not on file   Occupational History    Not on file   Social Needs    Financial resource strain: Not on file    Food insecurity:     Worry: Not on file     Inability: Not on file    Transportation needs:     Medical: Not on file     Non-medical: Not on file   Tobacco Use    Smoking status: Never Smoker    Smokeless tobacco: Never Used   Substance and Sexual Activity    Alcohol use: Yes     Comment: 2-4 drinks per week    Drug use: No    Sexual activity: Yes     Partners: Female     Birth control/protection: None   Lifestyle    Physical activity:     Days per week: Not on file     Minutes per session: Not on file    Stress: Not on file   Relationships    Social connections:     Talks on phone: Not on file     Gets together: Not on file     Attends Baptism service: Not on file     Active member of club or organization: Not on file     Attends meetings of clubs or organizations: Not on file     Relationship status: Not on file    Intimate partner violence:     Fear of current or ex partner: Not on file     Emotionally abused: Not on file     Physically abused: Not on file     Forced sexual activity: Not on file   Other Topics Concern    Not on file   Social History Narrative    Not on file     No Known Allergies      Current Outpatient Medications   Medication Sig    lisinopril (PRINIVIL, ZESTRIL) 20 mg tablet TAKE 1 TABLET BY MOUTH EVERY DAY    PARoxetine (PAXIL) 40 mg tablet TAKE 1 TABLET BY MOUTH EVERY DAY    timolol (TIMOPTIC) 0.5 % ophthalmic solution INSTILL 1 DROP IN RIGHT EYE IN THE MORNING    omeprazole (PRILOSEC) 20 mg capsule Take 20 mg by mouth daily.  butalbital-acetaminophen-caffeine (FIORICET, ESGIC) -40 mg per tablet Take 1 Tab by mouth every four (4) hours as needed for Headache. No current facility-administered medications for this visit. Neurologic Exam     Mental Status   WD/WN adult in NAD, normal grooming  VSS  A&O x 3    PERRL, nonicteric, no clear nystagmus, EOMI  Face is symmetric, tongue midline  Speech is fluent and clear  No limb ataxia. No abnl movements. Moving all extemities spontaneously and symmetric  Normal gait    CVS RRR  Lungs nonlabored  Skin is warm and dry         Visit Vitals  /72   Pulse 88   Resp 16   Ht 5' 7\" (1.702 m)   Wt 98.3 kg (216 lb 12.8 oz)   SpO2 97%   BMI 33.96 kg/m²       Assessment and Plan   Diagnoses and all orders for this visit:    1. Posttraumatic vertigo  -     REFERRAL TO PHYSICAL THERAPY    2. Focal traumatic brain injury without loss of consciousness, subsequent encounter      25-year-old woman who had traumatic subarachnoid hemorrhage from a fall. She has done very well clinically. Headache is mostly gone.   Unfortunately she is still suffering from peripheral posttraumatic vertigo which does affect her quality of life. I think I am going to have her see a therapist specializes in vestibular therapy in this case. It will be better to see if we could improve this mechanically without having to use medication and she agrees. If unsuccessful she might need to see ENT who specializes in vertigo. Questions answered in detail. Follow-up here as needed. I reviewed and decided to continue the current medications. This clinical note was dictated with an electronic dictation software that can make unintentional errors. If there are any questions, please contact me directly for clarification.     [address] [place] [address], [facility] [address]  Diplomate NAEL

## 2019-09-01 ENCOUNTER — PATIENT OUTREACH (OUTPATIENT)
Dept: INTERNAL MEDICINE CLINIC | Age: 59
End: 2019-09-01

## 2019-09-01 NOTE — PROGRESS NOTES
[facility] [place] 1/10-1/12/2019:  Subarachnoid Hemorrhage post Injury (no loss of consciousness    Patient followed with PCP in office visit/      Episode resolved. Name from Team  Goal completed/closed. Case closed.

## 2019-09-26 RX ORDER — PAROXETINE HYDROCHLORIDE 40 MG/1
TABLET, FILM COATED ORAL
Qty: 90 TAB | Refills: 0 | Status: SHIPPED | OUTPATIENT
Start: 2019-09-26 | End: 2019-12-27

## 2019-11-22 RX ORDER — LISINOPRIL 20 MG/1
TABLET ORAL
Qty: 90 TAB | Refills: 0 | Status: SHIPPED | OUTPATIENT
Start: 2019-11-22 | End: 2021-09-20 | Stop reason: ALTCHOICE

## 2019-12-27 RX ORDER — PAROXETINE HYDROCHLORIDE 40 MG/1
TABLET, FILM COATED ORAL
Qty: 90 TAB | Refills: 0 | Status: SHIPPED | OUTPATIENT
Start: 2019-12-27 | End: 2020-04-27

## 2020-02-12 ENCOUNTER — OFFICE VISIT (OUTPATIENT)
Dept: INTERNAL MEDICINE CLINIC | Age: 60
End: 2020-02-12

## 2020-02-12 VITALS
HEIGHT: 67 IN | WEIGHT: 215 LBS | DIASTOLIC BLOOD PRESSURE: 84 MMHG | HEART RATE: 84 BPM | TEMPERATURE: 98.6 F | SYSTOLIC BLOOD PRESSURE: 134 MMHG | RESPIRATION RATE: 16 BRPM | OXYGEN SATURATION: 96 % | BODY MASS INDEX: 33.74 KG/M2

## 2020-02-12 DIAGNOSIS — I10 ESSENTIAL HYPERTENSION WITH GOAL BLOOD PRESSURE LESS THAN 130/80: Primary | ICD-10-CM

## 2020-02-12 RX ORDER — LISINOPRIL 40 MG/1
40 TABLET ORAL DAILY
Qty: 30 TAB | Refills: 3 | Status: SHIPPED | OUTPATIENT
Start: 2020-02-12 | End: 2020-02-17 | Stop reason: SDUPTHER

## 2020-02-12 NOTE — PATIENT INSTRUCTIONS
DASH Diet: Care Instructions  Your Care Instructions    The DASH diet is an eating plan that can help lower your blood pressure. DASH stands for Dietary Approaches to Stop Hypertension. Hypertension is high blood pressure. The DASH diet focuses on eating foods that are high in calcium, potassium, and magnesium. These nutrients can lower blood pressure. The foods that are highest in these nutrients are fruits, vegetables, low-fat dairy products, nuts, seeds, and legumes. But taking calcium, potassium, and magnesium supplements instead of eating foods that are high in those nutrients does not have the same effect. The DASH diet also includes whole grains, fish, and poultry. The DASH diet is one of several lifestyle changes your doctor may recommend to lower your high blood pressure. Your doctor may also want you to decrease the amount of sodium in your diet. Lowering sodium while following the DASH diet can lower blood pressure even further than just the DASH diet alone. Follow-up care is a key part of your treatment and safety. Be sure to make and go to all appointments, and call your doctor if you are having problems. It's also a good idea to know your test results and keep a list of the medicines you take. How can you care for yourself at home? Following the DASH diet  · Eat 4 to 5 servings of fruit each day. A serving is 1 medium-sized piece of fruit, ½ cup chopped or canned fruit, 1/4 cup dried fruit, or 4 ounces (½ cup) of fruit juice. Choose fruit more often than fruit juice. · Eat 4 to 5 servings of vegetables each day. A serving is 1 cup of lettuce or raw leafy vegetables, ½ cup of chopped or cooked vegetables, or 4 ounces (½ cup) of vegetable juice. Choose vegetables more often than vegetable juice. · Get 2 to 3 servings of low-fat and fat-free dairy each day. A serving is 8 ounces of milk, 1 cup of yogurt, or 1 ½ ounces of cheese. · Eat 6 to 8 servings of grains each day.  A serving is 1 slice of bread, 1 ounce of dry cereal, or ½ cup of cooked rice, pasta, or cooked cereal. Try to choose whole-grain products as much as possible. · Limit lean meat, poultry, and fish to 2 servings each day. A serving is 3 ounces, about the size of a deck of cards. · Eat 4 to 5 servings of nuts, seeds, and legumes (cooked dried beans, lentils, and split peas) each week. A serving is 1/3 cup of nuts, 2 tablespoons of seeds, or ½ cup of cooked beans or peas. · Limit fats and oils to 2 to 3 servings each day. A serving is 1 teaspoon of vegetable oil or 2 tablespoons of salad dressing. · Limit sweets and added sugars to 5 servings or less a week. A serving is 1 tablespoon jelly or jam, ½ cup sorbet, or 1 cup of lemonade. · Eat less than 2,300 milligrams (mg) of sodium a day. If you limit your sodium to 1,500 mg a day, you can lower your blood pressure even more. Tips for success  · Start small. Do not try to make dramatic changes to your diet all at once. You might feel that you are missing out on your favorite foods and then be more likely to not follow the plan. Make small changes, and stick with them. Once those changes become habit, add a few more changes. · Try some of the following:  ? Make it a goal to eat a fruit or vegetable at every meal and at snacks. This will make it easy to get the recommended amount of fruits and vegetables each day. ? Try yogurt topped with fruit and nuts for a snack or healthy dessert. ? Add lettuce, tomato, cucumber, and onion to sandwiches. ? Combine a ready-made pizza crust with low-fat mozzarella cheese and lots of vegetable toppings. Try using tomatoes, squash, spinach, broccoli, carrots, cauliflower, and onions. ? Have a variety of cut-up vegetables with a low-fat dip as an appetizer instead of chips and dip. ? Sprinkle sunflower seeds or chopped almonds over salads. Or try adding chopped walnuts or almonds to cooked vegetables.   ? Try some vegetarian meals using beans and peas. Add garbanzo or kidney beans to salads. Make burritos and tacos with mashed roberson beans or black beans. Where can you learn more? Go to http://cassie-barrington.info/. Enter J850 in the search box to learn more about \"DASH Diet: Care Instructions. \"  Current as of: April 9, 2019  Content Version: 12.2  © 1434-9229 [employer], [employer]. Care instructions adapted under license by [employer] (which disclaims liability or warranty for this information). If you have questions about a medical condition or this instruction, always ask your healthcare professional. [address] any warranty or liability for your use of this information.

## 2020-02-12 NOTE — PROGRESS NOTES
Chief Complaint   Patient presents with   [place] Hypertension   [facility] Follow Up     she is a 60y.o. year old female who presents for follow-up of being in the ER. Patient went in for elevated BP. Patient states that she has not not been doing well with exercise or diet recently and has recently come back from vacation. She states that she can do better. Denies any chest pain shortness of breath currently. Patient states that she is taking lisinopril 20 mg and her GERD medication. She is not started metoprolol which was prescribed at [facility]. Reviewed and agree with Nurse Note and duplicated in this note. Reviewed PmHx, RxHx, FmHx, SocHx, AllgHx and updated and dated in the chart.     Family History   Problem Relation Age of Onset    Macular Degen Mother     Hypertension Father        Past Medical History:   Diagnosis Date    Headache     Hypertension     Subarachnoid hemorrhage ([address]       Social History     Socioeconomic History    Marital status:      Spouse name: Not on file    Number of children: Not on file    Years of education: Not on file    Highest education level: Not on file   Tobacco Use    Smoking status: Never Smoker    Smokeless tobacco: Never Used   Substance and Sexual Activity    Alcohol use: Yes     Comment: 2-4 drinks per week    Drug use: No    Sexual activity: Yes     Partners: Female     Birth control/protection: None        Review of Systems - negative except as listed above      Objective:     Vitals:    02/12/20 0909   BP: 134/84   Pulse: 84   Resp: 16   Temp: 98.6 °F (37 °C)   TempSrc: Oral   SpO2: 96%   Weight: 215 lb (97.5 kg)   Height: 5' 7\" (1.702 m)       Physical Examination: General appearance - alert, well appearing, and in no distress  Eyes - pupils equal and reactive, extraocular eye movements intact  Ears - bilateral TM's and external ear canals normal  Nose - normal and patent, no erythema, discharge or polyps  Mouth - mucous membranes moist, pharynx normal without lesions  Neck - supple, no significant adenopathy  Chest - clear to auscultation, no wheezes, rales or rhonchi, symmetric air entry  Heart - normal rate, regular rhythm, normal S1, S2, no murmurs, rubs, clicks or gallops  Abdomen - soft, nontender, nondistended, no masses or organomegaly  Neurological - alert, oriented, normal speech, no focal findings or movement disorder noted  Musculoskeletal - no joint tenderness, deformity or swelling  Extremities - peripheral pulses normal, no pedal edema, no clubbing or cyanosis  Skin - normal coloration and turgor, no rashes, no suspicious skin lesions noted    Assessment/ Plan:   Diagnoses and all orders for this visit:    1. Essential hypertension with goal blood pressure less than 130/80    Other orders  -     lisinopril (PRINIVIL, ZESTRIL) 40 mg tablet; Take 1 Tab by mouth daily. We will increase lisinopril to 40 mg. Patient will hold onto metoprolol but not take it currently. She likely had high blood pressure due to anxiety and stress, she will continue to work on nutrition and diet exercise. Return to clinic in 2 weeks for recheck      I have reviewed/discussed the above normal BMI with the patient. I have recommended the following interventions: dietary management education, guidance, and counseling . I have discussed the diagnosis with the patient and the intended plan as seen in the above orders. The patient has received an after-visit summary and questions were answered concerning future plans. Medication Side Effects and Warnings were discussed with patient,  Patient Labs were reviewed and or requested, and  Patient Past Records were reviewed and or requested  yes       Pt agrees to call or return to clinic and/or go to closest ER with any worsening of symptoms. This may include, but not limited to increased fever (>100.4) with NSAIDS or Tylenol, increased edema, confusion, rash, worsening of presenting symptoms.

## 2020-02-17 RX ORDER — LISINOPRIL 40 MG/1
40 TABLET ORAL DAILY
Qty: 30 TAB | Refills: 3 | Status: SHIPPED | OUTPATIENT
Start: 2020-02-17 | End: 2020-04-28

## 2020-04-27 RX ORDER — PAROXETINE HYDROCHLORIDE 40 MG/1
TABLET, FILM COATED ORAL
Qty: 90 TAB | Refills: 0 | Status: SHIPPED | OUTPATIENT
Start: 2020-04-27 | End: 2020-07-27

## 2020-04-28 RX ORDER — LISINOPRIL 40 MG/1
TABLET ORAL
Qty: 90 TAB | Refills: 1 | Status: SHIPPED | OUTPATIENT
Start: 2020-04-28 | End: 2020-10-26

## 2020-07-27 RX ORDER — PAROXETINE HYDROCHLORIDE 40 MG/1
TABLET, FILM COATED ORAL
Qty: 90 TAB | Refills: 0 | Status: SHIPPED | OUTPATIENT
Start: 2020-07-27 | End: 2021-04-23

## 2020-10-26 RX ORDER — LISINOPRIL 40 MG/1
TABLET ORAL
Qty: 90 TAB | Refills: 1 | Status: SHIPPED | OUTPATIENT
Start: 2020-10-26 | End: 2021-04-21

## 2020-11-10 ENCOUNTER — TRANSCRIBE ORDER (OUTPATIENT)
Dept: SCHEDULING | Age: 60
End: 2020-11-10

## 2020-11-10 DIAGNOSIS — Z12.31 VISIT FOR SCREENING MAMMOGRAM: Primary | ICD-10-CM

## 2020-11-11 ENCOUNTER — OFFICE VISIT (OUTPATIENT)
Dept: INTERNAL MEDICINE CLINIC | Age: 60
End: 2020-11-11
Payer: COMMERCIAL

## 2020-11-11 VITALS
OXYGEN SATURATION: 97 % | BODY MASS INDEX: 34.97 KG/M2 | TEMPERATURE: 97.9 F | DIASTOLIC BLOOD PRESSURE: 88 MMHG | HEART RATE: 72 BPM | HEIGHT: 67 IN | WEIGHT: 222.8 LBS | SYSTOLIC BLOOD PRESSURE: 134 MMHG | RESPIRATION RATE: 14 BRPM

## 2020-11-11 DIAGNOSIS — M25.562 ACUTE PAIN OF LEFT KNEE: Primary | ICD-10-CM

## 2020-11-11 PROCEDURE — 99214 OFFICE O/P EST MOD 30 MIN: CPT | Performed by: FAMILY MEDICINE

## 2020-11-11 PROCEDURE — 73564 X-RAY EXAM KNEE 4 OR MORE: CPT | Performed by: FAMILY MEDICINE

## 2020-11-11 RX ORDER — NAPROXEN 500 MG/1
500 TABLET ORAL 2 TIMES DAILY WITH MEALS
Qty: 30 TAB | Refills: 0 | Status: SHIPPED | OUTPATIENT
Start: 2020-11-11

## 2020-11-11 NOTE — PROGRESS NOTES
No chief complaint on file. she is a 63y.o. year old female who presents for evaluation of left knee injury  Pain Assessment Encounter      Gretta Sherwood  11/11/2020  Onset of Symptoms: 2.5 weeks  ________________________________________________________________________  Description:Twisted knee during camping    Frequency: once a day  Pain Scale:(1-10): 3  Trauma Hx: twisted during camping  Hx of similar symptoms: No:   Radiation: NO, knee  Duration:  intermittent      Progression: is unchanged  What makes it better?: rest  What makes it worse?:strecthing and walking  Medications tried: acetaminophen, ibuprofen    Reviewed and agree with Nurse Note and duplicated in this note. Reviewed PmHx, RxHx, FmHx, SocHx, AllgHx and updated and dated in the chart.     Family History   Problem Relation Age of Onset    Macular Degen Mother     Hypertension Father        Past Medical History:   Diagnosis Date    Headache     Hypertension     Subarachnoid hemorrhage ([facility] [address]       Social History     Socioeconomic History    Marital status:      Spouse name: Not on file    Number of children: Not on file    Years of education: Not on file    Highest education level: Not on file   Tobacco Use    Smoking status: Never Smoker    Smokeless tobacco: Never Used   Substance and Sexual Activity    Alcohol use: Yes     Comment: 2-4 drinks per week    Drug use: No    Sexual activity: Yes     Partners: Female     Birth control/protection: None        Review of Systems - negative except as listed above      Objective:     Vitals:    11/11/20 1041   BP: 134/88   Pulse: 72   Resp: 14   Temp: 97.9 °F (36.6 °C)   TempSrc: Oral   SpO2: 97%   Weight: 222 lb 12.8 oz (101.1 kg)   Height: 5' 7\" (1.702 m)       Physical Examination: General appearance - alert, well appearing, and in no distress  Back exam - full range of motion, no tenderness, palpable spasm or pain on motion  Neurological - alert, oriented, normal speech, no focal findings or movement disorder noted  Musculoskeletal - left knee exam:  The patient'sright Knee  is  normal to inspection. The ROM is normal and there is flexion to Normal Effusion is: mild The joint exhibits Negative warmth and Crepitus is: absent. The Keon test is:  positive Joint Line Tenderness is  positive medial.  The Thessaly Test is not tested  and the Lachman is  negative. The Anterior Drawer is negative. The Posterior Drawer is:  negative. Valgus Stress (for MCL) is:  normal . Varus Stress (for LCL) is  normal  . The Lawanda Test is negative and the Apprehension Sign:  negative  Patellar Grind negative           Extremities - peripheral pulses normal, no pedal edema, no clubbing or cyanosis  Skin - normal coloration and turgor, no rashes, no suspicious skin lesions noted   Indications for study: left knee pain  Limited musculoskeletal ultrasound examination was performed on the anterior left knee with the following findings: Quadriceps tendon - long:  normal echogenic, linearly compact fibrillar appearance   Quadriceps tendon - trans:  normal echogenic, tessellate compact fibrillar pancake-like appearance   Suprapatellar recess - long: no effusion; normal appearing suprapatellar fat pads   Patellar tendon - long:  normal echogenic, linearly compact fibrillar appearance; normal appearing Hoffa fat pad  Patellar tendon - trans:  normal echogenic, tessellate compact fibrillar pancake-like appearance     Impression: Normal anterior knee, slight hypoechoic fluid under MCL proximal to the medial meniscus with no obvious tearing of the meniscus    Scanned and Interpreted by Melba Mckoy MD [facility]      Assessment/ Plan:   Diagnoses and all orders for this visit:    1.  Acute pain of left knee  -     XR KNEE LT MIN 4 V; Future  -     AMB POC US,EXTREMITY,NONVASCULAR,REAL-TIME IMAGE,LIMITED    Medial meniscus versus MCL tear    Pathophysiology, recovery and rehabilitation process discussed and questions answered   Counseling for 30 Minutes of the total visit duration   Pictures and figures used as necessary   Provided reassurance   Monitor response to Physical Therapy   Recommend activity modification   Recommend  lower impact activities-walking, Eliptical, Nordic Track, cycling or swimming              1) Remember to stay active and/or exercise regularly (I suggest 30-45 minutes daily)   2) For reliable dietary information, go to www. EATRIGHT.org. You may wish to consider seeing the nutritionist at [facility] [phone], also consider the [address] [facility]. I have discussed the diagnosis with the patient and the intended plan as seen in the above orders. The patient has received an after-visit summary and questions were answered concerning future plans. Medication Side Effects and Warnings were discussed with patient,  Patient Labs were reviewed and or requested, and  Patient Past Records were reviewed and or requested  yes      Pt agrees to call or return to clinic and/or go to closest ER with any worsening of symptoms. This may include, but not limited to increased fever (>100.4) with NSAIDS or Tylenol, increased edema, confusion, rash, worsening of presenting symptoms. Please note that this dictation was completed with Mindie, the [employer] voice recognition software. Quite often unanticipated grammatical, syntax, homophones, and other interpretive errors are inadvertently transcribed by the computer software. Please disregard these errors. Please excuse any errors that have escaped final proofreading. Thank you.

## 2020-11-17 ENCOUNTER — HOSPITAL ENCOUNTER (OUTPATIENT)
Dept: MAMMOGRAPHY | Age: 60
Discharge: HOME | End: 2020-11-17
Attending: FAMILY MEDICINE
Payer: COMMERCIAL

## 2020-11-17 DIAGNOSIS — Z12.31 VISIT FOR SCREENING MAMMOGRAM: ICD-10-CM

## 2020-11-17 PROCEDURE — 77067 SCR MAMMO BI INCL CAD: CPT

## 2020-11-30 ENCOUNTER — HOSPITAL ENCOUNTER (OUTPATIENT)
Dept: PHYSICAL THERAPY | Age: 60
Discharge: HOME | End: 2020-11-30
Payer: COMMERCIAL

## 2020-11-30 DIAGNOSIS — M25.562 ACUTE PAIN OF LEFT KNEE: ICD-10-CM

## 2020-11-30 PROCEDURE — 97110 THERAPEUTIC EXERCISES: CPT | Performed by: PHYSICAL THERAPIST

## 2020-11-30 PROCEDURE — 97161 PT EVAL LOW COMPLEX 20 MIN: CPT | Performed by: PHYSICAL THERAPIST

## 2020-11-30 PROCEDURE — 97016 VASOPNEUMATIC DEVICE THERAPY: CPT | Performed by: PHYSICAL THERAPIST

## 2020-11-30 NOTE — PROGRESS NOTES
Physical Therapy at [facility],   a part of [address] [place], [place] [address] [address], [facility]  Phone: [phone]  Fax: [phone]    Plan of Care/Statement of Necessity for Physical Therapy Services  2-15    Patient name: Rupal Mansfield  : 1960  Provider#: [id number]  Referral source: Fadia Liu MD      Medical/Treatment Diagnosis: Acute pain of left knee [M25.562]     Prior Hospitalization: see medical history     Comorbidities: OA  Prior Level of Function: Independent  Medications: Verified on Patient Summary List    Start of Care:       Onset Date: 2020       The Plan of Care and following information is based on the information from the initial evaluation. Assessment/ key information: Patient is a 63 y.o female presenting with L medial knee pain s/p camping injury She is WNL for all LE ROM, though reports pain with passive overpressure into knee flexion. Patient demonstrates grossly 4+/5 muscle strength on the left compared to 5/5 for all tested muscle groups on the left. She is tender to palpation form the L medial femoral condyle to medial tibial condyle. She is painful with valgus stress. She denies joint line tenderness and is negative for special tests targeting the medial meniscus. Symptoms are consistent with MCL sprain with potential meniscal involvement.       Evaluation Complexity History LOW Complexity : Zero comorbidities / personal factors that will impact the outcome / POC; Examination LOW Complexity : 1-2 Standardized tests and measures addressing body structure, function, activity limitation and / or participation in recreation  ;Presentation LOW Complexity : Stable, uncomplicated  ;Clinical Decision Making MEDIUM Complexity : FOTO score of 26-74  Overall Complexity Rating: LOW     Problem List: pain affecting function, decrease strength, decrease ADL/ functional abilitiies and decrease activity tolerance   Treatment Plan may include any combination of the following: Therapeutic exercise, Therapeutic activities, Physical agent/modality, Gait/balance training, Manual therapy and Patient education  Patient / Family readiness to learn indicated by: asking questions, trying to perform skills and interest  Persons(s) to be included in education: patient (P)  Barriers to Learning/Limitations: None  Patient Goal (s): Help to heal the injury, relieve pain.   Patient Self Reported Health Status: fair  Rehabilitation Potential: excellent    Short Term Goals: To be accomplished in 3 weeks:   Patient will not experience an increase in s/s with transitional movements   Patient will report a subjective reduction in morning joint stiffness   Patient will be able to walk her dog for 10 minutes without an increas in s/s  Long Term Goals: To be accomplished in 6 weeks:   Patient will be able to use the elliptical  for 20 minutes without an increase in s/s   Patient will be able to walk 1.5 miles without an increase in s/s  Frequency / Duration: Patient to be seen 2 times per week for 6 weeks. Patient/ Caregiver education and instruction: activity modification and exercises    [x]  Plan of care has been reviewed with RAHAT Quintanilla, SPT 11/30/2020     ________________________________________________________________________    I certify that the above Therapy Services are being furnished while the patient is under my care. I agree with the treatment plan and certify that this therapy is necessary.     [de-identified] Signature:____________________  Date:____________Time: _________

## 2020-11-30 NOTE — PROGRESS NOTES
PT INITIAL EVALUATION NOTE 2-15    Patient Name: Katia Andrews  Date:2020  : 1960  [x]  Patient  Verified  Payor: BLUE CROSS / Plan: [facility] [place] / Product Type: PPO /    In time:2:30  Out time: 3:45  Total Treatment Time (min): 30  Visit #: 1     Treatment Area: Acute pain of left knee [M25.562]    SUBJECTIVE  Pain Level (0-10 scale): 2  Any medication changes, allergies to medications, adverse drug reactions, diagnosis change, or new procedure performed?: [] No    [x] Yes (see summary sheet for update)  Subjective:   Patient is a 63year old female presenting with L medial knee pain. She twisted her knee on a camping trip in mid October and has been painful since. Pain ranges from 2-7/10. She experiences discomfort with certain movement, particularly twisting and turning. She wakes up with her knee incredibly stiff. Patient has experienced some slight swelling but nothing that persists. Ice and elevation tend to make her feel better. Since the injury, patient has followed [facility] to treat herself. She has a 8month-old puppy and yard work that required her to stay moving. PLOF: Independent - walking  Mechanism of Injury: Torsional force at the knee  Previous Treatment/Compliance: None for current injury  PMHx/Surgical Hx: Head injury in 2018 - PT with [employer]  Work Hx: Retired  Living Situation: Independent, lives with wife and dog  Pt Goals: Help the healing process, return to walking  Barriers: None  Motivation: Excellent  Substance use: not reported   FABQ Score:   Cognition: A & O x 4        OBJECTIVE/EXAMINATION  Posture:  Unremarkable  Other Observations:    Gait and Functional Mobility:  Patient walks without significant gait deviations. Some pain noted in popliteal region with standing toe raise   Palpation: TTP over medial femoral condyle, MCL, Medial joint capsule    L Knee AROM WNL PROM WNL - p!  With overpressure    Other LE ROM WNL    Flexibility: LE flexibility is unrestricted        LOWER QUARTER   MUSCLE STRENGTH  KEY       R  L  0 - No Contraction  Knee ext  5  4+  1 - Trace            flex  5  4+  2 - Poor   Hip ext   NT  NT  3 - Fair          flex   5  4+  4 - Good         abd  5  4+  5 - Normal         add  NT  NT      Ankle DF  >3  >3                PF  5  5                     Neurological: Reflexes / Sensations: normal  Special Tests:                        H.S. SLR: -        Knee Valgus Stress: +        Thessaly's -      Keon -      Modality rationale: decrease edema and decrease pain to improve the patients ability to perform ADLs   Min Type Additional Details    [] Estim: []Att   []Unatt        []TENS instruct                  []IFC  []Premod   []NMES                     []Other:  []w/US   []w/ice   []w/heat  Position:  Location:    []  Traction: [] Cervical       []Lumbar                       [] Prone          []Supine                       []Intermittent   []Continuous Lbs:  [] before manual  [] after manual  []w/heat    []  Ultrasound: []Continuous   [] Pulsed at:                            []1MHz   []3MHz Location:  W/cm2:    []  Paraffin         Location:  []w/heat    []  Ice     []  Heat  []  Ice massage Position:  Location:    []  Laser  []  Other: Position:  Location:   15 [x]  Vasopneumatic Device Pressure:       [] lo [x] med [] hi   Temperature:    [x] Skin assessment post-treatment:  [x]intact []redness- no adverse reaction    []redness - adverse reaction:     10 min Therapeutic Exercise:  [] See flow sheet :   Rationale: increase strength and increase proprioception to improve the patients ability to perform ADLs and recreation activities        5 min Manual Therapy:  Cross Friction to MCL   Rationale: increase tissue extensibility  to improve the patients ability to Perform therapeutic exercise              With   [] TE   [] TA   [] neuro   [] other: Patient Education: [x] Review HEP    [] Progressed/Changed HEP based on:   [] positioning [] body mechanics   [] transfers   [] heat/ice application    [] other:        Other Objective/Functional Measures: Mid-patellar girth measurement: L 40cm, R, 38.5cm    Pain Level (0-10 scale) post treatment: 2      ASSESSMENT:      [x]  See Plan of Care      Kenya Lynch 11/30/2020

## 2020-12-04 ENCOUNTER — APPOINTMENT (OUTPATIENT)
Dept: PHYSICAL THERAPY | Age: 60
End: 2020-12-04
Payer: COMMERCIAL

## 2020-12-08 ENCOUNTER — HOSPITAL ENCOUNTER (OUTPATIENT)
Dept: PHYSICAL THERAPY | Age: 60
Discharge: HOME | End: 2020-12-08
Payer: COMMERCIAL

## 2020-12-08 PROCEDURE — 97016 VASOPNEUMATIC DEVICE THERAPY: CPT | Performed by: PHYSICAL THERAPIST

## 2020-12-08 PROCEDURE — 97140 MANUAL THERAPY 1/> REGIONS: CPT | Performed by: PHYSICAL THERAPIST

## 2020-12-08 PROCEDURE — 97110 THERAPEUTIC EXERCISES: CPT | Performed by: PHYSICAL THERAPIST

## 2020-12-08 NOTE — PROGRESS NOTES
PT DAILY TREATMENT NOTE 2-15    Patient Name: Ronaldo Mckeon  Date:2020  : 1960  [x]  Patient  Verified  Payor: BLUE CROSS / Plan: [employer] [facility] / Product Type: PPO /    In time: 8:30a  Out time:9:30a  Total Treatment Time (min): 60  Visit #:  2    Treatment Area: Right knee pain [M25.561]    SUBJECTIVE  Pain Level (0-10 scale): 0  Any medication changes, allergies to medications, adverse drug reactions, diagnosis change, or new procedure performed?: [x] No    [] Yes (see summary sheet for update)  Subjective functional status/changes:   [] No changes reported  Patient reports that she has experienced an increase in stiffness in the morning after sleep and in the evening. She feels that she has been a little more sore from the exercises she is doing at home.     OBJECTIVE    Modality rationale: decrease edema, decrease inflammation and decrease pain to improve the patients ability to perform ADLs   Min Type Additional Details       [] Estim: []Att   []Unatt    []TENS instruct                  []IFC  []Premod   []NMES                     []Other:  []w/US   []w/ice   []w/heat  Position:  Location:       []  Traction: [] Cervical       []Lumbar                       [] Prone          []Supine                       []Intermittent   []Continuous Lbs:  [] before manual  [] after manual  []w/heat    []  Ultrasound: []Continuous   [] Pulsed                       at: []1MHz   []3MHz Location:  W/cm2:    [] Paraffin         Location:   []w/heat    []  Ice     []  Heat  []  Ice massage Position:  Location:    []  Laser  []  Other: Position:  Location:     15 [x]  Vasopneumatic Device Pressure:       [] lo [x] med [] hi   Temperature: 34     [x] Skin assessment post-treatment:  [x]intact []redness- no adverse reaction    []redness  adverse reaction:         25 min Therapeutic Exercise:  [] See flow sheet :   Rationale: increase ROM, increase strength and increase proprioception to improve the patients ability to perform ADLs        20 min Manual Therapy:  Seated distraction, STM to distal adductors, HS, MCL  Kinesio-taping to medial knee   Rationale: decrease pain and increase postural awareness  to improve the patients ability to perform ADLs              With   [] TE   [] TA   [] neuro   [] other: Patient Education: [x] Review HEP    [] Progressed/Changed HEP based on:   [] positioning   [] body mechanics   [] transfers   [] heat/ice application    [] other:      Other Objective/Functional Measures:      Pain Level (0-10 scale) post treatment: 0    ASSESSMENT/Changes in Function:   Patient tolerated total gym squats at level 20 without an increase in pain. She reported that a deep bend felt good. Patient performed sidestepping with yellow theraband. Suggested patient attempt once at home before her next appointment. Patient will continue to benefit from skilled PT services to modify and progress therapeutic interventions, address functional mobility deficits, address ROM deficits, address strength deficits, analyze and address soft tissue restrictions, analyze and cue movement patterns and analyze and modify body mechanics/ergonomics to attain remaining goals. Treatment was performed with direct supervision by Nura Hein, PT,  DPT. []  See Plan of Care  []  See progress note/recertification  []  See Discharge Summary         Progress towards goals / Updated goals:  Short Term Goals: To be accomplished in 3 weeks:               Patient will not experience an increase in s/s with transitional movements               Patient will report a subjective reduction in morning joint stiffness               Patient will be able to walk her dog for 10 minutes without an increas in s/s  Long Term Goals:  To be accomplished in 6 weeks:               Patient will be able to use the elliptical  for 20 minutes without an increase in s/s               Patient will be able to walk 1.5 miles without an increase in s/s  Frequency / Duration: Patient to be seen 2 times per week for 6 weeks.   PLAN  [x]  Upgrade activities as tolerated     [x]  Continue plan of care  []  Update interventions per flow sheet       []  Discharge due to:_  []  Other:_      Cyn Morales, KIZZY 12/8/2020

## 2020-12-11 ENCOUNTER — HOSPITAL ENCOUNTER (OUTPATIENT)
Dept: PHYSICAL THERAPY | Age: 60
Discharge: HOME | End: 2020-12-11
Payer: COMMERCIAL

## 2020-12-11 ENCOUNTER — OFFICE VISIT (OUTPATIENT)
Dept: INTERNAL MEDICINE CLINIC | Age: 60
End: 2020-12-11
Payer: COMMERCIAL

## 2020-12-11 VITALS
WEIGHT: 223.5 LBS | RESPIRATION RATE: 14 BRPM | TEMPERATURE: 98.5 F | OXYGEN SATURATION: 98 % | HEART RATE: 75 BPM | SYSTOLIC BLOOD PRESSURE: 154 MMHG | HEIGHT: 67 IN | DIASTOLIC BLOOD PRESSURE: 97 MMHG | BODY MASS INDEX: 35.08 KG/M2

## 2020-12-11 DIAGNOSIS — S83.412D SPRAIN OF MEDIAL COLLATERAL LIGAMENT OF LEFT KNEE, SUBSEQUENT ENCOUNTER: Primary | ICD-10-CM

## 2020-12-11 DIAGNOSIS — E66.01 SEVERE OBESITY (HCC): ICD-10-CM

## 2020-12-11 PROCEDURE — 97140 MANUAL THERAPY 1/> REGIONS: CPT | Performed by: PHYSICAL THERAPIST

## 2020-12-11 PROCEDURE — 97016 VASOPNEUMATIC DEVICE THERAPY: CPT | Performed by: PHYSICAL THERAPIST

## 2020-12-11 PROCEDURE — 99214 OFFICE O/P EST MOD 30 MIN: CPT | Performed by: FAMILY MEDICINE

## 2020-12-11 PROCEDURE — 97110 THERAPEUTIC EXERCISES: CPT | Performed by: PHYSICAL THERAPIST

## 2020-12-11 NOTE — PROGRESS NOTES
PT DAILY TREATMENT NOTE 2-15    Patient Name: Juan Carlos Lees  Date:2020  : 1960  [x]  Patient  Verified  Payor: BLUE CROSS / Plan: [employer] [facility] [place] / Product Type: PPO /    In time: 9:15a  Out time:10:23a  Total Treatment Time (min): 60  Visit #:  3    Treatment Area: Right knee pain [M25.561]    SUBJECTIVE  Pain Level (0-10 scale): 0  Any medication changes, allergies to medications, adverse drug reactions, diagnosis change, or new procedure performed?: [x] No    [] Yes (see summary sheet for update)  Subjective functional status/changes:   [] No changes reported  Patient reports feeling comfortable with the exercises she is performing at home. She felt especially challenged by resisted side stepping. She felt that the kinesio tape was very helpful and has purchased some for home use.     OBJECTIVE    Modality rationale: decrease edema, decrease inflammation and decrease pain to improve the patients ability to perform ADLs   Min Type Additional Details       [] Estim: []Att   []Unatt    []TENS instruct                  []IFC  []Premod   []NMES                     []Other:  []w/US   []w/ice   []w/heat  Position:  Location:       []  Traction: [] Cervical       []Lumbar                       [] Prone          []Supine                       []Intermittent   []Continuous Lbs:  [] before manual  [] after manual  []w/heat    []  Ultrasound: []Continuous   [] Pulsed                       at: []1MHz   []3MHz Location:  W/cm2:    [] Paraffin         Location:   []w/heat    []  Ice     []  Heat  []  Ice massage Position:  Location:    []  Laser  []  Other: Position:  Location:     15 [x]  Vasopneumatic Device Pressure:       [] lo [x] med [] hi   Temperature: 34     [x] Skin assessment post-treatment:  [x]intact []redness- no adverse reaction    []redness  adverse reaction:         33 min Therapeutic Exercise:  [] See flow sheet :   Rationale: increase ROM, increase strength and increase proprioception to improve the patients ability to perform ADLs        20 min Manual Therapy:  STM to distal adductors, HS, MCL     Rationale: decrease pain and increase postural awareness  to improve the patients ability to perform ADLs              With   [] TE   [] TA   [] neuro   [] other: Patient Education: [x] Review HEP    [] Progressed/Changed HEP based on:   [] positioning   [] body mechanics   [] transfers   [] heat/ice application    [] other:      Other Objective/Functional Measures:      Pain Level (0-10 scale) post treatment: 0    ASSESSMENT/Changes in Function:   Patient felt pain in her L knee while performing sidelying clamshells. Switched to single knee fallouts which felt more comfortable. Patient remains very tender to touch in the adductors and over the pes anserine. Educated patient on gentle adductor stretch to perform at home. Patient will continue to benefit from skilled PT services to modify and progress therapeutic interventions, address functional mobility deficits, address ROM deficits, address strength deficits, analyze and address soft tissue restrictions, analyze and cue movement patterns and analyze and modify body mechanics/ergonomics to attain remaining goals. Treatment was performed with direct supervision by Jatin Carpenter, PT,  DPT. []  See Plan of Care  []  See progress note/recertification  []  See Discharge Summary         Progress towards goals / Updated goals:  Short Term Goals: To be accomplished in 3 weeks:               Patient will not experience an increase in s/s with transitional movements               Patient will report a subjective reduction in morning joint stiffness               Patient will be able to walk her dog for 10 minutes without an increas in s/s  Long Term Goals:  To be accomplished in 6 weeks:               Patient will be able to use the elliptical  for 20 minutes without an increase in s/s               Patient will be able to walk 1.5 miles without an increase in s/s  Frequency / Duration: Patient to be seen 2 times per week for 6 weeks.   PLAN  [x]  Upgrade activities as tolerated     [x]  Continue plan of care  []  Update interventions per flow sheet       []  Discharge due to:_  []  Other:_      Cesar Cano, SPT 12/11/2020

## 2020-12-11 NOTE — PROGRESS NOTES
No chief complaint on file. she is a 63y.o. year old female who presents for follow up of injury. Follow Up Pain Assessment Encounter      Onset of Symptoms: 1.5 months  ________________________________________________________________________  Description: Pain is improved      Pain Scale:(1-10): 2  Duration:  intermittent  Radiation: knee  What makes it better?: exercise and ice  What makes it worse?: Stiff after sleep  Medications tried: aleve  Modalities tried: PT        Reviewed and agree with Nurse Note and duplicated in this note. Reviewed PmHx, RxHx, FmHx, SocHx, AllgHx and updated and dated in the chart. Family History   Problem Relation Age of Onset    Macular Degen Mother     Hypertension Father        Past Medical History:   Diagnosis Date    Headache     Hypertension     Subarachnoid hemorrhage ([facility] [address]       Social History     Socioeconomic History    Marital status:      Spouse name: Not on file    Number of children: Not on file    Years of education: Not on file    Highest education level: Not on file   Tobacco Use    Smoking status: Never Smoker    Smokeless tobacco: Never Used   Substance and Sexual Activity    Alcohol use: Yes     Comment: 2-4 drinks per week    Drug use: No    Sexual activity: Yes     Partners: Female     Birth control/protection: None        Review of Systems - negative except as listed above      Objective:     Vitals:    12/11/20 1037   BP: (!) 154/97   Pulse: 75   Resp: 14   Temp: 98.5 °F (36.9 °C)   TempSrc: Oral   SpO2: 98%   Weight: 223 lb 8 oz (101.4 kg)   Height: 5' 7\" (1.702 m)       Physical Examination: General appearance - alert, well appearing, and in no distress  Back exam - full range of motion, no tenderness, palpable spasm or pain on motion  Neurological - alert, oriented, normal speech, no focal findings or movement disorder noted  Musculoskeletal - left knee exam:  The patient'sright Knee  is  normal to inspection.   The ROM is normal and there is flexion to Normal Effusion is: mild The joint exhibits Negative warmth and Crepitus is: absent. The Keon test is:  positive Joint Line Tenderness is  positive medial.  The Thessaly Test is not tested  and the Lachman is  negative. The Anterior Drawer is negative. The Posterior Drawer is:  negative. Valgus Stress (for MCL) is:  normal . Varus Stress (for LCL) is  normal  . The Lawanda Test is negative and the Apprehension Sign:  negative  Patellar Grind negative     Extremities - peripheral pulses normal, no pedal edema, no clubbing or cyanosis  Skin - normal coloration and turgor, no rashes, no suspicious skin lesions noted      Assessment/ Plan:   Diagnoses and all orders for this visit:    1. Sprain of medial collateral ligament of left knee, subsequent encounter    2. Severe obesity ([address]    Patient will continue physical therapy, she is improving well. If she stalls we will consider MRI, virtual visit in 3 weeks      Pathophysiology, recovery and rehabilitation process discussed and questions answered   Counseling for 30 Minutes of the total visit duration   Pictures and figures used as necessary   Provided reassurance   Recommend  lower impact activities-walking, Eliptical, Nordic Track, cycling or swimming   Follow up in 4 week(s)      I have discussed the diagnosis with the patient and the intended plan as seen in the above orders. The patient has received an after-visit summary and questions were answered concerning future plans. Medication Side Effects and Warnings were discussed with patient,  Patient Labs were reviewed and or requested, and  Patient Past Records were reviewed and or requested  yes     Pt agrees to call or return to clinic and/or go to closest ER with any worsening of symptoms. This may include, but not limited to increased fever (>100.4) with NSAIDS or Tylenol, increased edema, confusion, rash, worsening of presenting symptoms.     Please note that this dictation was completed with [employer], the [employer] voice recognition software. Quite often unanticipated grammatical, syntax, homophones, and other interpretive errors are inadvertently transcribed by the computer software. Please disregard these errors. Please excuse any errors that have escaped final proofreading. Thank you.

## 2020-12-11 NOTE — PROGRESS NOTES
Physical Therapy at [facility],   a part of [facility]  [address] [address], [address] [place] [address], [address] [place], [place] [address]  Phone: [phone] Fax: [phone]    Progress Note    Name: Coco Schwartz   : 1960   MD: Leopold Phenix, MD       Treatment Diagnosis: Left knee pain  Start of Care:     Visits from Start of Care: 3  Missed Visits: 0    Summary of Care: Thank you for your referral of Ms. Eduarda Godwin for her Left knee pain. She has been seen for a total of 3 PT sessions and is gradually making some improvement. We have initiated open and closed chain strengthening, ROM and manual therapy. She has responded well to therapy thus far, and reports feeling better after physical therapy visits. She felt a benefit from medial knee KT taping as well. She is compliant with her home exercise program and is showing improved muscular strength and endurance. Please advise on any further course of action you would like us to take with Ms. Eduarda Godwin. Treatment was performed with direct supervision by Audrey Frankel, PT,  DPT. Assessment / Recommendations: We recommend that the patient continue with physical therapy to address the following goals:    Short Term Goals: To be accomplished in 3 weeks:               Patient will not experience an increase in s/s with transitional movements               Patient will report a subjective reduction in morning joint stiffness               Patient will be able to walk her dog for 10 minutes without an increas in s/s  Long Term Goals: To be accomplished in 6 weeks:               Patient will be able to use the elliptical  for 20 minutes without an increase in s/s               Patient will be able to walk 1.5 miles without an increase in s/s        Adelina [place] 2020     ________________________________________________________________________  NOTE TO PHYSICIAN:  Please complete the following and fax to:   [facility]: Fax: [phone] . Jeannette Michael Retain this original for your records. If you are unable to process this request in 24 hours, please contact our office.        ____ I have read the above report and request that my patient continue therapy with the following changes/special instructions:  ____ I have read the above report and request that my patient be discharged from therapy    Physician's Signature:_________________ Date:___________Time:__________

## 2020-12-14 ENCOUNTER — HOSPITAL ENCOUNTER (OUTPATIENT)
Dept: PHYSICAL THERAPY | Age: 60
Discharge: HOME | End: 2020-12-14
Payer: COMMERCIAL

## 2020-12-14 PROCEDURE — 97110 THERAPEUTIC EXERCISES: CPT | Performed by: PHYSICAL THERAPIST

## 2020-12-14 PROCEDURE — 97140 MANUAL THERAPY 1/> REGIONS: CPT | Performed by: PHYSICAL THERAPIST

## 2020-12-14 NOTE — PROGRESS NOTES
PT DAILY TREATMENT NOTE 2-15    Patient Name: Zurdo Guajardo  Date:2020  : 1960  [x]  Patient  Verified  Payor: BLUE CROSS / Plan: [facility] [facility] / Product Type: PPO /    In time: 1145a  Out time:1230p  Total Treatment Time (min): 45  Visit #:  4    Treatment Area: Right knee pain [M25.561]    SUBJECTIVE  Pain Level (0-10 scale): 0  Any medication changes, allergies to medications, adverse drug reactions, diagnosis change, or new procedure performed?: [x] No    [] Yes (see summary sheet for update)  Subjective functional status/changes:   [] No changes reported  Patient reports feeling pretty good, less pain today.      OBJECTIVE    Modality rationale: decrease edema, decrease inflammation and decrease pain to improve the patients ability to perform ADLs   Min Type Additional Details       [] Estim: []Att   []Unatt    []TENS instruct                  []IFC  []Premod   []NMES                     []Other:  []w/US   []w/ice   []w/heat  Position:  Location:       []  Traction: [] Cervical       []Lumbar                       [] Prone          []Supine                       []Intermittent   []Continuous Lbs:  [] before manual  [] after manual  []w/heat    []  Ultrasound: []Continuous   [] Pulsed                       at: []1MHz   []3MHz Location:  W/cm2:    [] Paraffin         Location:   []w/heat    []  Ice     []  Heat  []  Ice massage Position:  Location:    []  Laser  []  Other: Position:  Location:     n/t [x]  Vasopneumatic Device Pressure:       [] lo [x] med [] hi   Temperature: 34     [x] Skin assessment post-treatment:  [x]intact []redness- no adverse reaction    []redness  adverse reaction:         30 min Therapeutic Exercise:  [] See flow sheet : HS stretch   Rationale: increase ROM, increase strength and increase proprioception to improve the patients ability to perform ADLs        15 min Manual Therapy:  STM to distal adductors, HS, MCL, PFJ mobilizations     Rationale: decrease pain and increase postural awareness  to improve the patients ability to perform ADLs              With   [] TE   [] TA   [] neuro   [] other: Patient Education: [x] Review HEP    [] Progressed/Changed HEP based on:   [] positioning   [] body mechanics   [] transfers   [] heat/ice application    [] other:      Other Objective/Functional Measures:      Pain Level (0-10 scale) post treatment: 0    ASSESSMENT/Changes in Function:   Able to progress to SLS, rocker board and added red band to side stepping. Patient will continue to benefit from skilled PT services to modify and progress therapeutic interventions, address functional mobility deficits, address ROM deficits, address strength deficits, analyze and address soft tissue restrictions, analyze and cue movement patterns and analyze and modify body mechanics/ergonomics to attain remaining goals. Treatment was performed with direct supervision by Quoc Vines PT,  DPT. []  See Plan of Care  []  See progress note/recertification  []  See Discharge Summary         Progress towards goals / Updated goals:  Short Term Goals: To be accomplished in 3 weeks:               Patient will not experience an increase in s/s with transitional movements               Patient will report a subjective reduction in morning joint stiffness               Patient will be able to walk her dog for 10 minutes without an increas in s/s  Long Term Goals: To be accomplished in 6 weeks:               Patient will be able to use the elliptical  for 20 minutes without an increase in s/s               Patient will be able to walk 1.5 miles without an increase in s/s  Frequency / Duration: Patient to be seen 2 times per week for 6 weeks.   PLAN  [x]  Upgrade activities as tolerated     [x]  Continue plan of care  []  Update interventions per flow sheet       []  Discharge due to:_  []  Other:_      Quoc Vines PT, DPT, SPT 12/14/2020

## 2020-12-17 ENCOUNTER — APPOINTMENT (OUTPATIENT)
Dept: PHYSICAL THERAPY | Age: 60
End: 2020-12-17
Payer: COMMERCIAL

## 2020-12-18 ENCOUNTER — HOSPITAL ENCOUNTER (OUTPATIENT)
Dept: PHYSICAL THERAPY | Age: 60
Discharge: HOME | End: 2020-12-18
Payer: COMMERCIAL

## 2020-12-18 PROCEDURE — 97016 VASOPNEUMATIC DEVICE THERAPY: CPT | Performed by: PHYSICAL THERAPIST

## 2020-12-18 PROCEDURE — 97035 APP MDLTY 1+ULTRASOUND EA 15: CPT | Performed by: PHYSICAL THERAPIST

## 2020-12-18 PROCEDURE — 97110 THERAPEUTIC EXERCISES: CPT | Performed by: PHYSICAL THERAPIST

## 2020-12-18 NOTE — PROGRESS NOTES
PT DAILY TREATMENT NOTE 2-15    Patient Name: Stefano Bocanegra  Date:2020  : 1960  [x]  Patient  Verified  Payor: BLUE CROSS / Plan: [facility] [facility] / Product Type: PPO /    In time: 1145a  Out time:1245p  Total Treatment Time (min): 60  Visit #:  5    Treatment Area: Right knee pain [M25.561]    SUBJECTIVE  Pain Level (0-10 scale): 3/10  Any medication changes, allergies to medications, adverse drug reactions, diagnosis change, or new procedure performed?: [x] No    [] Yes (see summary sheet for update)  Subjective functional status/changes:   [] No changes reported  Patient reports she overdid it on Wednesday, walked 1 mile ont he treadmill at 3.0 at her parents house and biked before and felt increased soreness the following day.     OBJECTIVE    Modality rationale: decrease edema, decrease inflammation and decrease pain to improve the patients ability to perform ADLs   Min Type Additional Details       [] Estim: []Att   []Unatt    []TENS instruct                  []IFC  []Premod   []NMES                     []Other:  []w/US   []w/ice   []w/heat  Position:  Location:       []  Traction: [] Cervical       []Lumbar                       [] Prone          []Supine                       []Intermittent   []Continuous Lbs:  [] before manual  [] after manual  []w/heat   8 [x]  Ultrasound: []Continuous   [x] Pulsed                       at: []1MHz   [x]3MHz Location:MCL  W/cm2:1.4    [] Paraffin         Location:   []w/heat    []  Ice     []  Heat  []  Ice massage Position:  Location:    []  Laser  []  Other: Position:  Location:     15 [x]  Vasopneumatic Device Pressure:       [] lo [x] med [] hi   Temperature: 34     [x] Skin assessment post-treatment:  [x]intact []redness- no adverse reaction    []redness  adverse reaction:         38 min Therapeutic Exercise:  [] See flow sheet : HS stretch   Rationale: increase ROM, increase strength and increase proprioception to improve the patients ability to perform ADLs        7 min Manual Therapy:  STM to distal adductors, HS, MCL, PFJ mobilizations     Rationale: decrease pain and increase postural awareness  to improve the patients ability to perform ADLs              With   [] TE   [] TA   [] neuro   [] other: Patient Education: [x] Review HEP    [] Progressed/Changed HEP based on:   [] positioning   [] body mechanics   [] transfers   [] heat/ice application    [] other:      Other Objective/Functional Measures:      Pain Level (0-10 scale) post treatment: stiff    ASSESSMENT/Changes in Function:   Did not progress exercises today due to increased pain in her medial knee following walking on the TM. Added in US due to soreness. Tolerated well. Patient will continue to benefit from skilled PT services to modify and progress therapeutic interventions, address functional mobility deficits, address ROM deficits, address strength deficits, analyze and address soft tissue restrictions, analyze and cue movement patterns and analyze and modify body mechanics/ergonomics to attain remaining goals. Treatment was performed with direct supervision by Jatin Carpenter, PT,  DPT. [x]  See Plan of Care  []  See progress note/recertification  []  See Discharge Summary         Progress towards goals / Updated goals:  Short Term Goals: To be accomplished in 3 weeks:               Patient will not experience an increase in s/s with transitional movements               Patient will report a subjective reduction in morning joint stiffness               Patient will be able to walk her dog for 10 minutes without an increas in s/s  Long Term Goals: To be accomplished in 6 weeks:               Patient will be able to use the elliptical  for 20 minutes without an increase in s/s               Patient will be able to walk 1.5 miles without an increase in s/s  Frequency / Duration: Patient to be seen 2 times per week for 6 weeks.     PLAN  [x]  Upgrade activities as tolerated [x]  Continue plan of care  []  Update interventions per flow sheet       []  Discharge due to:_  []  Other:_      Nura Hein PT, DPT,  12/18/2020

## 2020-12-28 ENCOUNTER — VIRTUAL VISIT (OUTPATIENT)
Dept: INTERNAL MEDICINE CLINIC | Age: 60
End: 2020-12-28
Payer: COMMERCIAL

## 2020-12-28 DIAGNOSIS — S83.412D SPRAIN OF MEDIAL COLLATERAL LIGAMENT OF LEFT KNEE, SUBSEQUENT ENCOUNTER: Primary | ICD-10-CM

## 2020-12-28 PROCEDURE — 99214 OFFICE O/P EST MOD 30 MIN: CPT | Performed by: FAMILY MEDICINE

## 2020-12-28 NOTE — PROGRESS NOTES
Sania Palencia is a 63 y.o. female who was seen by synchronous (real-time) audio-video technology on 12/28/2020 for Knee Pain        Assessment & Plan:   Diagnoses and all orders for this visit:    1. Sprain of medial collateral ligament of left knee, subsequent encounter    Concern for meniscus tear, will get MRI to further delineate MCL versus meniscus tear        Subjective:   Patient is a 56-year-old female who is following up on left knee pain. Patient had an injury over 1 month ago and has been in physical therapy since but still continues to have pain and aching at night. Patient states that if she moves or twists the wrong way at night will be very stiff in the morning. She is taking anti-inflammatories and physical therapy greater than 6 weeks which includes formal and also home physical therapy    Prior to Admission medications    Medication Sig Start Date End Date Taking? Authorizing Provider   naproxen (NAPROSYN) 500 mg tablet Take 1 Tab by mouth two (2) times daily (with meals). 11/11/20   Sabrina Madrid MD   lisinopriL (PRINIVIL, ZESTRIL) 40 mg tablet TAKE 1 TABLET BY MOUTH DAILY 10/26/20   Sabrina Madrid MD   PARoxetine (PAXIL) 40 mg tablet TAKE 1 TABLET BY MOUTH EVERY DAY 7/27/20   Sabrina Madrid MD   lisinopril (PRINIVIL, ZESTRIL) 20 mg tablet TAKE 1 TABLET BY MOUTH EVERY DAY 11/22/19   Sabrina Madrid MD   timolol (TIMOPTIC) 0.5 % ophthalmic solution INSTILL 1 DROP IN RIGHT EYE IN THE MORNING 7/28/17   Provider, Historical   omeprazole (PRILOSEC) 20 mg capsule Take 20 mg by mouth daily. Provider, Historical     Current Outpatient Medications   Medication Sig Dispense Refill    naproxen (NAPROSYN) 500 mg tablet Take 1 Tab by mouth two (2) times daily (with meals).  30 Tab 0    lisinopriL (PRINIVIL, ZESTRIL) 40 mg tablet TAKE 1 TABLET BY MOUTH DAILY 90 Tab 1    PARoxetine (PAXIL) 40 mg tablet TAKE 1 TABLET BY MOUTH EVERY DAY 90 Tab 0    lisinopril (PRINIVIL, ZESTRIL) 20 mg tablet TAKE 1 TABLET BY MOUTH EVERY DAY 90 Tab 0    timolol (TIMOPTIC) 0.5 % ophthalmic solution INSTILL 1 DROP IN RIGHT EYE IN THE MORNING      omeprazole (PRILOSEC) 20 mg capsule Take 20 mg by mouth daily.        No Known Allergies  Past Medical History:   Diagnosis Date    Headache     Hypertension     Subarachnoid hemorrhage (HCC)      Past Surgical History:   Procedure Laterality Date    HX HEENT      HX OTHER SURGICAL Right 1981    eye     Family History   Problem Relation Age of Onset    Macular Degen Mother     Hypertension Father           Pertinent positive and negative systems noted in HPI, remainder of systems negative      Objective:     Patient-Reported Vitals 12/28/2020   Patient-Reported Weight 220   Patient-Reported Height 5'7\"   Patient-Reported Pulse 78   Patient-Reported Temperature 98.4   Patient-Reported Systolic  311   Patient-Reported Diastolic 93        [INSTRUCTIONS:  \"[x]\" Indicates a positive item  \"[]\" Indicates a negative item  -- DELETE ALL ITEMS NOT EXAMINED]    Constitutional: [x] Appears well-developed and well-nourished [x] No apparent distress      [] Abnormal -     Mental status: [x] Alert and awake  [x] Oriented to person/place/time [x] Able to follow commands    [] Abnormal -     Eyes:   EOM    [x]  Normal    [] Abnormal -   Sclera  [x]  Normal    [] Abnormal -          Discharge [x]  None visible   [] Abnormal -     HENT: [x] Normocephalic, atraumatic  [] Abnormal -   [x] Mouth/Throat: Mucous membranes are moist    External Ears [x] Normal  [] Abnormal -    Neck: [x] No visualized mass [] Abnormal -     Pulmonary/Chest: [x] Respiratory effort normal   [x] No visualized signs of difficulty breathing or respiratory distress        [] Abnormal -      Musculoskeletal:   [x] Normal gait with no signs of ataxia         [x] Normal range of motion of neck        [] Abnormal -     Neurological:        [x] No Facial Asymmetry (Cranial nerve 7 motor function) (limited exam due to video visit) [x] No gaze palsy        [] Abnormal -          Skin:        [x] No significant exanthematous lesions or discoloration noted on facial skin         [] Abnormal -            Psychiatric:       [x] Normal Affect [] Abnormal -        [x] No Hallucinations    Other pertinent observable physical exam findings:-        We discussed the expected course, resolution and complications of the diagnosis(es) in detail. Medication risks, benefits, costs, interactions, and alternatives were discussed as indicated. I advised her to contact the office if her condition worsens, changes or fails to improve as anticipated. She expressed understanding with the diagnosis(es) and plan. Nery Conrad, who was evaluated through a patient-initiated, synchronous (real-time) audio-video encounter, and/or her healthcare decision maker, is aware that it is a billable service, with coverage as determined by her insurance carrier. She provided verbal consent to proceed: Yes, and patient identification was verified. It was conducted pursuant to the emergency declaration under the [facility], [id number] waiver authority and the [employer] Act. A caregiver was present when appropriate. Ability to conduct physical exam was limited. I was at home. The patient was at home.       Nella Landa MD

## 2021-01-22 ENCOUNTER — VIRTUAL VISIT (OUTPATIENT)
Dept: INTERNAL MEDICINE CLINIC | Age: 61
End: 2021-01-22
Payer: COMMERCIAL

## 2021-01-22 DIAGNOSIS — U07.1 COVID-19: Primary | ICD-10-CM

## 2021-01-22 PROCEDURE — 99214 OFFICE O/P EST MOD 30 MIN: CPT | Performed by: FAMILY MEDICINE

## 2021-01-22 RX ORDER — AZITHROMYCIN 250 MG/1
TABLET, FILM COATED ORAL
Qty: 6 TAB | Refills: 0 | Status: SHIPPED | OUTPATIENT
Start: 2021-01-22 | End: 2021-01-27

## 2021-01-22 NOTE — PROGRESS NOTES
Ezio Huston is a 63 y.o. female who was seen by synchronous (real-time) audio-video technology on 1/22/2021 for Concern For COVID-19 (Coronavirus)        Assessment & Plan:   Diagnoses and all orders for this visit:    1. COVID-19    Other orders  -     azithromycin (ZITHROMAX) 250 mg tablet; Take 2 tablets today, then take 1 tablet daily    Continue with quarantine per CDC guidelines  Patient will continue with zinc, vitamin C, vitamin D  Patient to monitor for any worsening chest pain shortness of breath or cough and will call us or go to ER immediately        Subjective:     Patient is a 68-year-old female who tested positive for Covid. Mild symptoms which involve mild cough but she does complain of right lymph node swelling and pain around the ear. She does not have a fever, denies any chest pain shortness of breath    Positive test this past Tuesday, symptoms since Monday. mild symptoms    Prior to Admission medications    Medication Sig Start Date End Date Taking? Authorizing Provider   naproxen (NAPROSYN) 500 mg tablet Take 1 Tab by mouth two (2) times daily (with meals). 11/11/20   Avery Cline MD   lisinopriL (PRINIVIL, ZESTRIL) 40 mg tablet TAKE 1 TABLET BY MOUTH DAILY 10/26/20   Avery Cline MD   PARoxetine (PAXIL) 40 mg tablet TAKE 1 TABLET BY MOUTH EVERY DAY 7/27/20   Avery Cline MD   lisinopril (PRINIVIL, ZESTRIL) 20 mg tablet TAKE 1 TABLET BY MOUTH EVERY DAY 11/22/19   Avery Cline MD   timolol (TIMOPTIC) 0.5 % ophthalmic solution INSTILL 1 DROP IN RIGHT EYE IN THE MORNING 7/28/17   Provider, Historical   omeprazole (PRILOSEC) 20 mg capsule Take 20 mg by mouth daily. Provider, Historical     Current Outpatient Medications   Medication Sig Dispense Refill    azithromycin (ZITHROMAX) 250 mg tablet Take 2 tablets today, then take 1 tablet daily 6 Tab 0    naproxen (NAPROSYN) 500 mg tablet Take 1 Tab by mouth two (2) times daily (with meals).  30 Tab 0    lisinopriL (Eran Carmine) 40 mg tablet TAKE 1 TABLET BY MOUTH DAILY 90 Tab 1    PARoxetine (PAXIL) 40 mg tablet TAKE 1 TABLET BY MOUTH EVERY DAY 90 Tab 0    lisinopril (PRINIVIL, ZESTRIL) 20 mg tablet TAKE 1 TABLET BY MOUTH EVERY DAY 90 Tab 0    timolol (TIMOPTIC) 0.5 % ophthalmic solution INSTILL 1 DROP IN RIGHT EYE IN THE MORNING      omeprazole (PRILOSEC) 20 mg capsule Take 20 mg by mouth daily.        No Known Allergies  Past Medical History:   Diagnosis Date    Headache     Hypertension     Subarachnoid hemorrhage (HCC)      Past Surgical History:   Procedure Laterality Date    HX HEENT      HX OTHER SURGICAL Right 1981    eye     Family History   Problem Relation Age of Onset    Macular Degen Mother     Hypertension Father            Objective:     Patient-Reported Vitals 12/28/2020   Patient-Reported Weight 220   Patient-Reported Height 5'7\"   Patient-Reported Pulse 78   Patient-Reported Temperature 98.4   Patient-Reported Systolic  732   Patient-Reported Diastolic 93        [INSTRUCTIONS:  \"[x]\" Indicates a positive item  \"[]\" Indicates a negative item  -- DELETE ALL ITEMS NOT EXAMINED]    Constitutional: [x] Appears well-developed and well-nourished [x] No apparent distress      [] Abnormal -     Mental status: [x] Alert and awake  [x] Oriented to person/place/time [x] Able to follow commands    [] Abnormal -     Eyes:   EOM    [x]  Normal    [] Abnormal -   Sclera  [x]  Normal    [] Abnormal -          Discharge [x]  None visible   [] Abnormal -     HENT: [x] Normocephalic, atraumatic  [] Abnormal -   [x] Mouth/Throat: Mucous membranes are moist    External Ears [x] Normal  [] Abnormal -    Neck: [x] No visualized mass [] Abnormal -     Pulmonary/Chest: [x] Respiratory effort normal   [x] No visualized signs of difficulty breathing or respiratory distress        [] Abnormal -      Musculoskeletal:   [x] Normal gait with no signs of ataxia         [x] Normal range of motion of neck        [] Abnormal -     Neurological: [x] No Facial Asymmetry (Cranial nerve 7 motor function) (limited exam due to video visit)          [x] No gaze palsy        [] Abnormal -          Skin:        [x] No significant exanthematous lesions or discoloration noted on facial skin         [] Abnormal -            Psychiatric:       [x] Normal Affect [] Abnormal -        [x] No Hallucinations    Other pertinent observable physical exam findings:-        We discussed the expected course, resolution and complications of the diagnosis(es) in detail. Medication risks, benefits, costs, interactions, and alternatives were discussed as indicated. I advised her to contact the office if her condition worsens, changes or fails to improve as anticipated. She expressed understanding with the diagnosis(es) and plan. Priscila Ring, who was evaluated through a patient-initiated, synchronous (real-time) audio-video encounter, and/or her healthcare decision maker, is aware that it is a billable service, with coverage as determined by her insurance carrier. She provided verbal consent to proceed: Yes, and patient identification was verified. It was conducted pursuant to the emergency declaration under the [address] [place] [address] authority and the [employer] and [employer] General Act. A caregiver was present when appropriate. Ability to conduct physical exam was limited. I was at home. The patient was at home.       Bao Tate MD

## 2021-01-29 ENCOUNTER — VIRTUAL VISIT (OUTPATIENT)
Dept: INTERNAL MEDICINE CLINIC | Age: 61
End: 2021-01-29
Payer: COMMERCIAL

## 2021-01-29 DIAGNOSIS — U07.1 COVID-19: Primary | ICD-10-CM

## 2021-01-29 PROCEDURE — 99214 OFFICE O/P EST MOD 30 MIN: CPT | Performed by: FAMILY MEDICINE

## 2021-01-29 NOTE — PROGRESS NOTES
Hal Wang is a 63 y.o. female who was seen by synchronous (real-time) audio-video technology on 1/29/2021 for Positive For Covid-19        Assessment & Plan:   Diagnoses and all orders for this visit:    1. COVID-19      Patient encouraged to continue with oral hydration and nutrition. Patient will continue taking vitamins and return to clinic as needed with any worsening symptoms. Subjective:     Patient is a 56-year-old female who is following up on coronavirus positive. Patient is now on day 11 since positive test.  Patient states that she is taking Z-Brooks and is currently taking ibuprofen and no other meds. She is taking over-the-counter vitamins such as vitamin C zinc and vitamin D. Patient states that she is starting to feel better and denies any fever, chest pain, chills shortness of breath except when going up 18 stairs before bedtime. States her energy is coming back slowly. Prior to Admission medications    Medication Sig Start Date End Date Taking? Authorizing Provider   naproxen (NAPROSYN) 500 mg tablet Take 1 Tab by mouth two (2) times daily (with meals). 11/11/20   Delmi Greer MD   lisinopriL (PRINIVIL, ZESTRIL) 40 mg tablet TAKE 1 TABLET BY MOUTH DAILY 10/26/20   Delmi Greer MD   PARoxetine (PAXIL) 40 mg tablet TAKE 1 TABLET BY MOUTH EVERY DAY 7/27/20   Delmi Greer MD   lisinopril (PRINIVIL, ZESTRIL) 20 mg tablet TAKE 1 TABLET BY MOUTH EVERY DAY 11/22/19   Delmi Greer MD   timolol (TIMOPTIC) 0.5 % ophthalmic solution INSTILL 1 DROP IN RIGHT EYE IN THE MORNING 7/28/17   Provider, Historical   omeprazole (PRILOSEC) 20 mg capsule Take 20 mg by mouth daily. Provider, Historical     Current Outpatient Medications   Medication Sig Dispense Refill    naproxen (NAPROSYN) 500 mg tablet Take 1 Tab by mouth two (2) times daily (with meals).  30 Tab 0    lisinopriL (PRINIVIL, ZESTRIL) 40 mg tablet TAKE 1 TABLET BY MOUTH DAILY 90 Tab 1    PARoxetine (PAXIL) 40 mg tablet TAKE 1 TABLET BY MOUTH EVERY DAY 90 Tab 0    lisinopril (PRINIVIL, ZESTRIL) 20 mg tablet TAKE 1 TABLET BY MOUTH EVERY DAY 90 Tab 0    timolol (TIMOPTIC) 0.5 % ophthalmic solution INSTILL 1 DROP IN RIGHT EYE IN THE MORNING      omeprazole (PRILOSEC) 20 mg capsule Take 20 mg by mouth daily.        No Known Allergies  Past Medical History:   Diagnosis Date    Headache     Hypertension     Subarachnoid hemorrhage (HCC)      Past Surgical History:   Procedure Laterality Date    HX HEENT      HX OTHER SURGICAL Right 1981    eye     Family History   Problem Relation Age of Onset    Macular Degen Mother     Hypertension Father            Objective:     Patient-Reported Vitals 12/28/2020   Patient-Reported Weight 220   Patient-Reported Height 5'7\"   Patient-Reported Pulse 78   Patient-Reported Temperature 98.4   Patient-Reported Systolic  212   Patient-Reported Diastolic 93        [INSTRUCTIONS:  \"[x]\" Indicates a positive item  \"[]\" Indicates a negative item  -- DELETE ALL ITEMS NOT EXAMINED]    Constitutional: [x] Appears well-developed and well-nourished [x] No apparent distress      [] Abnormal -     Mental status: [x] Alert and awake  [x] Oriented to person/place/time [x] Able to follow commands    [] Abnormal -     Eyes:   EOM    [x]  Normal    [] Abnormal -   Sclera  [x]  Normal    [] Abnormal -          Discharge [x]  None visible   [] Abnormal -     HENT: [x] Normocephalic, atraumatic  [] Abnormal -   [x] Mouth/Throat: Mucous membranes are moist    External Ears [x] Normal  [] Abnormal -    Neck: [x] No visualized mass [] Abnormal -     Pulmonary/Chest: [x] Respiratory effort normal   [x] No visualized signs of difficulty breathing or respiratory distress        [] Abnormal -      Musculoskeletal:   [x] Normal gait with no signs of ataxia         [x] Normal range of motion of neck        [] Abnormal -     Neurological:        [x] No Facial Asymmetry (Cranial nerve 7 motor function) (limited exam due to video visit) [x] No gaze palsy        [] Abnormal -          Skin:        [x] No significant exanthematous lesions or discoloration noted on facial skin         [] Abnormal -            Psychiatric:       [x] Normal Affect [] Abnormal -        [x] No Hallucinations    Other pertinent observable physical exam findings:-        We discussed the expected course, resolution and complications of the diagnosis(es) in detail. Medication risks, benefits, costs, interactions, and alternatives were discussed as indicated. I advised her to contact the office if her condition worsens, changes or fails to improve as anticipated. She expressed understanding with the diagnosis(es) and plan. Blane Barksdale, who was evaluated through a patient-initiated, synchronous (real-time) audio-video encounter, and/or her healthcare decision maker, is aware that it is a billable service, with coverage as determined by her insurance carrier. She provided verbal consent to proceed: Yes, and patient identification was verified. It was conducted pursuant to the emergency declaration under the [address] [place] [address] and the [employer] and [employer] General Act. A caregiver was present when appropriate. Ability to conduct physical exam was limited. I was at home. The patient was at home.       Roverto Mendes MD

## 2021-04-21 RX ORDER — LISINOPRIL 40 MG/1
TABLET ORAL
Qty: 90 TAB | Refills: 1 | Status: SHIPPED | OUTPATIENT
Start: 2021-04-21 | End: 2021-09-13

## 2021-04-23 RX ORDER — PAROXETINE HYDROCHLORIDE 40 MG/1
TABLET, FILM COATED ORAL
Qty: 90 TAB | Refills: 0 | Status: SHIPPED | OUTPATIENT
Start: 2021-04-23

## 2021-04-30 NOTE — ANCILLARY DISCHARGE INSTRUCTIONS
Physical Therapy at [facility],   a part of [facility]  [address] [address], [address] [place] [address], [address] [place] [address]  Phone: [phone]  Fax: [phone]    Discharge Summary  2-15    Patient name: Mara Razo                   : 1960                          Provider#: [id number]  Referral source: Ronny Hmailton MD                                                  Medical/Treatment Diagnosis: Acute pain of left knee [M25.562]                                         Prior Hospitalization: see medical history                                      Comorbidities: OA  Prior Level of Function: Independent  Medications: Verified on Patient Summary List     Start of Care:                                                                         Onset Date: 2020         *   Visits from Start of Care: 5   Reporting Period :  20 o 20    Progress towards goals / Updated goals:  Short Term Goals: To be accomplished in 3 weeks:NOT MET               Patient will not experience an increase in s/s with transitional movements               Patient will report a subjective reduction in morning joint stiffness               Patient will be able to walk her dog for 10 minutes without an increas in s/s  Long Term Goals: To be accomplished in 6 weeks:NOT MET               Patient will be able to use the elliptical  for 20 minutes without an increase in s/s               Patient will be able to walk 1.5 miles without an increase in s/s  Frequency / Duration: Patient to be seen 2 times per week for 6 weeks. ASSESSMENT/SUMMARY OF CARE: The pt was seen for 5 sessions in PT and made gradual improvement with with tolerance to strengthening and balance exercises. She failed to return following her lst scheduled appointment and will be discharged at this time.      RECOMMENDATIONS:  [x]Discontinue therapy: []Patient has reached or is progressing toward set goals      [x]Patient is non-compliant or has abdicated      []Due to lack of appreciable progress towards set goals    Tripp Wren, PT, DPT 4/30/2021

## 2021-09-13 RX ORDER — LISINOPRIL 40 MG/1
TABLET ORAL
Qty: 90 TABLET | Refills: 1 | Status: SHIPPED | OUTPATIENT
Start: 2021-09-13 | End: 2022-03-03

## 2021-09-20 ENCOUNTER — OFFICE VISIT (OUTPATIENT)
Dept: INTERNAL MEDICINE CLINIC | Age: 61
End: 2021-09-20
Payer: COMMERCIAL

## 2021-09-20 VITALS
SYSTOLIC BLOOD PRESSURE: 146 MMHG | WEIGHT: 222 LBS | RESPIRATION RATE: 16 BRPM | BODY MASS INDEX: 34.84 KG/M2 | OXYGEN SATURATION: 98 % | HEIGHT: 67 IN | TEMPERATURE: 97.9 F | DIASTOLIC BLOOD PRESSURE: 90 MMHG | HEART RATE: 64 BPM

## 2021-09-20 DIAGNOSIS — I10 ESSENTIAL HYPERTENSION WITH GOAL BLOOD PRESSURE LESS THAN 130/80: ICD-10-CM

## 2021-09-20 DIAGNOSIS — Z23 NEEDS FLU SHOT: ICD-10-CM

## 2021-09-20 DIAGNOSIS — Z01.419 WELL WOMAN EXAM: Primary | ICD-10-CM

## 2021-09-20 PROCEDURE — 90686 IIV4 VACC NO PRSV 0.5 ML IM: CPT | Performed by: FAMILY MEDICINE

## 2021-09-20 PROCEDURE — 99396 PREV VISIT EST AGE 40-64: CPT | Performed by: FAMILY MEDICINE

## 2021-09-20 PROCEDURE — 90471 IMMUNIZATION ADMIN: CPT | Performed by: FAMILY MEDICINE

## 2021-09-20 PROCEDURE — 99214 OFFICE O/P EST MOD 30 MIN: CPT | Performed by: FAMILY MEDICINE

## 2021-09-20 RX ORDER — AMLODIPINE BESYLATE 2.5 MG/1
2.5 TABLET ORAL DAILY
Qty: 90 TABLET | Refills: 0 | Status: SHIPPED | OUTPATIENT
Start: 2021-09-20 | End: 2021-10-04 | Stop reason: ALTCHOICE

## 2021-09-20 RX ORDER — AMLODIPINE BESYLATE 2.5 MG/1
2.5 TABLET ORAL DAILY
Qty: 30 TABLET | Refills: 1 | Status: SHIPPED | OUTPATIENT
Start: 2021-09-20 | End: 2021-09-20

## 2021-09-20 NOTE — PROGRESS NOTES
Chief Complaint   Patient presents with    Complete Physical     Patient is here for a wellness visit    Hypertension     she is a 57y.o. year old female who presents for CPE  Complete Physical Exam Questions:    LMP -  n/a  Last Pap (q 3 years, or q5 with HPV) - unknown  Last Mammogram (50-74 biennially)- 11/2020  Hx of abnl Pap - No    1. Do you follow a low fat diet?  no  2. Are you up to date on your Tdap (<10 years)? Yes  3. Have you ever had a Pneumovax vaccine (>65)? No   PCV13 No   PPSV23 No  4. Have you had Zoster vaccine (>60)? Yes  5. Have you had the HPV - Gardasil (13- 26)? Not applicable  6. Do you follow an exercise program?  no  7. Do you smoke?  no  8. Do you consider yourself overweight?  no  9. Is there a family history of CAD< age 48? No  10. Is there a family history of Cancer?  no  11. Do you know your Cancer risks? No  12. Have you had a colonoscopy? yes  13. Have you been tested for HIV or other STI's? No HIV testing today(18-66 y/o)? No  14. Have had a bone density scan or DEXA done(>65)? No  15. Have you had an EKG performed in the last five years (>50)? No    Other complaints:    Reviewed and agree with Nurse Note and duplicated in this note. Reviewed PmHx, RxHx, FmHx, SocHx, AllgHx and updated and dated in the chart.     Family History   Problem Relation Age of Onset    Macular Degen Mother     Hypertension Father        Past Medical History:   Diagnosis Date    Headache     Hypertension     Subarachnoid hemorrhage ([facility] [address]       Social History     Socioeconomic History    Marital status:      Spouse name: Not on file    Number of children: Not on file    Years of education: Not on file    Highest education level: Not on file   Tobacco Use    Smoking status: Never Smoker    Smokeless tobacco: Never Used   Substance and Sexual Activity    Alcohol use: Yes     Comment: 2-4 drinks per week    Drug use: No    Sexual activity: Yes     Partners: Female Birth control/protection: None     Social Determinants of Health     Financial Resource Strain:     Difficulty of Paying Living Expenses:    Food Insecurity:     Worried About Running Out of Food in the Last Year:     [address] in the Last Year:    Transportation Needs:     Lack of Transportation (Medical):      Lack of Transportation (Non-Medical):    Physical Activity:     Days of Exercise per Week:     Minutes of Exercise per Session:    Stress:     Feeling of Stress :    Social Connections:     Frequency of Communication with Friends and Family:     Frequency of Social Gatherings with Friends and Family:     Attends Restorationist Services:     Active Member of Clubs or Organizations:     Attends Club or Organization Meetings:     Marital Status:         Review of Systems - negative except as listed above      Objective:     Vitals:    09/20/21 1320   BP: (!) 146/90   Pulse: 64   Resp: 16   Temp: 97.9 °F (36.6 °C)   SpO2: 98%   Weight: 222 lb (100.7 kg)   Height: 5' 7\" (1.702 m)       Physical Examination: General appearance - alert, well appearing, and in no distress  Eyes - pupils equal and reactive, extraocular eye movements intact  Ears - bilateral TM's and external ear canals normal  Nose - normal and patent, no erythema, discharge or polyps  Mouth - mucous membranes moist, pharynx normal without lesions  Neck - supple, no significant adenopathy  Chest - clear to auscultation, no wheezes, rales or rhonchi, symmetric air entry  Heart - normal rate, regular rhythm, normal S1, S2, no murmurs, rubs, clicks or gallops  Abdomen - soft, nontender, nondistended, no masses or organomegaly  Neurological - alert, oriented, normal speech, no focal findings or movement disorder noted  Musculoskeletal - no joint tenderness, deformity or swelling  Extremities - peripheral pulses normal, no pedal edema, no clubbing or cyanosis  Skin - normal coloration and turgor, no rashes, no suspicious skin lesions noted      Assessment/ Plan:   Diagnoses and all orders for this visit:    1. Well woman exam  -     CBC W/O DIFF; Future  -     METABOLIC PANEL, COMPREHENSIVE; Future  -     LIPID PANEL; Future  -     HEMOGLOBIN A1C WITH EAG; Future    2. Essential hypertension with goal blood pressure less than 130/80    Other orders  -     amLODIPine (NORVASC) 2.5 mg tablet; Take 1 Tablet by mouth daily. Labs to be drawn: CBC, CMP, Lipid            I have discussed the diagnosis with the patient and the intended plan as seen in the above orders. The patient has received an after-visit summary and questions were answered concerning future plans. Medication Side Effects and Warnings were discussed with patient,  Patient Labs were reviewed and or requested, and  Patient Past Records were reviewed and or requested  yes       Chief Complaint   Patient presents with    Complete Physical     Patient is here for a wellness visit    Hypertension     Pt who presents for follow up of a pre-existing problem of hypertension. Diet and Lifestyle: generally follows a low fat low cholesterol diet  Home BP Monitoring: is well controlled at home, ranging 140's/90's  Use of agents associated with hypertension: none. Cardiovascular ROS: taking medications as instructed, no medication side effects noted, no TIA's, no chest pain on exertion, no dyspnea on exertion, no swelling of ankles. Reviewed and agree with Nurse Note and duplicated in this note. Reviewed PmHx, RxHx, FmHx, SocHx, AllgHx and updated and dated in the chart.     Family History   Problem Relation Age of Onset    Macular Degen Mother     Hypertension Father        Past Medical History:   Diagnosis Date    Headache     Hypertension     Subarachnoid hemorrhage (HCC)       Social History     Socioeconomic History    Marital status:      Spouse name: Not on file    Number of children: Not on file    Years of education: Not on file    Highest education level: Not on file   Tobacco Use    Smoking status: Never Smoker    Smokeless tobacco: Never Used   Substance and Sexual Activity    Alcohol use: Yes     Comment: 2-4 drinks per week    Drug use: No    Sexual activity: Yes     Partners: Female     Birth control/protection: None     Social Determinants of Health     Financial Resource Strain:     Difficulty of Paying Living Expenses:    Food Insecurity:     Worried About Running Out of Food in the Last Year:     Ran Out of Food in the Last Year:    Transportation Needs:     Lack of Transportation (Medical):      Lack of Transportation (Non-Medical):    Physical Activity:     Days of Exercise per Week:     Minutes of Exercise per Session:    Stress:     Feeling of Stress :    Social Connections:     Frequency of Communication with Friends and Family:     Frequency of Social Gatherings with Friends and Family:     Attends Buddhism Services:     Active Member of Clubs or Organizations:     Attends Club or Organization Meetings:     Marital Status:         Review of Systems - negative except as listed above      Objective:     Vitals:    09/20/21 1320   BP: (!) 146/90   Pulse: 64   Resp: 16   Temp: 97.9 °F (36.6 °C)   SpO2: 98%   Weight: 222 lb (100.7 kg)   Height: 5' 7\" (1.702 m)       Physical Examination: General appearance - alert, well appearing, and in no distress  Eyes - pupils equal and reactive, extraocular eye movements intact  Ears - bilateral TM's and external ear canals normal  Nose - normal and patent, no erythema, discharge or polyps  Mouth - mucous membranes moist, pharynx normal without lesions  Neck - supple, no significant adenopathy  Chest - clear to auscultation, no wheezes, rales or rhonchi, symmetric air entry  Heart - normal rate, regular rhythm, normal S1, S2, no murmurs, rubs, clicks or gallops  Abdomen - soft, nontender, nondistended, no masses or organomegaly  Neurological - alert, oriented, normal speech, no focal findings or movement disorder noted  Musculoskeletal - no joint tenderness, deformity or swelling  Extremities - peripheral pulses normal, no pedal edema, no clubbing or cyanosis  Skin - normal coloration and turgor, no rashes, no suspicious skin lesions noted     Assessment/ Plan:   Diagnoses and all orders for this visit:    1. Well woman exam  -     CBC W/O DIFF; Future  -     METABOLIC PANEL, COMPREHENSIVE; Future  -     LIPID PANEL; Future  -     HEMOGLOBIN A1C WITH EAG; Future    2. Essential hypertension with goal blood pressure less than 130/80    Other orders  -     amLODIPine (NORVASC) 2.5 mg tablet; Take 1 Tablet by mouth daily. We will add amlodipine to her current lisinopril regimen, patient will continue to check blood pressures at home and report back in 2 weeks at virtual visit          Medication Side Effects and Warnings were discussed with patient,  Patient Labs were reviewed and or requested, and  Patient Past Records were reviewed and or requested  yes       I have discussed the diagnosis with the patient and the intended plan as seen in the above orders. The patient has received an after-visit summary and questions were answered concerning future plans. Pt agrees to call or return to clinic and/or go to closest ER with any worsening of symptoms. This may include, but not limited to increased fever (>100.4) with NSAIDS or Tylenol, increased edema, confusion, rash, worsening of presenting symptoms. Please note that this dictation was completed with [employer], the computer voice recognition software. Quite often unanticipated grammatical, syntax, homophones, and other interpretive errors are inadvertently transcribed by the computer software. Please disregard these errors. Please excuse any errors that have escaped final proofreading. Thank you.

## 2021-09-20 NOTE — PATIENT INSTRUCTIONS
DASH Diet: Care Instructions  Your Care Instructions     The DASH diet is an eating plan that can help lower your blood pressure. DASH stands for Dietary Approaches to Stop Hypertension. Hypertension is high blood pressure. The DASH diet focuses on eating foods that are high in calcium, potassium, and magnesium. These nutrients can lower blood pressure. The foods that are highest in these nutrients are fruits, vegetables, low-fat dairy products, nuts, seeds, and legumes. But taking calcium, potassium, and magnesium supplements instead of eating foods that are high in those nutrients does not have the same effect. The DASH diet also includes whole grains, fish, and poultry. The DASH diet is one of several lifestyle changes your doctor may recommend to lower your high blood pressure. Your doctor may also want you to decrease the amount of sodium in your diet. Lowering sodium while following the DASH diet can lower blood pressure even further than just the DASH diet alone. Follow-up care is a key part of your treatment and safety. Be sure to make and go to all appointments, and call your doctor if you are having problems. It's also a good idea to know your test results and keep a list of the medicines you take. How can you care for yourself at home? Following the DASH diet  · Eat 4 to 5 servings of fruit each day. A serving is 1 medium-sized piece of fruit, ½ cup chopped or canned fruit, 1/4 cup dried fruit, or 4 ounces (½ cup) of fruit juice. Choose fruit more often than fruit juice. · Eat 4 to 5 servings of vegetables each day. A serving is 1 cup of lettuce or raw leafy vegetables, ½ cup of chopped or cooked vegetables, or 4 ounces (½ cup) of vegetable juice. Choose vegetables more often than vegetable juice. · Get 2 to 3 servings of low-fat and fat-free dairy each day. A serving is 8 ounces of milk, 1 cup of yogurt, or 1 ½ ounces of cheese. · Eat 6 to 8 servings of grains each day.  A serving is 1 slice of bread, 1 ounce of dry cereal, or ½ cup of cooked rice, pasta, or cooked cereal. Try to choose whole-grain products as much as possible. · Limit lean meat, poultry, and fish to 2 servings each day. A serving is 3 ounces, about the size of a deck of cards. · Eat 4 to 5 servings of nuts, seeds, and legumes (cooked dried beans, lentils, and split peas) each week. A serving is 1/3 cup of nuts, 2 tablespoons of seeds, or ½ cup of cooked beans or peas. · Limit fats and oils to 2 to 3 servings each day. A serving is 1 teaspoon of vegetable oil or 2 tablespoons of salad dressing. · Limit sweets and added sugars to 5 servings or less a week. A serving is 1 tablespoon jelly or jam, ½ cup sorbet, or 1 cup of lemonade. · Eat less than 2,300 milligrams (mg) of sodium a day. If you limit your sodium to 1,500 mg a day, you can lower your blood pressure even more. · Be aware that all of these are the suggested number of servings for people who eat 1,800 to 2,000 calories a day. Your recommended number of servings may be different if you need more or fewer calories. Tips for success  · Start small. Do not try to make dramatic changes to your diet all at once. You might feel that you are missing out on your favorite foods and then be more likely to not follow the plan. Make small changes, and stick with them. Once those changes become habit, add a few more changes. · Try some of the following:  ? Make it a goal to eat a fruit or vegetable at every meal and at snacks. This will make it easy to get the recommended amount of fruits and vegetables each day. ? Try yogurt topped with fruit and nuts for a snack or healthy dessert. ? Add lettuce, tomato, cucumber, and onion to sandwiches. ? Combine a ready-made pizza crust with low-fat mozzarella cheese and lots of vegetable toppings. Try using tomatoes, squash, spinach, broccoli, carrots, cauliflower, and onions. ?  Have a variety of cut-up vegetables with a low-fat dip as an appetizer instead of chips and dip. ? Sprinkle sunflower seeds or chopped almonds over salads. Or try adding chopped walnuts or almonds to cooked vegetables. ? Try some vegetarian meals using beans and peas. Add garbanzo or kidney beans to salads. Make burritos and tacos with mashed roberson beans or black beans. Where can you learn more? Go to http://www.fischer.com/  Enter H967 in the search box to learn more about \"DASH Diet: Care Instructions. \"  Current as of: April 29, 2021               Content Version: 13.0  © 2451-3254 [employer]. Care instructions adapted under license by [employer] (which disclaims liability or warranty for this information). If you have questions about a medical condition or this instruction, always ask your healthcare professional. [place] [address] any warranty or liability for your use of this information. Vaccine Information Statement    Influenza (Flu) Vaccine (Inactivated or Recombinant): What You Need to Know    Many vaccine information statements are available in Japanese and other languages. See www.immunize.org/vis. Hojas de información sobre vacunas están disponibles en español y en muchos otros idiomas. Visite www.immunize.org/vis. 1. Why get vaccinated? Influenza vaccine can prevent influenza (flu). Flu is a contagious disease that spreads around the [place] every year, usually between October and May. Anyone can get the flu, but it is more dangerous for some people. Infants and young children, people 72 years and older, pregnant people, and people with certain health conditions or a weakened immune system are at greatest risk of flu complications. Pneumonia, bronchitis, sinus infections, and ear infections are examples of flu-related complications. If you have a medical condition, such as heart disease, cancer, or diabetes, flu can make it worse.     Flu can cause fever and chills, sore throat, muscle aches, fatigue, cough, headache, and runny or stuffy nose. Some people may have vomiting and diarrhea, though this is more common in children than adults. In an average year, thousands of people in the [facility] die from flu, and many more are hospitalized. Flu vaccine prevents millions of illnesses and flu-related visits to the doctor each year. 2. Influenza vaccines     CDC recommends everyone 6 months and older get vaccinated every flu season. Children 6 months through 6years of age may need 2 doses during a single flu season. Everyone else needs only 1 dose each flu season. It takes about 2 weeks for protection to develop after vaccination. There are many flu viruses, and they are always changing. Each year a new flu vaccine is made to protect against the influenza viruses believed to be likely to cause disease in the upcoming flu season. Even when the vaccine doesnt exactly match these viruses, it may still provide some protection. Influenza vaccine does not cause flu. Influenza vaccine may be given at the same time as other vaccines. 3. Talk with your health care provider    Tell your vaccination provider if the person getting the vaccine:   Has had an allergic reaction after a previous dose of influenza vaccine, or has any severe, life-threatening allergies    Has ever had Guillain-Barré Syndrome (also called GBS)    In some cases, your health care provider may decide to postpone influenza vaccination until a future visit. Influenza vaccine can be administered at any time during pregnancy. People who are or will be pregnant during influenza season should receive inactivated influenza vaccine. People with minor illnesses, such as a cold, may be vaccinated. People who are moderately or severely ill should usually wait until they recover before getting influenza vaccine. Your health care provider can give you more information.     4. Risks of a vaccine reaction     Soreness, redness, and swelling where the shot is given, fever, muscle aches, and headache can happen after influenza vaccination.  There may be a very small increased risk of Guillain-Barré Syndrome (GBS) after inactivated influenza vaccine (the flu shot). Viviana Nieves children who get the flu shot along with pneumococcal vaccine (PCV13) and/or DTaP vaccine at the same time might be slightly more likely to have a seizure caused by fever. Tell your health care provider if a child who is getting flu vaccine has ever had a seizure. People sometimes faint after medical procedures, including vaccination. Tell your provider if you feel dizzy or have vision changes or ringing in the ears. As with any medicine, there is a very remote chance of a vaccine causing a severe allergic reaction, other serious injury, or death. 5. What if there is a serious problem? An allergic reaction could occur after the vaccinated person leaves the clinic. If you see signs of a severe allergic reaction (hives, swelling of the face and throat, difficulty breathing, a fast heartbeat, dizziness, or weakness), call 9-1-1 and get the person to the nearest hospital.    For other signs that concern you, call your health care provider. Adverse reactions should be reported to the Vaccine Adverse Event Reporting System (VAERS). Your health care provider will usually file this report, or you can do it yourself. Visit the VAERS website at www.vaers. hhs.gov or call 2-626.729.3879. VAERS is only for reporting reactions, and VAERS staff members do not give medical advice. 6. The National Vaccine Injury Compensation Program    The [facility] Vaccine Injury Compensation Program (VICP) is a federal program that was created to compensate people who may have been injured by certain vaccines. Claims regarding alleged injury or death due to vaccination have a time limit for filing, which may be as short as two years.  Visit the VICP website at www.hrsa.gov/vaccinecompensation or call 7-806.117.8713 to learn about the program and about filing a claim. 7. How can I learn more?  Ask your health care provider.  Call your local or state health department.  Visit the website of the Food and Drug Administration (FDA) for vaccine package inserts and additional information at www.fda.gov/vaccines-blood-biologics/vaccines.  Contact the Centers for Disease Control and Prevention (CDC):  - Call 0-271.468.9970 (1-800-CDC-INFO) or  - Visit CDCs influenza website at www.cdc.gov/flu. Vaccine Information Statement   Inactivated Influenza Vaccine   8/6/2021  41 USanam Spencer Bending [id number]   Department of Health and Human Services  Centers for Disease Control and Prevention    Office Use Only      Vaccine Information Statement    Influenza (Flu) Vaccine (Inactivated or Recombinant): What You Need to Know    Many vaccine information statements are available in Turkish and other languages. See www.immunize.org/vis. Hojas de información sobre vacunas están disponibles en español y en muchos otros idiomas. Visite www.immunize.org/vis. 1. Why get vaccinated? Influenza vaccine can prevent influenza (flu). Flu is a contagious disease that spreads around the [place] every year, usually between October and May. Anyone can get the flu, but it is more dangerous for some people. Infants and young children, people 72 years and older, pregnant people, and people with certain health conditions or a weakened immune system are at greatest risk of flu complications. Pneumonia, bronchitis, sinus infections, and ear infections are examples of flu-related complications. If you have a medical condition, such as heart disease, cancer, or diabetes, flu can make it worse. Flu can cause fever and chills, sore throat, muscle aches, fatigue, cough, headache, and runny or stuffy nose.  Some people may have vomiting and diarrhea, though this is more common in children than adults. In an average year, thousands of people in the [facility] die from flu, and many more are hospitalized. Flu vaccine prevents millions of illnesses and flu-related visits to the doctor each year. 2. Influenza vaccines     CDC recommends everyone 6 months and older get vaccinated every flu season. Children 6 months through 6years of age may need 2 doses during a single flu season. Everyone else needs only 1 dose each flu season. It takes about 2 weeks for protection to develop after vaccination. There are many flu viruses, and they are always changing. Each year a new flu vaccine is made to protect against the influenza viruses believed to be likely to cause disease in the upcoming flu season. Even when the vaccine doesnt exactly match these viruses, it may still provide some protection. Influenza vaccine does not cause flu. Influenza vaccine may be given at the same time as other vaccines. 3. Talk with your health care provider    Tell your vaccination provider if the person getting the vaccine:   Has had an allergic reaction after a previous dose of influenza vaccine, or has any severe, life-threatening allergies    Has ever had Guillain-Barré Syndrome (also called GBS)    In some cases, your health care provider may decide to postpone influenza vaccination until a future visit. Influenza vaccine can be administered at any time during pregnancy. People who are or will be pregnant during influenza season should receive inactivated influenza vaccine. People with minor illnesses, such as a cold, may be vaccinated. People who are moderately or severely ill should usually wait until they recover before getting influenza vaccine. Your health care provider can give you more information.     4. Risks of a vaccine reaction     Soreness, redness, and swelling where the shot is given, fever, muscle aches, and headache can happen after influenza vaccination.  There may be a very small increased risk of Guillain-Barré Syndrome (GBS) after inactivated influenza vaccine (the flu shot). [employer] children who get the flu shot along with pneumococcal vaccine (PCV13) and/or DTaP vaccine at the same time might be slightly more likely to have a seizure caused by fever. Tell your health care provider if a child who is getting flu vaccine has ever had a seizure. People sometimes faint after medical procedures, including vaccination. Tell your provider if you feel dizzy or have vision changes or ringing in the ears. As with any medicine, there is a very remote chance of a vaccine causing a severe allergic reaction, other serious injury, or death. 5. What if there is a serious problem? An allergic reaction could occur after the vaccinated person leaves the clinic. If you see signs of a severe allergic reaction (hives, swelling of the face and throat, difficulty breathing, a fast heartbeat, dizziness, or weakness), call 9-1-1 and get the person to the nearest hospital.    For other signs that concern you, call your health care provider. Adverse reactions should be reported to the Vaccine Adverse Event Reporting System (VAERS). Your health care provider will usually file this report, or you can do it yourself. Visit the VAERS website at www.vaers. hhs.gov or call 3-864.758.3760. VAERS is only for reporting reactions, and VAERS staff members do not give medical advice. 6. The National Vaccine Injury Compensation Program    The [employer] Vaccine Injury Compensation Program (VICP) is a federal program that was created to compensate people who may have been injured by certain vaccines. Claims regarding alleged injury or death due to vaccination have a time limit for filing, which may be as short as two years. Visit the VICP website at www.hrsa.gov/vaccinecompensation or call 8-905.611.2408 to learn about the program and about filing a claim.      7. How can I learn more?  Ask your health care provider.  Call your local or state health department.  Visit the website of the Food and Drug Administration (FDA) for vaccine package inserts and additional information at www.fda.gov/vaccines-blood-biologics/vaccines.  Contact the Centers for Disease Control and Prevention (CDC):  - Call 1-323.160.3757 (1-800-CDC-INFO) or  - Visit CDCs influenza website at www.cdc.gov/flu. Vaccine Information Statement   Inactivated Influenza Vaccine   8/6/2021  42 CARLA Mendoza [id number]   Department of Health and Human Services  Centers for Disease Control and Prevention    Office Use Only

## 2021-09-21 LAB
ALBUMIN SERPL-MCNC: 3.9 G/DL (ref 3.5–5)
ALBUMIN/GLOB SERPL: 1.3 {RATIO} (ref 1.1–2.2)
ALP SERPL-CCNC: 94 U/L (ref 45–117)
ALT SERPL-CCNC: 27 U/L (ref 12–78)
ANION GAP SERPL CALC-SCNC: 0 MMOL/L (ref 5–15)
AST SERPL-CCNC: 18 U/L (ref 15–37)
BILIRUB SERPL-MCNC: 0.5 MG/DL (ref 0.2–1)
BUN SERPL-MCNC: 25 MG/DL (ref 6–20)
BUN/CREAT SERPL: 27 (ref 12–20)
CALCIUM SERPL-MCNC: 9.4 MG/DL (ref 8.5–10.1)
CHLORIDE SERPL-SCNC: 112 MMOL/L (ref 97–108)
CHOLEST SERPL-MCNC: 260 MG/DL
CO2 SERPL-SCNC: 29 MMOL/L (ref 21–32)
CREAT SERPL-MCNC: 0.94 MG/DL (ref 0.55–1.02)
ERYTHROCYTE [DISTWIDTH] IN BLOOD BY AUTOMATED COUNT: 13.9 % (ref 11.5–14.5)
EST. AVERAGE GLUCOSE BLD GHB EST-MCNC: 123 MG/DL
GLOBULIN SER CALC-MCNC: 2.9 G/DL (ref 2–4)
GLUCOSE SERPL-MCNC: 97 MG/DL (ref 65–100)
HBA1C MFR BLD: 5.9 % (ref 4–5.6)
HCT VFR BLD AUTO: 44.2 % (ref 35–47)
HDLC SERPL-MCNC: 53 MG/DL
HDLC SERPL: 4.9 {RATIO} (ref 0–5)
HGB BLD-MCNC: 13.8 G/DL (ref 11.5–16)
LDLC SERPL CALC-MCNC: 184.6 MG/DL (ref 0–100)
MCH RBC QN AUTO: 30.7 PG (ref 26–34)
MCHC RBC AUTO-ENTMCNC: 31.2 G/DL (ref 30–36.5)
MCV RBC AUTO: 98.4 FL (ref 80–99)
NRBC # BLD: 0 K/UL (ref 0–0.01)
NRBC BLD-RTO: 0 PER 100 WBC
PLATELET # BLD AUTO: 205 K/UL (ref 150–400)
PMV BLD AUTO: 11.7 FL (ref 8.9–12.9)
POTASSIUM SERPL-SCNC: 5 MMOL/L (ref 3.5–5.1)
PROT SERPL-MCNC: 6.8 G/DL (ref 6.4–8.2)
RBC # BLD AUTO: 4.49 M/UL (ref 3.8–5.2)
SODIUM SERPL-SCNC: 141 MMOL/L (ref 136–145)
TRIGL SERPL-MCNC: 112 MG/DL (ref ?–150)
VLDLC SERPL CALC-MCNC: 22.4 MG/DL
WBC # BLD AUTO: 5.9 K/UL (ref 3.6–11)

## 2021-09-21 RX ORDER — ROSUVASTATIN CALCIUM 10 MG/1
10 TABLET, COATED ORAL
Qty: 30 TABLET | Refills: 2 | Status: SHIPPED | OUTPATIENT
Start: 2021-09-21 | End: 2021-12-21 | Stop reason: SDUPTHER

## 2021-09-21 NOTE — PROGRESS NOTES
Cholesterol is very high, will need to start cholesterol medication helping this number down. Also this to pharmacy please take as directed. We will repeat your cholesterol numbers in 3 months. Your sugar levels are stable in the prediabetes range, continue diet and exercise

## 2021-10-04 ENCOUNTER — VIRTUAL VISIT (OUTPATIENT)
Dept: INTERNAL MEDICINE CLINIC | Age: 61
End: 2021-10-04
Payer: COMMERCIAL

## 2021-10-04 DIAGNOSIS — I10 ESSENTIAL HYPERTENSION WITH GOAL BLOOD PRESSURE LESS THAN 130/80: Primary | ICD-10-CM

## 2021-10-04 PROCEDURE — 99214 OFFICE O/P EST MOD 30 MIN: CPT | Performed by: FAMILY MEDICINE

## 2021-10-04 RX ORDER — AMLODIPINE BESYLATE 5 MG/1
5 TABLET ORAL DAILY
Qty: 30 TABLET | Refills: 3 | Status: SHIPPED | OUTPATIENT
Start: 2021-10-04 | End: 2022-03-04 | Stop reason: SDUPTHER

## 2021-10-04 NOTE — PROGRESS NOTES
Marj Mensah is a 57 y.o. female who was seen by synchronous (real-time) audio-video technology on 10/4/2021 for No chief complaint on file. Assessment & Plan:   Diagnoses and all orders for this visit:    1. Essential hypertension with goal blood pressure less than 130/80    Other orders  -     amLODIPine (NORVASC) 5 mg tablet; Take 1 Tablet by mouth daily. Increase from amlodipine 2.5 to 5 mg daily  Patient will follow up in 2 weeks for blood pressure  Patient will continue with home blood pressure log  Patient will repeat cholesterol in 3 months      Subjective:   Patient is a 79-year-old female following up on blood pressure. She has been taking her additional amlodipine 2.5 mg tablet and states that her blood pressures are usually in the low 130's over 80s. Patient denies any side effects and states that she has been compliant with medication. She also just started her taking her Crestor as her cholesterol was elevated on last draw. Denies any side effects from the medication    Prior to Admission medications    Medication Sig Start Date End Date Taking? Authorizing Provider   amLODIPine (NORVASC) 5 mg tablet Take 1 Tablet by mouth daily. 10/4/21  Yes Tara Yip MD   rosuvastatin (CRESTOR) 10 mg tablet Take 1 Tablet by mouth nightly. 9/21/21   Tara Yip MD   lisinopriL (PRINIVIL, ZESTRIL) 40 mg tablet TAKE 1 TABLET BY MOUTH DAILY 9/13/21   Tara Yip MD   PARoxetine (PAXIL) 40 mg tablet TAKE 1 TABLET BY MOUTH EVERY DAY 4/23/21   Tara Yip MD   naproxen (NAPROSYN) 500 mg tablet Take 1 Tab by mouth two (2) times daily (with meals). 11/11/20   Tara Yip MD   timolol (TIMOPTIC) 0.5 % ophthalmic solution INSTILL 1 DROP IN RIGHT EYE IN THE MORNING 7/28/17   Provider, Historical   omeprazole (PRILOSEC) 20 mg capsule Take 20 mg by mouth daily.     Provider, Historical     Current Outpatient Medications   Medication Sig Dispense Refill    amLODIPine (NORVASC) 5 mg tablet Take 1 Tablet by mouth daily. 30 Tablet 3    rosuvastatin (CRESTOR) 10 mg tablet Take 1 Tablet by mouth nightly. 30 Tablet 2    lisinopriL (PRINIVIL, ZESTRIL) 40 mg tablet TAKE 1 TABLET BY MOUTH DAILY 90 Tablet 1    PARoxetine (PAXIL) 40 mg tablet TAKE 1 TABLET BY MOUTH EVERY DAY 90 Tab 0    naproxen (NAPROSYN) 500 mg tablet Take 1 Tab by mouth two (2) times daily (with meals). 30 Tab 0    timolol (TIMOPTIC) 0.5 % ophthalmic solution INSTILL 1 DROP IN RIGHT EYE IN THE MORNING      omeprazole (PRILOSEC) 20 mg capsule Take 20 mg by mouth daily.        No Known Allergies  Past Medical History:   Diagnosis Date    Headache     Hypertension     Subarachnoid hemorrhage (HCC)      Past Surgical History:   Procedure Laterality Date    HX HEENT      HX OTHER SURGICAL Right 1981    eye     Family History   Problem Relation Age of Onset    Macular Degen Mother     Hypertension Father      Social History     Tobacco Use    Smoking status: Never Smoker    Smokeless tobacco: Never Used   Substance Use Topics    Alcohol use: Yes     Comment: 2-4 drinks per week       ROS    Objective:     Patient-Reported Vitals 10/4/2021   Patient-Reported Weight 219   Patient-Reported Height 57   Patient-Reported Pulse -   Patient-Reported Temperature -   Patient-Reported Systolic  343   Patient-Reported Diastolic 85        [INSTRUCTIONS:  \"[x]\" Indicates a positive item  \"[]\" Indicates a negative item  -- DELETE ALL ITEMS NOT EXAMINED]    Constitutional: [x] Appears well-developed and well-nourished [x] No apparent distress      [] Abnormal -     Mental status: [x] Alert and awake  [x] Oriented to person/place/time [x] Able to follow commands    [] Abnormal -     Eyes:   EOM    [x]  Normal    [] Abnormal -   Sclera  [x]  Normal    [] Abnormal -          Discharge [x]  None visible   [] Abnormal -     HENT: [x] Normocephalic, atraumatic  [] Abnormal -   [x] Mouth/Throat: Mucous membranes are moist    External Ears [x] Normal  [] Abnormal -    Neck: [x] No visualized mass [] Abnormal -     Pulmonary/Chest: [x] Respiratory effort normal   [x] No visualized signs of difficulty breathing or respiratory distress        [] Abnormal -      Musculoskeletal:   [x] Normal gait with no signs of ataxia         [x] Normal range of motion of neck        [] Abnormal -     Neurological:        [x] No Facial Asymmetry (Cranial nerve 7 motor function) (limited exam due to video visit)          [x] No gaze palsy        [] Abnormal -          Skin:        [x] No significant exanthematous lesions or discoloration noted on facial skin         [] Abnormal -            Psychiatric:       [x] Normal Affect [] Abnormal -        [x] No Hallucinations    Other pertinent observable physical exam findings:-        We discussed the expected course, resolution and complications of the diagnosis(es) in detail. Medication risks, benefits, costs, interactions, and alternatives were discussed as indicated. I advised her to contact the office if her condition worsens, changes or fails to improve as anticipated. She expressed understanding with the diagnosis(es) and plan. Caron Yamilka was evaluated through a synchronous (real-time) audio-video encounter. The patient (or guardian if applicable) is aware that this is a billable service. Verbal consent to proceed has been obtained within the past 12 months. The visit was conducted pursuant to the emergency declaration under the [address] [place] [address] authority and the [employer] and "[employer] General Act. Patient identification was verified, and a caregiver was present when appropriate. The patient was located in a state where the provider was credentialed to provide care.       Shellie Brennan MD

## 2021-10-04 NOTE — PATIENT INSTRUCTIONS
DASH Diet: Care Instructions  Your Care Instructions     The DASH diet is an eating plan that can help lower your blood pressure. DASH stands for Dietary Approaches to Stop Hypertension. Hypertension is high blood pressure. The DASH diet focuses on eating foods that are high in calcium, potassium, and magnesium. These nutrients can lower blood pressure. The foods that are highest in these nutrients are fruits, vegetables, low-fat dairy products, nuts, seeds, and legumes. But taking calcium, potassium, and magnesium supplements instead of eating foods that are high in those nutrients does not have the same effect. The DASH diet also includes whole grains, fish, and poultry. The DASH diet is one of several lifestyle changes your doctor may recommend to lower your high blood pressure. Your doctor may also want you to decrease the amount of sodium in your diet. Lowering sodium while following the DASH diet can lower blood pressure even further than just the DASH diet alone. Follow-up care is a key part of your treatment and safety. Be sure to make and go to all appointments, and call your doctor if you are having problems. It's also a good idea to know your test results and keep a list of the medicines you take. How can you care for yourself at home? Following the DASH diet  · Eat 4 to 5 servings of fruit each day. A serving is 1 medium-sized piece of fruit, ½ cup chopped or canned fruit, 1/4 cup dried fruit, or 4 ounces (½ cup) of fruit juice. Choose fruit more often than fruit juice. · Eat 4 to 5 servings of vegetables each day. A serving is 1 cup of lettuce or raw leafy vegetables, ½ cup of chopped or cooked vegetables, or 4 ounces (½ cup) of vegetable juice. Choose vegetables more often than vegetable juice. · Get 2 to 3 servings of low-fat and fat-free dairy each day. A serving is 8 ounces of milk, 1 cup of yogurt, or 1 ½ ounces of cheese. · Eat 6 to 8 servings of grains each day.  A serving is 1 slice of bread, 1 ounce of dry cereal, or ½ cup of cooked rice, pasta, or cooked cereal. Try to choose whole-grain products as much as possible. · Limit lean meat, poultry, and fish to 2 servings each day. A serving is 3 ounces, about the size of a deck of cards. · Eat 4 to 5 servings of nuts, seeds, and legumes (cooked dried beans, lentils, and split peas) each week. A serving is 1/3 cup of nuts, 2 tablespoons of seeds, or ½ cup of cooked beans or peas. · Limit fats and oils to 2 to 3 servings each day. A serving is 1 teaspoon of vegetable oil or 2 tablespoons of salad dressing. · Limit sweets and added sugars to 5 servings or less a week. A serving is 1 tablespoon jelly or jam, ½ cup sorbet, or 1 cup of lemonade. · Eat less than 2,300 milligrams (mg) of sodium a day. If you limit your sodium to 1,500 mg a day, you can lower your blood pressure even more. · Be aware that all of these are the suggested number of servings for people who eat 1,800 to 2,000 calories a day. Your recommended number of servings may be different if you need more or fewer calories. Tips for success  · Start small. Do not try to make dramatic changes to your diet all at once. You might feel that you are missing out on your favorite foods and then be more likely to not follow the plan. Make small changes, and stick with them. Once those changes become habit, add a few more changes. · Try some of the following:  ? Make it a goal to eat a fruit or vegetable at every meal and at snacks. This will make it easy to get the recommended amount of fruits and vegetables each day. ? Try yogurt topped with fruit and nuts for a snack or healthy dessert. ? Add lettuce, tomato, cucumber, and onion to sandwiches. ? Combine a ready-made pizza crust with low-fat mozzarella cheese and lots of vegetable toppings. Try using tomatoes, squash, spinach, broccoli, carrots, cauliflower, and onions. ?  Have a variety of cut-up vegetables with a low-fat dip as an appetizer instead of chips and dip. ? Sprinkle sunflower seeds or chopped almonds over salads. Or try adding chopped walnuts or almonds to cooked vegetables. ? Try some vegetarian meals using beans and peas. Add garbanzo or kidney beans to salads. Make burritos and tacos with mashed roberson beans or black beans. Where can you learn more? Go to http://www.fischer.com/  Enter H967 in the search box to learn more about \"DASH Diet: Care Instructions. \"  Current as of: April 29, 2021               Content Version: 13.0  © 7495-1572 [employer]. Care instructions adapted under license by [employer] (which disclaims liability or warranty for this information). If you have questions about a medical condition or this instruction, always ask your healthcare professional. [address] any warranty or liability for your use of this information.

## 2021-10-19 ENCOUNTER — VIRTUAL VISIT (OUTPATIENT)
Dept: INTERNAL MEDICINE CLINIC | Age: 61
End: 2021-10-19
Payer: COMMERCIAL

## 2021-10-19 DIAGNOSIS — I10 ESSENTIAL HYPERTENSION WITH GOAL BLOOD PRESSURE LESS THAN 130/80: Primary | ICD-10-CM

## 2021-10-19 PROCEDURE — 99214 OFFICE O/P EST MOD 30 MIN: CPT | Performed by: FAMILY MEDICINE

## 2021-10-19 NOTE — PROGRESS NOTES
Zenon Luna is a 57 y.o. female who was seen by synchronous (real-time) audio-video technology on 10/19/2021 for Hypertension        Assessment & Plan:   Diagnoses and all orders for this visit:    1. Essential hypertension with goal blood pressure less than 130/80    Will continue with amlodipine 5 mg and follow-up in 6 months for        Subjective:     Patient is a 69-year-old female following up for blood pressure. She is currently taking 5 mg amlodipine and states that she has been doing very well with this. Her blood pressures are all in the 120s over 70s and states that she has had no side effects. She has had few episodes of feeling lightheadedness but attributes this to hot flashes. She has not checked her blood pressure when she feels this way but will start doing so. She denies any chest pain shortness of breath daily  Prior to Admission medications    Medication Sig Start Date End Date Taking? Authorizing Provider   amLODIPine (NORVASC) 5 mg tablet Take 1 Tablet by mouth daily. 10/4/21   Ariel Lynch MD   rosuvastatin (CRESTOR) 10 mg tablet Take 1 Tablet by mouth nightly. 9/21/21   Ariel Lynch MD   lisinopriL (PRINIVIL, ZESTRIL) 40 mg tablet TAKE 1 TABLET BY MOUTH DAILY 9/13/21   Ariel Lynch MD   PARoxetine (PAXIL) 40 mg tablet TAKE 1 TABLET BY MOUTH EVERY DAY 4/23/21   Ariel Lynch MD   naproxen (NAPROSYN) 500 mg tablet Take 1 Tab by mouth two (2) times daily (with meals). 11/11/20   Ariel Lynch MD   timolol (TIMOPTIC) 0.5 % ophthalmic solution INSTILL 1 DROP IN RIGHT EYE IN THE MORNING 7/28/17   Provider, Historical   omeprazole (PRILOSEC) 20 mg capsule Take 20 mg by mouth daily.     Provider, Historical     Patient Active Problem List    Diagnosis Date Noted    Severe obesity ([facility] [address] 12/11/2020    Subarachnoid hemorrhage following injury ([facility] [address] 01/10/2019     Current Outpatient Medications   Medication Sig Dispense Refill    amLODIPine (NORVASC) 5 mg tablet Take 1 Tablet by mouth daily. 30 Tablet 3    rosuvastatin (CRESTOR) 10 mg tablet Take 1 Tablet by mouth nightly. 30 Tablet 2    lisinopriL (PRINIVIL, ZESTRIL) 40 mg tablet TAKE 1 TABLET BY MOUTH DAILY 90 Tablet 1    PARoxetine (PAXIL) 40 mg tablet TAKE 1 TABLET BY MOUTH EVERY DAY 90 Tab 0    naproxen (NAPROSYN) 500 mg tablet Take 1 Tab by mouth two (2) times daily (with meals). 30 Tab 0    timolol (TIMOPTIC) 0.5 % ophthalmic solution INSTILL 1 DROP IN RIGHT EYE IN THE MORNING      omeprazole (PRILOSEC) 20 mg capsule Take 20 mg by mouth daily.        No Known Allergies  Past Medical History:   Diagnosis Date    Headache     Hypertension     Subarachnoid hemorrhage (HCC)      Past Surgical History:   Procedure Laterality Date    HX HEENT      HX OTHER SURGICAL Right 1981    eye     Family History   Problem Relation Age of Onset    Macular Degen Mother     Hypertension Father      Social History     Tobacco Use    Smoking status: Never Smoker    Smokeless tobacco: Never Used   Substance Use Topics    Alcohol use: Yes     Comment: 2-4 drinks per week       ROS    Objective:     Patient-Reported Vitals 10/4/2021   Patient-Reported Weight 219   Patient-Reported Height 57   Patient-Reported Pulse -   Patient-Reported Temperature -   Patient-Reported Systolic  565   Patient-Reported Diastolic 85        [INSTRUCTIONS:  \"[x]\" Indicates a positive item  \"[]\" Indicates a negative item  -- DELETE ALL ITEMS NOT EXAMINED]    Constitutional: [x] Appears well-developed and well-nourished [x] No apparent distress      [] Abnormal -     Mental status: [x] Alert and awake  [x] Oriented to person/place/time [x] Able to follow commands    [] Abnormal -     Eyes:   EOM    [x]  Normal    [] Abnormal -   Sclera  [x]  Normal    [] Abnormal -          Discharge [x]  None visible   [] Abnormal -     HENT: [x] Normocephalic, atraumatic  [] Abnormal -   [x] Mouth/Throat: Mucous membranes are moist    External Ears [x] Normal  [] Abnormal -    Neck: [x] No visualized mass [] Abnormal -     Pulmonary/Chest: [x] Respiratory effort normal   [x] No visualized signs of difficulty breathing or respiratory distress        [] Abnormal -      Musculoskeletal:   [x] Normal gait with no signs of ataxia         [x] Normal range of motion of neck        [] Abnormal -     Neurological:        [x] No Facial Asymmetry (Cranial nerve 7 motor function) (limited exam due to video visit)          [x] No gaze palsy        [] Abnormal -          Skin:        [x] No significant exanthematous lesions or discoloration noted on facial skin         [] Abnormal -            Psychiatric:       [x] Normal Affect [] Abnormal -        [x] No Hallucinations    Other pertinent observable physical exam findings:-        We discussed the expected course, resolution and complications of the diagnosis(es) in detail. Medication risks, benefits, costs, interactions, and alternatives were discussed as indicated. I advised her to contact the office if her condition worsens, changes or fails to improve as anticipated. She expressed understanding with the diagnosis(es) and plan. Theresa Mena, was evaluated through a synchronous (real-time) audio-video encounter. The patient (or guardian if applicable) is aware that this is a billable service. Verbal consent to proceed has been obtained within the past 12 months. The visit was conducted pursuant to the emergency declaration under the [address] [place] [address] authority and the [employer] General Act. Patient identification was verified, and a caregiver was present when appropriate. The patient was located in a state where the provider was credentialed to provide care.       Raz Caruso MD

## 2021-10-19 NOTE — PATIENT INSTRUCTIONS
DASH Diet: Care Instructions  Your Care Instructions     The DASH diet is an eating plan that can help lower your blood pressure. DASH stands for Dietary Approaches to Stop Hypertension. Hypertension is high blood pressure. The DASH diet focuses on eating foods that are high in calcium, potassium, and magnesium. These nutrients can lower blood pressure. The foods that are highest in these nutrients are fruits, vegetables, low-fat dairy products, nuts, seeds, and legumes. But taking calcium, potassium, and magnesium supplements instead of eating foods that are high in those nutrients does not have the same effect. The DASH diet also includes whole grains, fish, and poultry. The DASH diet is one of several lifestyle changes your doctor may recommend to lower your high blood pressure. Your doctor may also want you to decrease the amount of sodium in your diet. Lowering sodium while following the DASH diet can lower blood pressure even further than just the DASH diet alone. Follow-up care is a key part of your treatment and safety. Be sure to make and go to all appointments, and call your doctor if you are having problems. It's also a good idea to know your test results and keep a list of the medicines you take. How can you care for yourself at home? Following the DASH diet  · Eat 4 to 5 servings of fruit each day. A serving is 1 medium-sized piece of fruit, ½ cup chopped or canned fruit, 1/4 cup dried fruit, or 4 ounces (½ cup) of fruit juice. Choose fruit more often than fruit juice. · Eat 4 to 5 servings of vegetables each day. A serving is 1 cup of lettuce or raw leafy vegetables, ½ cup of chopped or cooked vegetables, or 4 ounces (½ cup) of vegetable juice. Choose vegetables more often than vegetable juice. · Get 2 to 3 servings of low-fat and fat-free dairy each day. A serving is 8 ounces of milk, 1 cup of yogurt, or 1 ½ ounces of cheese. · Eat 6 to 8 servings of grains each day.  A serving is 1 slice of bread, 1 ounce of dry cereal, or ½ cup of cooked rice, pasta, or cooked cereal. Try to choose whole-grain products as much as possible. · Limit lean meat, poultry, and fish to 2 servings each day. A serving is 3 ounces, about the size of a deck of cards. · Eat 4 to 5 servings of nuts, seeds, and legumes (cooked dried beans, lentils, and split peas) each week. A serving is 1/3 cup of nuts, 2 tablespoons of seeds, or ½ cup of cooked beans or peas. · Limit fats and oils to 2 to 3 servings each day. A serving is 1 teaspoon of vegetable oil or 2 tablespoons of salad dressing. · Limit sweets and added sugars to 5 servings or less a week. A serving is 1 tablespoon jelly or jam, ½ cup sorbet, or 1 cup of lemonade. · Eat less than 2,300 milligrams (mg) of sodium a day. If you limit your sodium to 1,500 mg a day, you can lower your blood pressure even more. · Be aware that all of these are the suggested number of servings for people who eat 1,800 to 2,000 calories a day. Your recommended number of servings may be different if you need more or fewer calories. Tips for success  · Start small. Do not try to make dramatic changes to your diet all at once. You might feel that you are missing out on your favorite foods and then be more likely to not follow the plan. Make small changes, and stick with them. Once those changes become habit, add a few more changes. · Try some of the following:  ? Make it a goal to eat a fruit or vegetable at every meal and at snacks. This will make it easy to get the recommended amount of fruits and vegetables each day. ? Try yogurt topped with fruit and nuts for a snack or healthy dessert. ? Add lettuce, tomato, cucumber, and onion to sandwiches. ? Combine a ready-made pizza crust with low-fat mozzarella cheese and lots of vegetable toppings. Try using tomatoes, squash, spinach, broccoli, carrots, cauliflower, and onions. ?  Have a variety of cut-up vegetables with a low-fat dip as an appetizer instead of chips and dip. ? Sprinkle sunflower seeds or chopped almonds over salads. Or try adding chopped walnuts or almonds to cooked vegetables. ? Try some vegetarian meals using beans and peas. Add garbanzo or kidney beans to salads. Make burritos and tacos with mashed roberson beans or black beans. Where can you learn more? Go to http://www.fischer.com/  Enter H967 in the search box to learn more about \"DASH Diet: Care Instructions. \"  Current as of: April 29, 2021               Content Version: 13.0  © 0977-8972 [employer]. Care instructions adapted under license by [employer] (which disclaims liability or warranty for this information). If you have questions about a medical condition or this instruction, always ask your healthcare professional. [address] any warranty or liability for your use of this information.

## 2021-12-21 ENCOUNTER — TRANSCRIBE ORDER (OUTPATIENT)
Dept: SCHEDULING | Age: 61
End: 2021-12-21

## 2021-12-21 DIAGNOSIS — Z12.31 SCREENING MAMMOGRAM FOR HIGH-RISK PATIENT: Primary | ICD-10-CM

## 2021-12-23 RX ORDER — ROSUVASTATIN CALCIUM 10 MG/1
10 TABLET, COATED ORAL
Qty: 30 TABLET | Refills: 2 | Status: SHIPPED | OUTPATIENT
Start: 2021-12-23 | End: 2022-03-28 | Stop reason: SDUPTHER

## 2022-01-17 ENCOUNTER — HOSPITAL ENCOUNTER (OUTPATIENT)
Dept: MAMMOGRAPHY | Age: 62
Discharge: HOME | End: 2022-01-17
Attending: FAMILY MEDICINE
Payer: COMMERCIAL

## 2022-01-17 DIAGNOSIS — Z12.31 SCREENING MAMMOGRAM FOR HIGH-RISK PATIENT: ICD-10-CM

## 2022-01-17 PROCEDURE — 77067 SCR MAMMO BI INCL CAD: CPT

## 2022-03-03 RX ORDER — LISINOPRIL 40 MG/1
TABLET ORAL
Qty: 90 TABLET | Refills: 1 | Status: SHIPPED | OUTPATIENT
Start: 2022-03-03 | End: 2022-05-30 | Stop reason: SDUPTHER

## 2022-03-04 RX ORDER — AMLODIPINE BESYLATE 5 MG/1
5 TABLET ORAL DAILY
Qty: 30 TABLET | Refills: 3 | Status: SHIPPED | OUTPATIENT
Start: 2022-03-04 | End: 2022-05-30 | Stop reason: SDUPTHER

## 2022-03-18 PROBLEM — S06.6XAA SUBARACHNOID HEMORRHAGE FOLLOWING INJURY (HCC): Status: ACTIVE | Noted: 2019-01-10

## 2022-03-19 PROBLEM — E66.01 SEVERE OBESITY (HCC): Status: ACTIVE | Noted: 2020-12-11

## 2022-03-29 RX ORDER — ROSUVASTATIN CALCIUM 10 MG/1
10 TABLET, COATED ORAL
Qty: 30 TABLET | Refills: 2 | Status: SHIPPED | OUTPATIENT
Start: 2022-03-29 | End: 2022-06-20 | Stop reason: SDUPTHER

## 2022-05-18 NOTE — ROUTINE PROCESS
Bedside and Verbal shift change report given to [address] [place] [address] [address] (oncoming nurse) by Norma Regan RN (offgoing nurse). Report included the following information SBAR, Kardex, Intake/Output, MAR, Recent Results and Cardiac Rhythm nsr.
 used

## 2022-05-31 RX ORDER — AMLODIPINE BESYLATE 5 MG/1
5 TABLET ORAL DAILY
Qty: 30 TABLET | Refills: 3 | Status: SHIPPED | OUTPATIENT
Start: 2022-05-31 | End: 2022-08-23 | Stop reason: SDUPTHER

## 2022-05-31 RX ORDER — LISINOPRIL 40 MG/1
40 TABLET ORAL DAILY
Qty: 90 TABLET | Refills: 1 | Status: SHIPPED | OUTPATIENT
Start: 2022-05-31 | End: 2022-08-23 | Stop reason: SDUPTHER

## 2022-06-21 RX ORDER — ROSUVASTATIN CALCIUM 10 MG/1
10 TABLET, COATED ORAL
Qty: 30 TABLET | Refills: 2 | Status: SHIPPED | OUTPATIENT
Start: 2022-06-21 | End: 2022-09-19 | Stop reason: SDUPTHER

## 2022-08-24 RX ORDER — LISINOPRIL 40 MG/1
40 TABLET ORAL DAILY
Qty: 90 TABLET | Refills: 1 | Status: SHIPPED | OUTPATIENT
Start: 2022-08-24

## 2022-08-24 RX ORDER — AMLODIPINE BESYLATE 5 MG/1
5 TABLET ORAL DAILY
Qty: 30 TABLET | Refills: 3 | Status: SHIPPED | OUTPATIENT
Start: 2022-08-24

## 2022-09-19 RX ORDER — ROSUVASTATIN CALCIUM 10 MG/1
10 TABLET, COATED ORAL
Qty: 30 TABLET | Refills: 2 | Status: SHIPPED | OUTPATIENT
Start: 2022-09-19

## 2022-12-21 RX ORDER — ROSUVASTATIN CALCIUM 10 MG/1
10 TABLET, COATED ORAL
Qty: 30 TABLET | Refills: 2 | Status: SHIPPED | OUTPATIENT
Start: 2022-12-21

## 2022-12-21 NOTE — TELEPHONE ENCOUNTER
Patient has made a wellness visit appt on 01/11/22. Can she pls get enough of rx to last up until her appt date? Thanks!

## 2023-01-11 ENCOUNTER — OFFICE VISIT (OUTPATIENT)
Dept: INTERNAL MEDICINE CLINIC | Age: 63
End: 2023-01-11
Payer: COMMERCIAL

## 2023-01-11 VITALS
HEART RATE: 77 BPM | SYSTOLIC BLOOD PRESSURE: 137 MMHG | DIASTOLIC BLOOD PRESSURE: 84 MMHG | HEIGHT: 67 IN | WEIGHT: 225 LBS | RESPIRATION RATE: 16 BRPM | OXYGEN SATURATION: 98 % | BODY MASS INDEX: 35.31 KG/M2 | TEMPERATURE: 98 F

## 2023-01-11 DIAGNOSIS — E66.01 SEVERE OBESITY (BMI 35.0-39.9) WITH COMORBIDITY (HCC): ICD-10-CM

## 2023-01-11 DIAGNOSIS — E78.5 HYPERLIPIDEMIA, UNSPECIFIED HYPERLIPIDEMIA TYPE: ICD-10-CM

## 2023-01-11 DIAGNOSIS — I10 ESSENTIAL HYPERTENSION WITH GOAL BLOOD PRESSURE LESS THAN 130/80: ICD-10-CM

## 2023-01-11 DIAGNOSIS — Z00.00 WELL WOMAN EXAM (NO GYNECOLOGICAL EXAM): Primary | ICD-10-CM

## 2023-01-11 PROCEDURE — 3075F SYST BP GE 130 - 139MM HG: CPT | Performed by: FAMILY MEDICINE

## 2023-01-11 PROCEDURE — 99396 PREV VISIT EST AGE 40-64: CPT | Performed by: FAMILY MEDICINE

## 2023-01-11 PROCEDURE — 3079F DIAST BP 80-89 MM HG: CPT | Performed by: FAMILY MEDICINE

## 2023-01-11 PROCEDURE — 99214 OFFICE O/P EST MOD 30 MIN: CPT | Performed by: FAMILY MEDICINE

## 2023-01-11 RX ORDER — ROSUVASTATIN CALCIUM 10 MG/1
10 TABLET, COATED ORAL
Qty: 90 TABLET | Refills: 2 | Status: SHIPPED | OUTPATIENT
Start: 2023-01-11

## 2023-01-11 RX ORDER — AMLODIPINE BESYLATE 5 MG/1
5 TABLET ORAL DAILY
Qty: 90 TABLET | Refills: 2 | Status: SHIPPED | OUTPATIENT
Start: 2023-01-11

## 2023-01-11 NOTE — PROGRESS NOTES
Chief Complaint   Patient presents with    Complete Physical     she is a 60y.o. year old female who presents for CPE  Complete Physical Exam Questions:    LMP -  n/a  Last Pap (q 3 years, or q5 with HPV) - unknown  Last Mammogram (54-69 biennially)- 1year ago  Hx of abnl Pap - No    1. Do you follow a low fat diet?  no  2. Are you up to date on your Tdap (<10 years)? Yes  3. Have you ever had a Pneumovax vaccine (>65)? No   PCV13 No   PPSV23 No  4. Have you had Zoster vaccine (>60)? Yes  5. Have you had the HPV - Gardasil (13- 26)? No  6. Do you follow an exercise program?  no  7. Do you smoke?  no  8. Do you consider yourself overweight?  no  9. Is there a family history of CAD< age 48? Yes  10. Is there a family history of Cancer?  no  11. Do you know your Cancer risks? No  12. Have you had a colonoscopy? yes  13. Have you been tested for HIV or other STI's? No HIV testing today(18-64 y/o)? No  14. Have had a bone density scan or DEXA done(>65)? No  15. Have you had an EKG performed in the last five years (>50)? Yes    Other complaints:    Reviewed and agree with Nurse Note and duplicated in this note. Reviewed PmHx, RxHx, FmHx, SocHx, AllgHx and updated and dated in the chart.     Family History   Problem Relation Age of Onset    Macular Degen Mother     Hypertension Father        Past Medical History:   Diagnosis Date    Headache     Hypertension     Subarachnoid hemorrhage (HCC)       Social History     Socioeconomic History    Marital status:    Tobacco Use    Smoking status: Never    Smokeless tobacco: Never   Substance and Sexual Activity    Alcohol use: Yes     Comment: 2-4 drinks per week    Drug use: No    Sexual activity: Yes     Partners: Female     Birth control/protection: None        Review of Systems - negative except as listed above      Objective:     Vitals:    01/11/23 1554   BP: 137/84   Pulse: 77   Resp: 16   Temp: 98 °F (36.7 °C)   SpO2: 98%   Weight: 225 lb (102.1 kg)   Height: 5' 7\" (1.702 m)       Physical Examination: General appearance - alert, well appearing, and in no distress  Eyes - pupils equal and reactive, extraocular eye movements intact  Ears - bilateral TM's and external ear canals normal  Nose - normal and patent, no erythema, discharge or polyps  Mouth - mucous membranes moist, pharynx normal without lesions  Neck - supple, no significant adenopathy  Chest - clear to auscultation, no wheezes, rales or rhonchi, symmetric air entry  Heart - normal rate, regular rhythm, normal S1, S2, no murmurs, rubs, clicks or gallops  Abdomen - soft, nontender, nondistended, no masses or organomegaly  Neurological - alert, oriented, normal speech, no focal findings or movement disorder noted  Musculoskeletal - no joint tenderness, deformity or swelling  Extremities - peripheral pulses normal, no pedal edema, no clubbing or cyanosis  Skin - normal coloration and turgor, no rashes, no suspicious skin lesions noted      Assessment/ Plan:   Diagnoses and all orders for this visit:    1. Well woman exam (no gynecological exam)  -     CBC W/O DIFF; Future  -     HEMOGLOBIN A1C WITH EAG; Future  -     METABOLIC PANEL, COMPREHENSIVE; Future  -     LIPID PANEL; Future    2. Essential hypertension with goal blood pressure less than 130/80    3. Severe obesity (BMI 35.0-39. 9) with comorbidity ([address]  -     REFERRAL TO NUTRITION    Other orders  -     amLODIPine (NORVASC) 5 mg tablet; Take 1 Tablet by mouth daily. -     rosuvastatin (CRESTOR) 10 mg tablet; Take 1 Tablet by mouth nightly. Labs to be drawn: CBC, CMP, Lipid            I have discussed the diagnosis with the patient and the intended plan as seen in the above orders. The patient has received an after-visit summary and questions were answered concerning future plans.    Medication Side Effects and Warnings were discussed with patient,  Patient Labs were reviewed and or requested, and  Patient Past Records were reviewed and or requested  yes       Chief Complaint   Patient presents with    Complete Physical     Pt who presents for follow up of a pre-existing problem of hypertension. Diet and Lifestyle: generally follows a low fat low cholesterol diet  Home BP Monitoring: is well controlled at home, ranging 120's/80's  Use of agents associated with hypertension: none. Cardiovascular ROS: taking medications as instructed, no medication side effects noted, no TIA's, no chest pain on exertion, no dyspnea on exertion, no swelling of ankles. Reviewed and agree with Nurse Note and duplicated in this note. Reviewed PmHx, RxHx, FmHx, SocHx, AllgHx and updated and dated in the chart.     Family History   Problem Relation Age of Onset    Macular Degen Mother     Hypertension Father        Past Medical History:   Diagnosis Date    Headache     Hypertension     Subarachnoid hemorrhage (HCC)       Social History     Socioeconomic History    Marital status:    Tobacco Use    Smoking status: Never    Smokeless tobacco: Never   Substance and Sexual Activity    Alcohol use: Yes     Comment: 2-4 drinks per week    Drug use: No    Sexual activity: Yes     Partners: Female     Birth control/protection: None        Review of Systems - negative except as listed above      Objective:     Vitals:    01/11/23 1554   BP: 137/84   Pulse: 77   Resp: 16   Temp: 98 °F (36.7 °C)   SpO2: 98%   Weight: 225 lb (102.1 kg)   Height: 5' 7\" (1.702 m)       Physical Examination: General appearance - alert, well appearing, and in no distress  Eyes - pupils equal and reactive, extraocular eye movements intact  Ears - bilateral TM's and external ear canals normal  Nose - normal and patent, no erythema, discharge or polyps  Mouth - mucous membranes moist, pharynx normal without lesions  Neck - supple, no significant adenopathy  Chest - clear to auscultation, no wheezes, rales or rhonchi, symmetric air entry  Heart - normal rate, regular rhythm, normal S1, S2, no murmurs, rubs, clicks or gallops  Abdomen - soft, nontender, nondistended, no masses or organomegaly  Extremities - peripheral pulses normal, no pedal edema, no clubbing or cyanosis  Skin - normal coloration and turgor, no rashes, no suspicious skin lesions noted     Assessment/ Plan:   Diagnoses and all orders for this visit:    1. Well woman exam (no gynecological exam)  -     CBC W/O DIFF; Future  -     HEMOGLOBIN A1C WITH EAG; Future  -     METABOLIC PANEL, COMPREHENSIVE; Future  -     LIPID PANEL; Future    2. Essential hypertension with goal blood pressure less than 130/80  Blood pressures are stable at home, will continue with current medications  Patient will work on [employer] and exercise, with nutrition consult  3. Severe obesity (BMI 35.0-39. 9) with comorbidity ([address]  -     REFERRAL TO NUTRITION    Other orders  -     amLODIPine (NORVASC) 5 mg tablet; Take 1 Tablet by mouth daily. -     rosuvastatin (CRESTOR) 10 mg tablet; Take 1 Tablet by mouth nightly. Medication Side Effects and Warnings were discussed with patient,  Patient Labs were reviewed and or requested, and  Patient Past Records were reviewed and or requested  yes       I have discussed the diagnosis with the patient and the intended plan as seen in the above orders. The patient has received an after-visit summary and questions were answered concerning future plans. Pt agrees to call or return to clinic and/or go to closest ER with any worsening of symptoms. This may include, but not limited to increased fever (>100.4) with NSAIDS or Tylenol, increased edema, confusion, rash, worsening of presenting symptoms. Please note that this dictation was completed with [employer], the [employer] voice recognition software. Quite often unanticipated grammatical, syntax, homophones, and other interpretive errors are inadvertently transcribed by the computer software. Please disregard these errors.   Please excuse any errors that have escaped final proofreading. Thank you.

## 2023-01-12 LAB
ALBUMIN SERPL-MCNC: 3.9 G/DL (ref 3.5–5)
ALBUMIN/GLOB SERPL: 1.2 (ref 1.1–2.2)
ALP SERPL-CCNC: 100 U/L (ref 45–117)
ALT SERPL-CCNC: 41 U/L (ref 12–78)
ANION GAP SERPL CALC-SCNC: ABNORMAL MMOL/L (ref 5–15)
AST SERPL-CCNC: 24 U/L (ref 15–37)
BILIRUB SERPL-MCNC: 0.3 MG/DL (ref 0.2–1)
BUN SERPL-MCNC: 25 MG/DL (ref 6–20)
BUN/CREAT SERPL: 25 (ref 12–20)
CALCIUM SERPL-MCNC: 9.6 MG/DL (ref 8.5–10.1)
CHLORIDE SERPL-SCNC: 112 MMOL/L (ref 97–108)
CHOLEST SERPL-MCNC: 161 MG/DL
CO2 SERPL-SCNC: 30 MMOL/L (ref 21–32)
CREAT SERPL-MCNC: 0.99 MG/DL (ref 0.55–1.02)
ERYTHROCYTE [DISTWIDTH] IN BLOOD BY AUTOMATED COUNT: 13.7 % (ref 11.5–14.5)
EST. AVERAGE GLUCOSE BLD GHB EST-MCNC: 126 MG/DL
GLOBULIN SER CALC-MCNC: 3.3 G/DL (ref 2–4)
GLUCOSE SERPL-MCNC: 111 MG/DL (ref 65–100)
HBA1C MFR BLD: 6 % (ref 4–5.6)
HCT VFR BLD AUTO: 46 % (ref 35–47)
HDLC SERPL-MCNC: 53 MG/DL
HDLC SERPL: 3 (ref 0–5)
HGB BLD-MCNC: 14 G/DL (ref 11.5–16)
LDLC SERPL CALC-MCNC: 80 MG/DL (ref 0–100)
MCH RBC QN AUTO: 29.8 PG (ref 26–34)
MCHC RBC AUTO-ENTMCNC: 30.4 G/DL (ref 30–36.5)
MCV RBC AUTO: 97.9 FL (ref 80–99)
NRBC # BLD: 0 K/UL (ref 0–0.01)
NRBC BLD-RTO: 0 PER 100 WBC
PLATELET # BLD AUTO: 222 K/UL (ref 150–400)
PMV BLD AUTO: 11.9 FL (ref 8.9–12.9)
POTASSIUM SERPL-SCNC: 4.8 MMOL/L (ref 3.5–5.1)
PROT SERPL-MCNC: 7.2 G/DL (ref 6.4–8.2)
RBC # BLD AUTO: 4.7 M/UL (ref 3.8–5.2)
SODIUM SERPL-SCNC: 141 MMOL/L (ref 136–145)
TRIGL SERPL-MCNC: 140 MG/DL (ref ?–150)
VLDLC SERPL CALC-MCNC: 28 MG/DL
WBC # BLD AUTO: 6 K/UL (ref 3.6–11)

## 2023-01-25 ENCOUNTER — HOSPITAL ENCOUNTER (OUTPATIENT)
Dept: MAMMOGRAPHY | Age: 63
Discharge: HOME | End: 2023-01-25
Attending: FAMILY MEDICINE
Payer: COMMERCIAL

## 2023-01-25 DIAGNOSIS — Z12.31 VISIT FOR SCREENING MAMMOGRAM: ICD-10-CM

## 2023-01-25 PROCEDURE — 77063 BREAST TOMOSYNTHESIS BI: CPT

## 2023-03-03 RX ORDER — LISINOPRIL 40 MG/1
40 TABLET ORAL DAILY
Qty: 90 TABLET | Refills: 1 | Status: SHIPPED | OUTPATIENT
Start: 2023-03-03

## 2023-03-09 RX ORDER — SEMAGLUTIDE 1.34 MG/ML
0.25 INJECTION, SOLUTION SUBCUTANEOUS
Qty: 1 BOX | Refills: 3 | Status: SHIPPED | OUTPATIENT
Start: 2023-03-09

## 2023-03-17 DIAGNOSIS — E78.5 HYPERLIPIDEMIA, UNSPECIFIED HYPERLIPIDEMIA TYPE: ICD-10-CM

## 2023-03-17 RX ORDER — ROSUVASTATIN CALCIUM 10 MG/1
10 TABLET, COATED ORAL
Qty: 90 TABLET | Refills: 2 | Status: SHIPPED | OUTPATIENT
Start: 2023-03-17

## 2023-04-06 ENCOUNTER — VIRTUAL VISIT (OUTPATIENT)
Dept: INTERNAL MEDICINE CLINIC | Age: 63
End: 2023-04-06
Payer: COMMERCIAL

## 2023-04-06 PROBLEM — E11.9 TYPE 2 DIABETES MELLITUS (HCC): Status: ACTIVE | Noted: 2023-04-06

## 2023-04-06 PROCEDURE — 99214 OFFICE O/P EST MOD 30 MIN: CPT | Performed by: FAMILY MEDICINE

## 2023-04-06 NOTE — PROGRESS NOTES
Renzo Erickson is a 60 y.o. female who was seen by synchronous (real-time) audio-video technology on 4/6/2023 for No chief complaint on file. Assessment & Plan:   Diagnoses and all orders for this visit:    1. Weight loss counseling, encounter for    Other orders  -     semaglutide (Ozempic) 0.25 mg or 0.5 mg/dose (2 mg/1.5 ml) subq pen; 0.5 mg by SubCUTAneous route every seven (7) days. We will do a trial of increasing her Ozempic 0.5 mg weekly  Follow-up in 4 weeks for weight check        Subjective:   Patient is a 69-year-old female with prediabetes following up on Ozempic. Patient states that for the first couple days she did feel nauseous but this is since abated and she is very comfortable with the medication. She states that she is working on her nutrition and also her exercise levels at the [facility] have increased. Patient states that she is very happy with current medication and weight loss regimen. Starting weight 220 lb  Current weight 210 lb  Blood pressures at home are ranging in the 120/80   Prior to Admission medications    Medication Sig Start Date End Date Taking? Authorizing Provider   rosuvastatin (CRESTOR) 10 mg tablet Take 1 Tablet by mouth nightly. 3/17/23   Radha Leary MD   semaglutide (Ozempic) 0.25 mg or 0.5 mg/dose (2 mg/1.5 ml) subq pen 0.25 mg by SubCUTAneous route every seven (7) days. 3/9/23   Radha Leary MD   lisinopriL (PRINIVIL, ZESTRIL) 40 mg tablet Take 1 Tablet by mouth daily. 3/3/23   Radha Leary MD   amLODIPine (NORVASC) 5 mg tablet Take 1 Tablet by mouth daily. 1/11/23   Radha Leary MD   PARoxetine (PAXIL) 40 mg tablet TAKE 1 TABLET BY MOUTH EVERY DAY 4/23/21   Radha Leary MD   timolol (TIMOPTIC) 0.5 % ophthalmic solution INSTILL 1 DROP IN RIGHT EYE IN THE MORNING 7/28/17   Provider, Historical   omeprazole (PRILOSEC) 20 mg capsule Take 20 mg by mouth daily.     Provider, Historical     Patient Active Problem List   Diagnosis Code    Subarachnoid hemorrhage following injury ([facility] S06. 6XAA    Severe obesity (HCC) E66.01     Patient Active Problem List    Diagnosis Date Noted    Severe obesity ([facility] 12/11/2020    Subarachnoid hemorrhage following injury ([facility] 01/10/2019     Current Outpatient Medications   Medication Sig Dispense Refill    rosuvastatin (CRESTOR) 10 mg tablet Take 1 Tablet by mouth nightly. 90 Tablet 2    semaglutide (Ozempic) 0.25 mg or 0.5 mg/dose (2 mg/1.5 ml) subq pen 0.25 mg by SubCUTAneous route every seven (7) days. 1 Box 3    lisinopriL (PRINIVIL, ZESTRIL) 40 mg tablet Take 1 Tablet by mouth daily. 90 Tablet 1    amLODIPine (NORVASC) 5 mg tablet Take 1 Tablet by mouth daily. 90 Tablet 2    PARoxetine (PAXIL) 40 mg tablet TAKE 1 TABLET BY MOUTH EVERY DAY 90 Tab 0    timolol (TIMOPTIC) 0.5 % ophthalmic solution INSTILL 1 DROP IN RIGHT EYE IN THE MORNING      omeprazole (PRILOSEC) 20 mg capsule Take 20 mg by mouth daily.        No Known Allergies  Past Medical History:   Diagnosis Date    Headache     Hypertension     Menopause     Subarachnoid hemorrhage (HCC)      Past Surgical History:   Procedure Laterality Date    HX HEENT      HX OTHER SURGICAL Right 1981    eye     Family History   Problem Relation Age of Onset    Macular Degen Mother     Hypertension Father      Social History     Tobacco Use    Smoking status: Never    Smokeless tobacco: Never   Substance Use Topics    Alcohol use: Yes     Comment: 2-4 drinks per week       ROS    Objective:     Patient-Reported Vitals 4/4/2023   Patient-Reported Weight 210.7   Patient-Reported Height -   Patient-Reported Pulse 72   Patient-Reported Temperature -   Patient-Reported Systolic  793   Patient-Reported Diastolic 83        [INSTRUCTIONS:  \"[x]\" Indicates a positive item  \"[]\" Indicates a negative item  -- DELETE ALL ITEMS NOT EXAMINED]    Constitutional: [x] Appears well-developed and well-nourished [x] No apparent distress      [] Abnormal -     Mental status: [x] Alert and awake  [x] Oriented to person/place/time [x] Able to follow commands    [] Abnormal -     Eyes:   EOM    [x]  Normal    [] Abnormal -   Sclera  [x]  Normal    [] Abnormal -          Discharge [x]  None visible   [] Abnormal -     HENT: [x] Normocephalic, atraumatic  [] Abnormal -   [x] Mouth/Throat: Mucous membranes are moist    External Ears [x] Normal  [] Abnormal -    Neck: [x] No visualized mass [] Abnormal -     Pulmonary/Chest: [x] Respiratory effort normal   [x] No visualized signs of difficulty breathing or respiratory distress        [] Abnormal -      Musculoskeletal:   [x] Normal gait with no signs of ataxia         [x] Normal range of motion of neck        [] Abnormal -     Neurological:        [x] No Facial Asymmetry (Cranial nerve 7 motor function) (limited exam due to video visit)          [x] No gaze palsy        [] Abnormal -          Skin:        [x] No significant exanthematous lesions or discoloration noted on facial skin         [] Abnormal -            Psychiatric:       [x] Normal Affect [] Abnormal -        [x] No Hallucinations    Other pertinent observable physical exam findings:-        We discussed the expected course, resolution and complications of the diagnosis(es) in detail. Medication risks, benefits, costs, interactions, and alternatives were discussed as indicated. I advised her to contact the office if her condition worsens, changes or fails to improve as anticipated. She expressed understanding with the diagnosis(es) and plan. Lissette Bowden, was evaluated through a synchronous (real-time) audio-video encounter. The patient (or guardian if applicable) is aware that this is a billable service, which includes applicable co-pays. This Virtual Visit was conducted with patient's (and/or legal guardian's) consent.  The visit was conducted pursuant to the emergency declaration under the [address] [facility], [address] waiver authority and the [facility] and Response Supplemental Appropriations Act. Patient identification was verified, and a caregiver was present when appropriate. The patient was located at: Home: [address] [phone]  The provider was located at: Facility (Appt Department): [address] Nava MD

## 2023-04-16 DIAGNOSIS — I10 ESSENTIAL HYPERTENSION WITH GOAL BLOOD PRESSURE LESS THAN 130/80: ICD-10-CM

## 2023-04-17 RX ORDER — AMLODIPINE BESYLATE 5 MG/1
5 TABLET ORAL DAILY
Qty: 90 TABLET | Refills: 2 | Status: SHIPPED | OUTPATIENT
Start: 2023-04-17

## 2023-05-04 ENCOUNTER — VIRTUAL VISIT (OUTPATIENT)
Dept: INTERNAL MEDICINE CLINIC | Age: 63
End: 2023-05-04
Payer: COMMERCIAL

## 2023-05-04 DIAGNOSIS — E11.00 TYPE 2 DIABETES MELLITUS WITH HYPEROSMOLARITY WITHOUT COMA, WITHOUT LONG-TERM CURRENT USE OF INSULIN (HCC): ICD-10-CM

## 2023-05-04 DIAGNOSIS — Z71.3 WEIGHT LOSS COUNSELING, ENCOUNTER FOR: Primary | ICD-10-CM

## 2023-05-04 PROCEDURE — 3044F HG A1C LEVEL LT 7.0%: CPT | Performed by: FAMILY MEDICINE

## 2023-05-04 PROCEDURE — 99214 OFFICE O/P EST MOD 30 MIN: CPT | Performed by: FAMILY MEDICINE

## 2023-05-04 RX ORDER — SEMAGLUTIDE 1.34 MG/ML
0.5 INJECTION, SOLUTION SUBCUTANEOUS
Qty: 4 BOX | Refills: 3 | Status: SHIPPED | OUTPATIENT
Start: 2023-05-04

## 2023-05-04 RX ORDER — SEMAGLUTIDE 1.34 MG/ML
0.5 INJECTION, SOLUTION SUBCUTANEOUS
Qty: 1 BOX | Refills: 3 | Status: SHIPPED | OUTPATIENT
Start: 2023-05-04 | End: 2023-05-04 | Stop reason: SDUPTHER

## 2023-05-18 RX ORDER — LISINOPRIL 40 MG/1
40 TABLET ORAL DAILY
COMMUNITY
Start: 2023-03-03

## 2023-05-18 RX ORDER — OMEPRAZOLE 20 MG/1
20 CAPSULE, DELAYED RELEASE ORAL DAILY
COMMUNITY

## 2023-05-18 RX ORDER — AMLODIPINE BESYLATE 5 MG/1
5 TABLET ORAL DAILY
COMMUNITY
Start: 2023-04-17

## 2023-05-18 RX ORDER — SEMAGLUTIDE 1.34 MG/ML
0.5 INJECTION, SOLUTION SUBCUTANEOUS
COMMUNITY
Start: 2023-05-04

## 2023-05-18 RX ORDER — TIMOLOL MALEATE 5 MG/ML
SOLUTION/ DROPS OPHTHALMIC
COMMUNITY
Start: 2017-07-28

## 2023-05-18 RX ORDER — ROSUVASTATIN CALCIUM 10 MG/1
1 TABLET, COATED ORAL NIGHTLY
COMMUNITY
Start: 2023-03-17

## 2023-08-03 ENCOUNTER — OFFICE VISIT (OUTPATIENT)
Facility: CLINIC | Age: 63
End: 2023-08-03
Payer: COMMERCIAL

## 2023-08-03 VITALS
OXYGEN SATURATION: 96 % | WEIGHT: 199 LBS | RESPIRATION RATE: 16 BRPM | TEMPERATURE: 98.6 F | DIASTOLIC BLOOD PRESSURE: 82 MMHG | HEART RATE: 77 BPM | SYSTOLIC BLOOD PRESSURE: 120 MMHG | BODY MASS INDEX: 31.23 KG/M2 | HEIGHT: 67 IN

## 2023-08-03 DIAGNOSIS — I10 ESSENTIAL (PRIMARY) HYPERTENSION: ICD-10-CM

## 2023-08-03 DIAGNOSIS — E11.00 TYPE 2 DIABETES MELLITUS WITH HYPEROSMOLARITY WITHOUT NONKETOTIC HYPERGLYCEMIC-HYPEROSMOLAR COMA (NKHHC) (HCC): Primary | ICD-10-CM

## 2023-08-03 LAB
CREAT UR-MCNC: 204 MG/DL
EST. AVERAGE GLUCOSE BLD GHB EST-MCNC: 114 MG/DL
HBA1C MFR BLD: 5.6 % (ref 4–5.6)
MICROALBUMIN UR-MCNC: 1.86 MG/DL
MICROALBUMIN/CREAT UR-RTO: 9 MG/G (ref 0–30)

## 2023-08-03 PROCEDURE — 3078F DIAST BP <80 MM HG: CPT | Performed by: FAMILY MEDICINE

## 2023-08-03 PROCEDURE — 3074F SYST BP LT 130 MM HG: CPT | Performed by: FAMILY MEDICINE

## 2023-08-03 PROCEDURE — 3044F HG A1C LEVEL LT 7.0%: CPT | Performed by: FAMILY MEDICINE

## 2023-08-03 PROCEDURE — 99214 OFFICE O/P EST MOD 30 MIN: CPT | Performed by: FAMILY MEDICINE

## 2023-08-03 RX ORDER — SEMAGLUTIDE 1.34 MG/ML
0.5 INJECTION, SOLUTION SUBCUTANEOUS
Qty: 1 ADJUSTABLE DOSE PRE-FILLED PEN SYRINGE | Refills: 5 | Status: SHIPPED | OUTPATIENT
Start: 2023-08-03

## 2023-08-03 NOTE — PROGRESS NOTES
equal and reactive, extraocular eye movements intact  Ears - bilateral TM's and external ear canals normal  Nose - normal and patent, no erythema, discharge or polyps  Mouth - mucous membranes moist, pharynx normal without lesions  Neck - supple, no significant adenopathy  Chest - clear to auscultation, no wheezes, rales or rhonchi, symmetric air entry  Heart - normal rate, regular rhythm, normal S1, S2, no murmurs, rubs, clicks or gallops  Abdomen - soft, nontender, nondistended, no masses or organomegaly  Musculoskeletal - no joint tenderness, deformity or swelling  Extremities - peripheral pulses normal, no pedal edema, no clubbing or cyanosis  Skin - normal coloration and turgor, no rashes, no suspicious skin lesions noted     Assessment/ Plan:   1. Type 2 diabetes mellitus with hyperosmolarity without nonketotic hyperglycemic-hyperosmolar coma [facility]) ([address] [facility])  -     Microalbumin / Creatinine Urine Ratio; Future  -     Hemoglobin A1C; Future  -      DIABETES FOOT EXAM  -     Semaglutide,0.25 or 0.5MG/DOS, (OZEMPIC, 0.25 OR 0.5 MG/DOSE,) 2 MG/1.5ML SOPN; Inject 0.5 mg into the skin every 7 days, Disp-1 Adjustable Dose Pre-filled Pen Syringe, R-5Normal  2. Essential (primary) hypertension     Return in about 3 months (around 11/3/2023) for Telemedicine F/U, Diabetic Check. .       I have discussed the diagnosis with the patient and the intended plan as seen in the above orders. The patient has received an after-visit summary and questions were answered concerning future plans. Medication Side Effects and Warnings were discussed with patient,  Patient Labs were reviewed and or requested, and  Patient Past Records were reviewed and or requested  Yes         Pt agrees to call or return to clinic and/or go to closest ER with any worsening of symptoms.   This may include, but not limited to increased fever (>100.4) with NSAIDS or Tylenol, increased edema, confusion, rash, worsening of presenting

## 2023-09-05 RX ORDER — LISINOPRIL 40 MG/1
40 TABLET ORAL DAILY
Qty: 90 TABLET | Refills: 0 | Status: SHIPPED | OUTPATIENT
Start: 2023-09-05

## 2023-11-01 RX ORDER — DULAGLUTIDE 0.75 MG/.5ML
0.75 INJECTION, SOLUTION SUBCUTANEOUS WEEKLY
Qty: 4 ADJUSTABLE DOSE PRE-FILLED PEN SYRINGE | Refills: 2 | Status: SHIPPED | OUTPATIENT
Start: 2023-11-01

## 2023-11-03 SDOH — ECONOMIC STABILITY: FOOD INSECURITY: WITHIN THE PAST 12 MONTHS, THE FOOD YOU BOUGHT JUST DIDN'T LAST AND YOU DIDN'T HAVE MONEY TO GET MORE.: NEVER TRUE

## 2023-11-03 SDOH — ECONOMIC STABILITY: HOUSING INSECURITY
IN THE LAST 12 MONTHS, WAS THERE A TIME WHEN YOU DID NOT HAVE A STEADY PLACE TO SLEEP OR SLEPT IN A SHELTER (INCLUDING NOW)?: NO

## 2023-11-03 SDOH — ECONOMIC STABILITY: FOOD INSECURITY: WITHIN THE PAST 12 MONTHS, YOU WORRIED THAT YOUR FOOD WOULD RUN OUT BEFORE YOU GOT MONEY TO BUY MORE.: NEVER TRUE

## 2023-11-03 SDOH — ECONOMIC STABILITY: INCOME INSECURITY: HOW HARD IS IT FOR YOU TO PAY FOR THE VERY BASICS LIKE FOOD, HOUSING, MEDICAL CARE, AND HEATING?: NOT HARD AT ALL

## 2023-11-03 SDOH — ECONOMIC STABILITY: TRANSPORTATION INSECURITY
IN THE PAST 12 MONTHS, HAS LACK OF TRANSPORTATION KEPT YOU FROM MEETINGS, WORK, OR FROM GETTING THINGS NEEDED FOR DAILY LIVING?: NO

## 2023-11-06 ENCOUNTER — TELEMEDICINE (OUTPATIENT)
Facility: CLINIC | Age: 63
End: 2023-11-06
Payer: COMMERCIAL

## 2023-11-06 DIAGNOSIS — B96.89 ACUTE BACTERIAL SINUSITIS: ICD-10-CM

## 2023-11-06 DIAGNOSIS — J01.90 ACUTE BACTERIAL SINUSITIS: ICD-10-CM

## 2023-11-06 DIAGNOSIS — E66.01 MORBID (SEVERE) OBESITY DUE TO EXCESS CALORIES (HCC): ICD-10-CM

## 2023-11-06 DIAGNOSIS — E11.00 TYPE 2 DIABETES MELLITUS WITH HYPEROSMOLARITY WITHOUT NONKETOTIC HYPERGLYCEMIC-HYPEROSMOLAR COMA (NKHHC) (HCC): ICD-10-CM

## 2023-11-06 DIAGNOSIS — E11.9 TYPE 2 DIABETES MELLITUS WITHOUT COMPLICATION, WITHOUT LONG-TERM CURRENT USE OF INSULIN (HCC): Primary | ICD-10-CM

## 2023-11-06 DIAGNOSIS — Z71.3 DIETARY COUNSELING AND SURVEILLANCE: ICD-10-CM

## 2023-11-06 PROCEDURE — 3044F HG A1C LEVEL LT 7.0%: CPT | Performed by: FAMILY MEDICINE

## 2023-11-06 PROCEDURE — 99214 OFFICE O/P EST MOD 30 MIN: CPT | Performed by: FAMILY MEDICINE

## 2023-11-06 RX ORDER — AMOXICILLIN 875 MG/1
875 TABLET, COATED ORAL 2 TIMES DAILY
Qty: 20 TABLET | Refills: 0 | Status: SHIPPED | OUTPATIENT
Start: 2023-11-06 | End: 2023-11-16

## 2023-11-06 RX ORDER — SEMAGLUTIDE 1.34 MG/ML
0.5 INJECTION, SOLUTION SUBCUTANEOUS
Qty: 1 ADJUSTABLE DOSE PRE-FILLED PEN SYRINGE | Refills: 5 | Status: SHIPPED | OUTPATIENT
Start: 2023-11-06

## 2023-11-06 NOTE — PROGRESS NOTES
Ebonie Crawford, was evaluated through a synchronous (real-time) audio-video encounter. The patient (or guardian if applicable) is aware that this is a billable service, which includes applicable co-pays. This Virtual Visit was conducted with patient's (and/or legal guardian's) consent. Patient identification was verified, and a caregiver was present when appropriate. The patient was located at Home: [address] [facility] [address]  Provider was located at [facility] (Appt Dept): [address],  [address] [facility]      Ebonie Crawford (:  1960) is a Established patient, presenting virtually for evaluation of the following:    Assessment & Plan   Below is the assessment and plan developed based on review of pertinent history, physical exam, labs, studies, and medications. 1. Type 2 diabetes mellitus without complication, without long-term current use of insulin ([facility])  -     Hemoglobin A1C; Future  2. Morbid (severe) obesity due to excess calories ([address] [facility])  3. Dietary counseling and surveillance  4. Type 2 diabetes mellitus with hyperosmolarity without nonketotic hyperglycemic-hyperosmolar coma [facility]) ([address] [facility])  -     Semaglutide,0.25 or 0.5MG/DOS, (OZEMPIC, 0.25 OR 0.5 MG/DOSE,) 2 MG/1.5ML SOPN; Inject 0.5 mg into the skin every 7 days, Disp-1 Adjustable Dose Pre-filled Pen Syringe, R-5Normal  5. Acute bacterial sinusitis  -     amoxicillin (AMOXIL) 875 MG tablet; Take 1 tablet by mouth 2 times daily for 10 days, Disp-20 tablet, R-0Normal    Return in about 4 weeks (around 2023) for Telemedicine F/U weight loss. Subjective   Patient is a 58-year-old female who is seen today for follow-up of weight loss and Ozempic. Patient has been taking 0.5 mg of Ozempic and her weight is down to 191 pounds. Denies any side effects from medication and states it has been going well. Patient also states that she is about a week or 2 long history of sinus congestion which worsens towards nighttime.   She is

## 2023-11-14 DIAGNOSIS — E11.9 TYPE 2 DIABETES MELLITUS WITHOUT COMPLICATION, WITHOUT LONG-TERM CURRENT USE OF INSULIN (HCC): ICD-10-CM

## 2023-11-15 LAB
EST. AVERAGE GLUCOSE BLD GHB EST-MCNC: 117 MG/DL
HBA1C MFR BLD: 5.7 % (ref 4–5.6)

## 2023-11-30 RX ORDER — LISINOPRIL 40 MG/1
40 TABLET ORAL DAILY
Qty: 90 TABLET | Refills: 0 | Status: SHIPPED | OUTPATIENT
Start: 2023-11-30

## 2023-12-04 ENCOUNTER — TELEMEDICINE (OUTPATIENT)
Facility: CLINIC | Age: 63
End: 2023-12-04
Payer: COMMERCIAL

## 2023-12-04 DIAGNOSIS — Z71.3 DIETARY COUNSELING AND SURVEILLANCE: ICD-10-CM

## 2023-12-04 DIAGNOSIS — E66.01 MORBID (SEVERE) OBESITY DUE TO EXCESS CALORIES (HCC): ICD-10-CM

## 2023-12-04 DIAGNOSIS — E11.9 TYPE 2 DIABETES MELLITUS WITHOUT COMPLICATION, WITHOUT LONG-TERM CURRENT USE OF INSULIN (HCC): Primary | ICD-10-CM

## 2023-12-04 PROCEDURE — 3044F HG A1C LEVEL LT 7.0%: CPT | Performed by: FAMILY MEDICINE

## 2023-12-04 PROCEDURE — 99214 OFFICE O/P EST MOD 30 MIN: CPT | Performed by: FAMILY MEDICINE

## 2023-12-04 RX ORDER — AMLODIPINE BESYLATE 5 MG/1
5 TABLET ORAL DAILY
Qty: 90 TABLET | Refills: 2 | Status: SHIPPED | OUTPATIENT
Start: 2023-12-04

## 2023-12-04 RX ORDER — ROSUVASTATIN CALCIUM 10 MG/1
10 TABLET, COATED ORAL NIGHTLY
Qty: 90 TABLET | Refills: 1 | Status: SHIPPED | OUTPATIENT
Start: 2023-12-04

## 2023-12-04 ASSESSMENT — PATIENT HEALTH QUESTIONNAIRE - PHQ9
SUM OF ALL RESPONSES TO PHQ9 QUESTIONS 1 & 2: 0
1. LITTLE INTEREST OR PLEASURE IN DOING THINGS: 0
SUM OF ALL RESPONSES TO PHQ QUESTIONS 1-9: 0
2. FEELING DOWN, DEPRESSED OR HOPELESS: 0

## 2023-12-04 NOTE — PROGRESS NOTES
Able to follow commands    [] Abnormal -     Eyes:   EOM    [x]  Normal    [] Abnormal -   Sclera  [x]  Normal    [] Abnormal -          Discharge [x]  None visible   [] Abnormal -     HENT: [x] Normocephalic, atraumatic  [] Abnormal -   [x] Mouth/Throat: Mucous membranes are moist    External Ears [x] Normal  [] Abnormal -    Neck: [x] No visualized mass [] Abnormal -     Pulmonary/Chest: [x] Respiratory effort normal   [x] No visualized signs of difficulty breathing or respiratory distress        [] Abnormal -      Musculoskeletal:   [x] Normal gait with no signs of ataxia         [x] Normal range of motion of neck        [] Abnormal -     Neurological:        [x] No Facial Asymmetry (Cranial nerve 7 motor function) (limited exam due to video visit)          [x] No gaze palsy        [] Abnormal -          Skin:        [x] No significant exanthematous lesions or discoloration noted on facial skin         [] Abnormal -            Psychiatric:       [x] Normal Affect [] Abnormal -        [x] No Hallucinations    Other pertinent observable physical exam findings:-             --Reg Helton MD

## 2024-01-02 ENCOUNTER — TELEMEDICINE (OUTPATIENT)
Facility: CLINIC | Age: 64
End: 2024-01-02
Payer: COMMERCIAL

## 2024-01-02 DIAGNOSIS — R63.4 WEIGHT LOSS: ICD-10-CM

## 2024-01-02 DIAGNOSIS — Z71.3 DIETARY COUNSELING AND SURVEILLANCE: Primary | ICD-10-CM

## 2024-01-02 PROCEDURE — 99214 OFFICE O/P EST MOD 30 MIN: CPT | Performed by: FAMILY MEDICINE

## 2024-01-02 ASSESSMENT — PATIENT HEALTH QUESTIONNAIRE - PHQ9
2. FEELING DOWN, DEPRESSED OR HOPELESS: 0
SUM OF ALL RESPONSES TO PHQ9 QUESTIONS 1 & 2: 0
SUM OF ALL RESPONSES TO PHQ QUESTIONS 1-9: 0
1. LITTLE INTEREST OR PLEASURE IN DOING THINGS: 0
SUM OF ALL RESPONSES TO PHQ QUESTIONS 1-9: 0

## 2024-01-02 NOTE — PROGRESS NOTES
Isi Miller, was evaluated through a synchronous (real-time) audio-video encounter. The patient (or guardian if applicable) is aware that this is a billable service, which includes applicable co-pays. This Virtual Visit was conducted with patient's (and/or legal guardian's) consent. Patient identification was verified, and a caregiver was present when appropriate.   The patient was located at Home: [address] [place] [id number]  Provider was located at Facility (Appt Dept): [address] [place] [id number]      Isi Miller (:  1960) is a Established patient, presenting virtually for evaluation of the following:    Assessment & Plan   Below is the assessment and plan developed based on review of pertinent history, physical exam, labs, studies, and medications.  1. Dietary counseling and surveillance  2. Weight loss  Will go up to Mounjaro 1.7 mg at her next meeting in 4 weeks since she has only given the 1 mg injection x 1 dose so far.  Return in about 4 weeks (around 2024) for Annual Wellness.       Subjective   Patient is a 63-year-old female who is following up on weight loss.  Patient states that she has been taking her medications as directed and has had no side effects with the Mounjaro.  She has only gone up and dose for the past 1 week as she had leftover medication, denies any side effects.  Patient's current weight is about 190 and she would like to continue to use this medication for weight loss.      Review of Systems   All other systems reviewed and are negative.         Objective   Patient-Reported Vitals  No data recorded     Physical Exam  [INSTRUCTIONS:  \"[x]\" Indicates a positive item  \"[]\" Indicates a negative item  -- DELETE ALL ITEMS NOT EXAMINED]    Constitutional: [x] Appears well-developed and well-nourished [x] No apparent distress      [] Abnormal -     Mental status: [x] Alert and awake  [x] Oriented to person/place/time [x] Able to follow commands    []

## 2024-01-24 ENCOUNTER — TRANSCRIBE ORDERS (OUTPATIENT)
Facility: HOSPITAL | Age: 64
End: 2024-01-24

## 2024-01-24 DIAGNOSIS — Z12.31 SCREENING MAMMOGRAM FOR BREAST CANCER: Primary | ICD-10-CM

## 2024-02-01 ENCOUNTER — HOSPITAL ENCOUNTER (OUTPATIENT)
Facility: HOSPITAL | Age: 64
Discharge: HOME | End: 2024-02-01
Attending: FAMILY MEDICINE
Payer: COMMERCIAL

## 2024-02-01 VITALS — BODY MASS INDEX: 31.17 KG/M2 | WEIGHT: 199 LBS

## 2024-02-01 DIAGNOSIS — Z12.31 SCREENING MAMMOGRAM FOR BREAST CANCER: ICD-10-CM

## 2024-02-01 PROCEDURE — 77067 SCR MAMMO BI INCL CAD: CPT

## 2024-02-01 PROCEDURE — 77063 BREAST TOMOSYNTHESIS BI: CPT

## 2024-02-02 ENCOUNTER — OFFICE VISIT (OUTPATIENT)
Facility: CLINIC | Age: 64
End: 2024-02-02

## 2024-02-02 VITALS
HEIGHT: 67 IN | SYSTOLIC BLOOD PRESSURE: 122 MMHG | TEMPERATURE: 98 F | BODY MASS INDEX: 30.76 KG/M2 | DIASTOLIC BLOOD PRESSURE: 86 MMHG | HEART RATE: 78 BPM | RESPIRATION RATE: 16 BRPM | WEIGHT: 196 LBS | OXYGEN SATURATION: 97 %

## 2024-02-02 DIAGNOSIS — R73.03 PREDIABETES: ICD-10-CM

## 2024-02-02 DIAGNOSIS — R63.4 WEIGHT LOSS: ICD-10-CM

## 2024-02-02 DIAGNOSIS — E11.9 TYPE 2 DIABETES MELLITUS WITHOUT COMPLICATION, WITHOUT LONG-TERM CURRENT USE OF INSULIN (HCC): ICD-10-CM

## 2024-02-02 DIAGNOSIS — Z00.00 WELL WOMAN EXAM (NO GYNECOLOGICAL EXAM): Primary | ICD-10-CM

## 2024-02-02 DIAGNOSIS — Z71.3 DIETARY COUNSELING AND SURVEILLANCE: ICD-10-CM

## 2024-02-02 LAB
BASOPHILS # BLD: 0 K/UL (ref 0–0.1)
BASOPHILS NFR BLD: 1 % (ref 0–1)
DIFFERENTIAL METHOD BLD: NORMAL
EOSINOPHIL # BLD: 0.4 K/UL (ref 0–0.4)
EOSINOPHIL NFR BLD: 7 % (ref 0–7)
ERYTHROCYTE [DISTWIDTH] IN BLOOD BY AUTOMATED COUNT: 13.2 % (ref 11.5–14.5)
HCT VFR BLD AUTO: 43 % (ref 35–47)
HGB BLD-MCNC: 13.9 G/DL (ref 11.5–16)
IMM GRANULOCYTES # BLD AUTO: 0 K/UL (ref 0–0.04)
IMM GRANULOCYTES NFR BLD AUTO: 0 % (ref 0–0.5)
LYMPHOCYTES # BLD: 1.6 K/UL (ref 0.8–3.5)
LYMPHOCYTES NFR BLD: 31 % (ref 12–49)
MCH RBC QN AUTO: 30.5 PG (ref 26–34)
MCHC RBC AUTO-ENTMCNC: 32.3 G/DL (ref 30–36.5)
MCV RBC AUTO: 94.3 FL (ref 80–99)
MONOCYTES # BLD: 0.3 K/UL (ref 0–1)
MONOCYTES NFR BLD: 6 % (ref 5–13)
NEUTS SEG # BLD: 2.8 K/UL (ref 1.8–8)
NEUTS SEG NFR BLD: 55 % (ref 32–75)
NRBC # BLD: 0 K/UL (ref 0–0.01)
NRBC BLD-RTO: 0 PER 100 WBC
PLATELET # BLD AUTO: 221 K/UL (ref 150–400)
PMV BLD AUTO: 11.1 FL (ref 8.9–12.9)
RBC # BLD AUTO: 4.56 M/UL (ref 3.8–5.2)
WBC # BLD AUTO: 5.1 K/UL (ref 3.6–11)

## 2024-02-02 NOTE — PROGRESS NOTES
Chief Complaint   Patient presents with    Annual Exam     Patient is here for a wellness visit.      she is a 63 y.o. year old female who presents for CPE  Complete Physical Exam Questions:    LMP -  n/a  Last Pap (q 3 years, or q5 with HPV) - 5 years ago   Last Mammogram (50-74 biennially)- 2/1/2023  Hx of abnl Pap - Unknown    1.  Do you follow a low fat diet?  no  2.  Are you up to date on your Tdap (<10 years)?  Yes  3.  Have you ever had a Pneumovax vaccine (>65)?  No   PCV13 No   PPSV23 No  4.  Have you had Zoster vaccine (>60)? Yes  5.  Have you had the HPV - Gardasil (13- 26)? Not applicable  6.  Do you follow an exercise program?  No  7.  Do you smoke?  No  8.  Do you consider yourself overweight?  No  9.  Is there a family history of CAD< age 50? Yes  10.  Is there a family history of Cancer?  No  11.  Do you know your Cancer risks?  No  12.  Have you had a colonoscopy?  No  13. Have you been tested for HIV or other STI's? No HIV testing today(18-64 y/o)?No  14.  Have had a bone density scan or DEXA done(>65)?No  15.  Have you had an EKG performed in the last five years (>50)?  Yes    Other complaints:    Reviewed and agree with Nurse Note and duplicated in this note.  Reviewed PmHx, RxHx, FmHx, SocHx, AllgHx and updated and dated in the chart.    Family History   Problem Relation Age of Onset    Hypertension Father     Macular Degen Mother        Past Medical History:   Diagnosis Date    Headache     Hypertension     Menopause     Subarachnoid hemorrhage (HCC)       Social History     Socioeconomic History    Marital status:    Tobacco Use    Smoking status: Never    Smokeless tobacco: Never   Substance and Sexual Activity    Alcohol use: Yes    Drug use: No        Review of Systems - negative except as listed above      Objective:     Vitals:    02/02/24 1114   BP: 122/86   Pulse: 78   Resp: 16   Temp: 98 °F (36.7 °C)   SpO2: 97%   Weight: 88.9 kg (196 lb)   Height: 1.702 m (5' 7\")       Physical

## 2024-02-03 LAB
ALBUMIN SERPL-MCNC: 4 G/DL (ref 3.5–5)
ALBUMIN/GLOB SERPL: 1.4 (ref 1.1–2.2)
ALP SERPL-CCNC: 94 U/L (ref 45–117)
ALT SERPL-CCNC: 47 U/L (ref 12–78)
ANION GAP SERPL CALC-SCNC: 2 MMOL/L (ref 5–15)
AST SERPL-CCNC: 25 U/L (ref 15–37)
BILIRUB SERPL-MCNC: 0.4 MG/DL (ref 0.2–1)
BUN SERPL-MCNC: 14 MG/DL (ref 6–20)
BUN/CREAT SERPL: 15 (ref 12–20)
CALCIUM SERPL-MCNC: 9 MG/DL (ref 8.5–10.1)
CHLORIDE SERPL-SCNC: 112 MMOL/L (ref 97–108)
CHOLEST SERPL-MCNC: 132 MG/DL
CO2 SERPL-SCNC: 28 MMOL/L (ref 21–32)
CREAT SERPL-MCNC: 0.91 MG/DL (ref 0.55–1.02)
CREAT UR-MCNC: 112 MG/DL
EST. AVERAGE GLUCOSE BLD GHB EST-MCNC: 114 MG/DL
GLOBULIN SER CALC-MCNC: 2.9 G/DL (ref 2–4)
GLUCOSE SERPL-MCNC: 116 MG/DL (ref 65–100)
HBA1C MFR BLD: 5.6 % (ref 4–5.6)
HDLC SERPL-MCNC: 49 MG/DL
HDLC SERPL: 2.7 (ref 0–5)
LDLC SERPL CALC-MCNC: 64.4 MG/DL (ref 0–100)
MICROALBUMIN UR-MCNC: 0.63 MG/DL
MICROALBUMIN/CREAT UR-RTO: 6 MG/G (ref 0–30)
POTASSIUM SERPL-SCNC: 4.6 MMOL/L (ref 3.5–5.1)
PROT SERPL-MCNC: 6.9 G/DL (ref 6.4–8.2)
SODIUM SERPL-SCNC: 142 MMOL/L (ref 136–145)
TRIGL SERPL-MCNC: 93 MG/DL
VLDLC SERPL CALC-MCNC: 18.6 MG/DL

## 2024-02-28 RX ORDER — LISINOPRIL 40 MG/1
40 TABLET ORAL DAILY
Qty: 90 TABLET | Refills: 1 | Status: SHIPPED | OUTPATIENT
Start: 2024-02-28

## 2024-02-28 RX ORDER — ROSUVASTATIN CALCIUM 10 MG/1
10 TABLET, COATED ORAL NIGHTLY
Qty: 90 TABLET | Refills: 1 | Status: SHIPPED | OUTPATIENT
Start: 2024-02-28

## 2024-06-06 ENCOUNTER — HOSPITAL ENCOUNTER (EMERGENCY)
Facility: HOSPITAL | Age: 64
Discharge: HOME | End: 2024-06-06
Attending: EMERGENCY MEDICINE
Payer: COMMERCIAL

## 2024-06-06 VITALS
BODY MASS INDEX: 29.82 KG/M2 | HEIGHT: 67 IN | WEIGHT: 190 LBS | SYSTOLIC BLOOD PRESSURE: 134 MMHG | HEART RATE: 78 BPM | TEMPERATURE: 98.4 F | DIASTOLIC BLOOD PRESSURE: 95 MMHG | RESPIRATION RATE: 15 BRPM | OXYGEN SATURATION: 96 %

## 2024-06-06 DIAGNOSIS — S41.112A LACERATION OF LEFT UPPER EXTREMITY, INITIAL ENCOUNTER: Primary | ICD-10-CM

## 2024-06-06 PROCEDURE — 12001 RPR S/N/AX/GEN/TRNK 2.5CM/<: CPT

## 2024-06-06 PROCEDURE — 99284 EMERGENCY DEPT VISIT MOD MDM: CPT

## 2024-06-06 PROCEDURE — 90471 IMMUNIZATION ADMIN: CPT | Performed by: EMERGENCY MEDICINE

## 2024-06-06 PROCEDURE — 2500000003 HC RX 250 WO HCPCS: Performed by: EMERGENCY MEDICINE

## 2024-06-06 PROCEDURE — 90715 TDAP VACCINE 7 YRS/> IM: CPT | Performed by: EMERGENCY MEDICINE

## 2024-06-06 PROCEDURE — 6360000002 HC RX W HCPCS: Performed by: EMERGENCY MEDICINE

## 2024-06-06 RX ORDER — BACITRACIN ZINC 500 [USP'U]/G
OINTMENT TOPICAL ONCE
Status: DISCONTINUED | OUTPATIENT
Start: 2024-06-06 | End: 2024-06-06 | Stop reason: HOSPADM

## 2024-06-06 RX ORDER — CEPHALEXIN 500 MG/1
500 CAPSULE ORAL 3 TIMES DAILY
Qty: 12 CAPSULE | Refills: 0 | Status: SHIPPED | OUTPATIENT
Start: 2024-06-06 | End: 2024-06-10

## 2024-06-06 RX ORDER — BACITRACIN ZINC 500 [USP'U]/G
OINTMENT TOPICAL 2 TIMES DAILY
Status: DISCONTINUED | OUTPATIENT
Start: 2024-06-06 | End: 2024-06-06

## 2024-06-06 RX ORDER — LIDOCAINE HYDROCHLORIDE AND EPINEPHRINE 10; 10 MG/ML; UG/ML
20 INJECTION, SOLUTION INFILTRATION; PERINEURAL ONCE
Status: COMPLETED | OUTPATIENT
Start: 2024-06-06 | End: 2024-06-06

## 2024-06-06 RX ADMIN — LIDOCAINE HYDROCHLORIDE,EPINEPHRINE BITARTRATE 20 ML: 10; .01 INJECTION, SOLUTION INFILTRATION; PERINEURAL at 18:37

## 2024-06-06 RX ADMIN — TETANUS TOXOID, REDUCED DIPHTHERIA TOXOID AND ACELLULAR PERTUSSIS VACCINE, ADSORBED 0.5 ML: 5; 2.5; 8; 8; 2.5 SUSPENSION INTRAMUSCULAR at 17:15

## 2024-06-06 ASSESSMENT — PAIN SCALES - GENERAL: PAINLEVEL_OUTOF10: 0

## 2024-06-06 ASSESSMENT — LIFESTYLE VARIABLES
HOW OFTEN DO YOU HAVE A DRINK CONTAINING ALCOHOL: NEVER
HOW MANY STANDARD DRINKS CONTAINING ALCOHOL DO YOU HAVE ON A TYPICAL DAY: PATIENT DOES NOT DRINK

## 2024-06-06 ASSESSMENT — PAIN - FUNCTIONAL ASSESSMENT: PAIN_FUNCTIONAL_ASSESSMENT: 0-10

## 2024-06-06 NOTE — ED PROVIDER NOTES
EMERGENCY DEPARTMENT HISTORY AND PHYSICAL EXAM      Date: 6/6/2024  Patient Name: Isi Miller    History of Presenting Illness     Chief Complaint   Patient presents with    Laceration       History Provided By: Patient    HPI: Isi Miller, 63 y.o. female with PMHx as noted below presents the emergency department for evaluation of left arm laceration.  Patient cut her arm on a nail just prior to arrival.  Unsure of last tetanus immunization.    PCP: Fredi Vaz MD    No current facility-administered medications for this encounter.     Current Outpatient Medications   Medication Sig Dispense Refill    cephALEXin (KEFLEX) 500 MG capsule Take 1 capsule by mouth 3 times daily for 4 days 12 capsule 0    lisinopril (PRINIVIL;ZESTRIL) 40 MG tablet TAKE 1 TABLET BY MOUTH DAILY 90 tablet 1    rosuvastatin (CRESTOR) 10 MG tablet TAKE 1 TABLET BY MOUTH EVERY NIGHT 90 tablet 1    Semaglutide, 1 MG/DOSE, 4 MG/3ML SOPN Inject 1 mg into the skin once a week 3 mL 5    amLODIPine (NORVASC) 5 MG tablet Take 1 tablet by mouth daily 90 tablet 2    omeprazole (PRILOSEC) 20 MG delayed release capsule Take 1 capsule by mouth daily      timolol (TIMOPTIC) 0.5 % ophthalmic solution INSTILL 1 DROP IN RIGHT EYE IN THE MORNING         Past History     Past Medical History:  Past Medical History:   Diagnosis Date    Headache     Hypertension     Menopause     Subarachnoid hemorrhage (HCC)        Past Surgical History:  Past Surgical History:   Procedure Laterality Date    HEENT      OTHER SURGICAL HISTORY Right 1981    eye       Family History:  Family History   Problem Relation Age of Onset    Hypertension Father     Macular Degen Mother        Social History:  Social History     Tobacco Use    Smoking status: Never    Smokeless tobacco: Never   Substance Use Topics    Alcohol use: Yes    Drug use: No       Allergies:  No Known Allergies      Review of Systems   Review of Systems    Physical Exam    Musculoskeletal: Laceration to left

## 2024-06-06 NOTE — ED NOTES
Pt continues to await suturing by ED Provider. Resting comfortably in bed at this time, no other needs expressed.

## 2024-06-06 NOTE — ED TRIAGE NOTES
Pt arrived with laceration to her left arm,  pt reports she cut her arm on an armoire.  Pt denies being on blood thinners and is not sure of her tetanus status.  Pt is awake alert and oriented X 4,  pt educated on ER flow

## 2024-06-10 RX ORDER — ROSUVASTATIN CALCIUM 10 MG/1
10 TABLET, COATED ORAL NIGHTLY
Qty: 90 TABLET | Refills: 1 | Status: SHIPPED | OUTPATIENT
Start: 2024-06-10

## 2024-06-14 ENCOUNTER — HOSPITAL ENCOUNTER (EMERGENCY)
Facility: HOSPITAL | Age: 64
Discharge: HOME | End: 2024-06-14
Attending: EMERGENCY MEDICINE

## 2024-06-14 VITALS
HEART RATE: 68 BPM | RESPIRATION RATE: 18 BRPM | SYSTOLIC BLOOD PRESSURE: 134 MMHG | DIASTOLIC BLOOD PRESSURE: 78 MMHG | OXYGEN SATURATION: 100 % | TEMPERATURE: 97.9 F

## 2024-06-14 DIAGNOSIS — Z51.89 VISIT FOR WOUND CHECK: Primary | ICD-10-CM

## 2024-06-14 ASSESSMENT — PAIN - FUNCTIONAL ASSESSMENT: PAIN_FUNCTIONAL_ASSESSMENT: NONE - DENIES PAIN

## 2024-06-14 ASSESSMENT — LIFESTYLE VARIABLES
HOW MANY STANDARD DRINKS CONTAINING ALCOHOL DO YOU HAVE ON A TYPICAL DAY: PATIENT DOES NOT DRINK
HOW OFTEN DO YOU HAVE A DRINK CONTAINING ALCOHOL: NEVER

## 2024-06-15 NOTE — ED PROVIDER NOTES
Recommend patient make sure wound is dry and monitor for signs of infection.  Patient to return to ED for final suture removal.         FINAL IMPRESSION     1. Visit for wound check          DISPOSITION/PLAN   Isi Miller's  results have been reviewed with her.  She has been counseled regarding her diagnosis, treatment, and plan.  She verbally conveys understanding and agreement of the signs, symptoms, diagnosis, treatment and prognosis and additionally agrees to follow up as discussed.  She also agrees with the care-plan and conveys that all of her questions have been answered.  I have also provided discharge instructions for her that include: educational information regarding their diagnosis and treatment, and list of reasons why they would want to return to the ED prior to their follow-up appointment, should her condition change.     CLINICAL IMPRESSION    Discharge Note: The patient is stable for discharge home. The signs, symptoms, diagnosis, and discharge instructions have been discussed, understanding conveyed, and agreed upon. The patient is to follow up as recommended or return to ER should their symptoms worsen.      PATIENT REFERRED TO:  [facility] EMERGENCY DEP  [address] [facility] [address] [phone]  In 3 days  For suture removal       DISCHARGE MEDICATIONS:     Medication List        ASK your doctor about these medications      amLODIPine 5 MG tablet  Commonly known as: NORVASC  Take 1 tablet by mouth daily     lisinopril 40 MG tablet  Commonly known as: PRINIVIL;ZESTRIL  TAKE 1 TABLET BY MOUTH DAILY     omeprazole 20 MG delayed release capsule  Commonly known as: PRILOSEC     rosuvastatin 10 MG tablet  Commonly known as: CRESTOR  Take 1 tablet by mouth nightly     Semaglutide (1 MG/DOSE) 4 MG/3ML Sopn sc injection  Commonly known as: OZEMPIC  Inject 1 mg into the skin once a week     timolol 0.5 % ophthalmic solution  Commonly known as: TIMOPTIC                DISCONTINUED

## 2024-06-28 ENCOUNTER — TELEMEDICINE (OUTPATIENT)
Facility: CLINIC | Age: 64
End: 2024-06-28
Payer: COMMERCIAL

## 2024-06-28 DIAGNOSIS — S06.6X9S SUBARACHNOID HEMORRHAGE FOLLOWING INJURY, WITH LOSS OF CONSCIOUSNESS, SEQUELA (HCC): ICD-10-CM

## 2024-06-28 DIAGNOSIS — S06.6XAD TRAUMATIC SUBARACHNOID HEMORRHAGE WITH UNKNOWN LOSS OF CONSCIOUSNESS STATUS, SUBSEQUENT ENCOUNTER: ICD-10-CM

## 2024-06-28 DIAGNOSIS — U07.1 COVID-19: Primary | ICD-10-CM

## 2024-06-28 PROCEDURE — 99214 OFFICE O/P EST MOD 30 MIN: CPT | Performed by: FAMILY MEDICINE

## 2024-06-28 NOTE — PROGRESS NOTES
practitioner observation)    Blood pressure-  Heart rate-    Respiratory rate-    Temperature-  Pulse oximetry-   Physical Exam       Constitutional: [x] Appears well-developed and well-nourished [] No apparent distress      [] Abnormal-   Mental status  [x] Alert and awake  [x] Oriented to person/place/time []Able to follow commands      Eyes:  EOM    [x]  Normal  [] Abnormal-  Sclera  [x]  Normal  [] Abnormal -         Discharge [x]  None visible  [] Abnormal -    HENT:   [x] Normocephalic, atraumatic.  [] Abnormal   [x] Mouth/Throat: Mucous membranes are moist.     External Ears [x] Normal  [] Abnormal-     Neck: [x] No visualized mass     Pulmonary/Chest: [x] Respiratory effort normal.  [x] No visualized signs of difficulty breathing or respiratory distress        [] Abnormal-      Musculoskeletal:   [x] Normal gait with no signs of ataxia         [x] Normal range of motion of neck        [] Abnormal-       Neurological:        [x] No Facial Asymmetry (Cranial nerve 7 motor function) (limited exam to video visit)          [x] No gaze palsy        [] Abnormal-         Skin:        [x] No significant exanthematous lesions or discoloration noted on facial skin         [] Abnormal-            Psychiatric:       [x] Normal Affect [] No Hallucinations        [] Abnormal-     Other pertinent observable physical exam findings-     ASSESSMENT/PLAN:  1. COVID-19      2. Subarachnoid hemorrhage following injury, with loss of consciousness, sequela (HCC)      Assessment & Plan  1. COVID-19 positive testing.  Paxlovid has been prescribed. Should there be any changes in her condition, she is advised to notify us immediately. However, should the symptoms persist over the weekend, a consultation with a specialist is recommended.    No follow-ups on file.    Isi Miller, was evaluated through a synchronous (real-time) audio-video encounter. The patient (or guardian if applicable) is aware that this is a billable service, which

## 2024-08-21 RX ORDER — LISINOPRIL 40 MG/1
40 TABLET ORAL DAILY
Qty: 90 TABLET | Refills: 1 | Status: SHIPPED | OUTPATIENT
Start: 2024-08-21

## 2024-11-01 RX ORDER — AMLODIPINE BESYLATE 5 MG/1
5 TABLET ORAL DAILY
Qty: 90 TABLET | Refills: 2 | Status: SHIPPED | OUTPATIENT
Start: 2024-11-01

## 2024-11-10 DIAGNOSIS — E11.9 TYPE 2 DIABETES MELLITUS WITHOUT COMPLICATION, WITHOUT LONG-TERM CURRENT USE OF INSULIN (HCC): ICD-10-CM

## 2024-12-02 RX ORDER — ROSUVASTATIN CALCIUM 10 MG/1
10 TABLET, COATED ORAL NIGHTLY
Qty: 90 TABLET | Refills: 1 | Status: SHIPPED | OUTPATIENT
Start: 2024-12-02

## 2025-01-06 SDOH — ECONOMIC STABILITY: INCOME INSECURITY: HOW HARD IS IT FOR YOU TO PAY FOR THE VERY BASICS LIKE FOOD, HOUSING, MEDICAL CARE, AND HEATING?: NOT HARD AT ALL

## 2025-01-06 SDOH — ECONOMIC STABILITY: FOOD INSECURITY: WITHIN THE PAST 12 MONTHS, THE FOOD YOU BOUGHT JUST DIDN'T LAST AND YOU DIDN'T HAVE MONEY TO GET MORE.: NEVER TRUE

## 2025-01-06 SDOH — ECONOMIC STABILITY: FOOD INSECURITY: WITHIN THE PAST 12 MONTHS, YOU WORRIED THAT YOUR FOOD WOULD RUN OUT BEFORE YOU GOT MONEY TO BUY MORE.: NEVER TRUE

## 2025-01-07 ENCOUNTER — TELEMEDICINE (OUTPATIENT)
Facility: CLINIC | Age: 65
End: 2025-01-07
Payer: MEDICARE

## 2025-01-07 DIAGNOSIS — I10 ESSENTIAL (PRIMARY) HYPERTENSION: ICD-10-CM

## 2025-01-07 DIAGNOSIS — E78.5 HYPERLIPIDEMIA, UNSPECIFIED HYPERLIPIDEMIA TYPE: ICD-10-CM

## 2025-01-07 DIAGNOSIS — E11.9 TYPE 2 DIABETES MELLITUS WITHOUT COMPLICATION, WITHOUT LONG-TERM CURRENT USE OF INSULIN (HCC): Primary | ICD-10-CM

## 2025-01-07 PROCEDURE — 99214 OFFICE O/P EST MOD 30 MIN: CPT | Performed by: FAMILY MEDICINE

## 2025-01-07 PROCEDURE — 1123F ACP DISCUSS/DSCN MKR DOCD: CPT | Performed by: FAMILY MEDICINE

## 2025-01-07 PROCEDURE — 3046F HEMOGLOBIN A1C LEVEL >9.0%: CPT | Performed by: FAMILY MEDICINE

## 2025-01-07 PROCEDURE — G8400 PT W/DXA NO RESULTS DOC: HCPCS | Performed by: FAMILY MEDICINE

## 2025-01-07 PROCEDURE — 3017F COLORECTAL CA SCREEN DOC REV: CPT | Performed by: FAMILY MEDICINE

## 2025-01-07 PROCEDURE — G8428 CUR MEDS NOT DOCUMENT: HCPCS | Performed by: FAMILY MEDICINE

## 2025-01-07 PROCEDURE — 1090F PRES/ABSN URINE INCON ASSESS: CPT | Performed by: FAMILY MEDICINE

## 2025-01-07 PROCEDURE — 2022F DILAT RTA XM EVC RTNOPTHY: CPT | Performed by: FAMILY MEDICINE

## 2025-01-07 NOTE — PROGRESS NOTES
2025    TELEHEALTH EVALUATION -- Audio/Visual    HPI:    Isi Miller (:  1960) has requested an audio/video evaluation for the following concern(s):  History of Present Illness  The patient is a 64-year-old female following up on diabetes. The patient has been taking Ozempic at 1 mg with a weight loss of 6 pounds and has good glycemic control. The patient has not been rechecked with her blood work recently and would like to get the numbers rechecked that and for her cholesterol also. She is working on current diet and exercise changes and has had no side effects to the Ozempic. The patient will return to clinic for blood work next week and in 2025 for her well woman exam.    Hypertension - well controlled on lisinopril .  No headaches or dizziness.  checking BP at home.  Diabetes -       Review of Systems   All other systems reviewed and are negative.      Prior to Visit Medications    Medication Sig Taking? Authorizing Provider   rosuvastatin (CRESTOR) 10 MG tablet Take 1 tablet by mouth nightly  Fredi Vaz MD   Semaglutide, 1 MG/DOSE, (OZEMPIC) 4 MG/3ML SOPN sc injection Inject 1 mg into the skin once a week  Fredi Vaz MD   amLODIPine (NORVASC) 5 MG tablet TAKE 1 TABLET BY MOUTH DAILY  Fredi Vaz MD   lisinopril (PRINIVIL;ZESTRIL) 40 MG tablet Take 1 tablet by mouth daily  Fredi Vaz MD   omeprazole (PRILOSEC) 20 MG delayed release capsule Take 1 capsule by mouth daily  Automatic Reconciliation, Ar   timolol (TIMOPTIC) 0.5 % ophthalmic solution INSTILL 1 DROP IN RIGHT EYE IN THE MORNING  Automatic Reconciliation, Ar       Social History     Tobacco Use    Smoking status: Never    Smokeless tobacco: Never   Substance Use Topics    Alcohol use: Yes    Drug use: No        No Known Allergies,   Past Medical History:   Diagnosis Date    Headache     Hypertension     Menopause     Subarachnoid hemorrhage (HCC)    ,   Past Surgical History:   Procedure Laterality Date    HEENT      OTHER

## 2025-01-14 ENCOUNTER — NURSE ONLY (OUTPATIENT)
Facility: CLINIC | Age: 65
End: 2025-01-14

## 2025-01-14 DIAGNOSIS — E11.9 TYPE 2 DIABETES MELLITUS WITHOUT COMPLICATION, WITHOUT LONG-TERM CURRENT USE OF INSULIN (HCC): ICD-10-CM

## 2025-01-14 DIAGNOSIS — E78.5 HYPERLIPIDEMIA, UNSPECIFIED HYPERLIPIDEMIA TYPE: ICD-10-CM

## 2025-01-14 DIAGNOSIS — I10 ESSENTIAL (PRIMARY) HYPERTENSION: ICD-10-CM

## 2025-01-15 ENCOUNTER — TRANSCRIBE ORDERS (OUTPATIENT)
Facility: HOSPITAL | Age: 65
End: 2025-01-15

## 2025-01-15 DIAGNOSIS — Z12.31 SCREENING MAMMOGRAM FOR BREAST CANCER: Primary | ICD-10-CM

## 2025-01-17 LAB
ALBUMIN SERPL-MCNC: 3.9 G/DL (ref 3.5–5)
ALBUMIN/GLOB SERPL: 1.3 (ref 1.1–2.2)
ALP SERPL-CCNC: 85 U/L (ref 45–117)
ALT SERPL-CCNC: 39 U/L (ref 12–78)
ANION GAP SERPL CALC-SCNC: 4 MMOL/L (ref 2–12)
AST SERPL-CCNC: 26 U/L (ref 15–37)
BILIRUB SERPL-MCNC: 0.5 MG/DL (ref 0.2–1)
BUN SERPL-MCNC: 16 MG/DL (ref 6–20)
BUN/CREAT SERPL: 19 (ref 12–20)
CALCIUM SERPL-MCNC: 9.6 MG/DL (ref 8.5–10.1)
CHLORIDE SERPL-SCNC: 111 MMOL/L (ref 97–108)
CHOLEST SERPL-MCNC: 135 MG/DL
CO2 SERPL-SCNC: 28 MMOL/L (ref 21–32)
CREAT SERPL-MCNC: 0.84 MG/DL (ref 0.55–1.02)
CREAT UR-MCNC: 180 MG/DL
EST. AVERAGE GLUCOSE BLD GHB EST-MCNC: 117 MG/DL
GLOBULIN SER CALC-MCNC: 2.9 G/DL (ref 2–4)
GLUCOSE SERPL-MCNC: 107 MG/DL (ref 65–100)
HBA1C MFR BLD: 5.7 % (ref 4–5.6)
HDLC SERPL-MCNC: 61 MG/DL
HDLC SERPL: 2.2 (ref 0–5)
LDLC SERPL CALC-MCNC: 57 MG/DL (ref 0–100)
MICROALBUMIN UR-MCNC: 1.19 MG/DL
MICROALBUMIN/CREAT UR-RTO: 7 MG/G (ref 0–30)
POTASSIUM SERPL-SCNC: 4.7 MMOL/L (ref 3.5–5.1)
PROT SERPL-MCNC: 6.8 G/DL (ref 6.4–8.2)
SODIUM SERPL-SCNC: 143 MMOL/L (ref 136–145)
TRIGL SERPL-MCNC: 85 MG/DL
VLDLC SERPL CALC-MCNC: 17 MG/DL

## 2025-01-22 ENCOUNTER — TELEPHONE (OUTPATIENT)
Facility: CLINIC | Age: 65
End: 2025-01-22

## 2025-02-04 ENCOUNTER — HOSPITAL ENCOUNTER (OUTPATIENT)
Facility: HOSPITAL | Age: 65
Discharge: HOME | End: 2025-02-07
Attending: FAMILY MEDICINE
Payer: MEDICARE

## 2025-02-04 DIAGNOSIS — Z12.31 SCREENING MAMMOGRAM FOR BREAST CANCER: ICD-10-CM

## 2025-02-04 PROCEDURE — 77063 BREAST TOMOSYNTHESIS BI: CPT

## 2025-02-13 RX ORDER — LISINOPRIL 40 MG/1
40 TABLET ORAL DAILY
Qty: 90 TABLET | Refills: 1 | Status: SHIPPED | OUTPATIENT
Start: 2025-02-13

## 2025-02-17 DIAGNOSIS — E11.9 TYPE 2 DIABETES MELLITUS WITHOUT COMPLICATION, WITHOUT LONG-TERM CURRENT USE OF INSULIN (HCC): ICD-10-CM

## 2025-03-09 SDOH — ECONOMIC STABILITY: FOOD INSECURITY: WITHIN THE PAST 12 MONTHS, THE FOOD YOU BOUGHT JUST DIDN'T LAST AND YOU DIDN'T HAVE MONEY TO GET MORE.: NEVER TRUE

## 2025-03-09 SDOH — ECONOMIC STABILITY: INCOME INSECURITY: IN THE LAST 12 MONTHS, WAS THERE A TIME WHEN YOU WERE NOT ABLE TO PAY THE MORTGAGE OR RENT ON TIME?: NO

## 2025-03-09 SDOH — ECONOMIC STABILITY: FOOD INSECURITY: WITHIN THE PAST 12 MONTHS, YOU WORRIED THAT YOUR FOOD WOULD RUN OUT BEFORE YOU GOT MONEY TO BUY MORE.: NEVER TRUE

## 2025-03-09 SDOH — ECONOMIC STABILITY: TRANSPORTATION INSECURITY
IN THE PAST 12 MONTHS, HAS THE LACK OF TRANSPORTATION KEPT YOU FROM MEDICAL APPOINTMENTS OR FROM GETTING MEDICATIONS?: NO

## 2025-03-10 ENCOUNTER — OFFICE VISIT (OUTPATIENT)
Facility: CLINIC | Age: 65
End: 2025-03-10

## 2025-03-10 VITALS
TEMPERATURE: 98.2 F | HEART RATE: 78 BPM | RESPIRATION RATE: 16 BRPM | SYSTOLIC BLOOD PRESSURE: 139 MMHG | HEIGHT: 67 IN | WEIGHT: 185 LBS | DIASTOLIC BLOOD PRESSURE: 90 MMHG | BODY MASS INDEX: 29.03 KG/M2

## 2025-03-10 DIAGNOSIS — I10 ESSENTIAL (PRIMARY) HYPERTENSION: ICD-10-CM

## 2025-03-10 DIAGNOSIS — E11.9 TYPE 2 DIABETES MELLITUS WITHOUT COMPLICATION, WITHOUT LONG-TERM CURRENT USE OF INSULIN (HCC): ICD-10-CM

## 2025-03-10 DIAGNOSIS — Z00.00 WELCOME TO MEDICARE PREVENTIVE VISIT: Primary | ICD-10-CM

## 2025-03-10 DIAGNOSIS — Z78.0 ASYMPTOMATIC MENOPAUSAL STATE: ICD-10-CM

## 2025-03-10 DIAGNOSIS — E78.5 HYPERLIPIDEMIA, UNSPECIFIED HYPERLIPIDEMIA TYPE: ICD-10-CM

## 2025-03-10 PROBLEM — S06.6XAA SUBARACHNOID HEMORRHAGE FOLLOWING INJURY (HCC): Status: RESOLVED | Noted: 2019-01-10 | Resolved: 2025-03-10

## 2025-03-10 ASSESSMENT — LIFESTYLE VARIABLES
HOW MANY STANDARD DRINKS CONTAINING ALCOHOL DO YOU HAVE ON A TYPICAL DAY: 1 OR 2
HOW OFTEN DO YOU HAVE A DRINK CONTAINING ALCOHOL: 2-4 TIMES A MONTH

## 2025-03-10 ASSESSMENT — PATIENT HEALTH QUESTIONNAIRE - PHQ9
SUM OF ALL RESPONSES TO PHQ QUESTIONS 1-9: 0
2. FEELING DOWN, DEPRESSED OR HOPELESS: NOT AT ALL
1. LITTLE INTEREST OR PLEASURE IN DOING THINGS: NOT AT ALL
SUM OF ALL RESPONSES TO PHQ QUESTIONS 1-9: 0

## 2025-03-10 NOTE — PROGRESS NOTES
Ozempic.        Reviewed and agree with Nurse Note and duplicated in this note.  Reviewed PmHx, RxHx, FmHx, SocHx, AllgHx and updated and dated in the chart.    Family History   Problem Relation Age of Onset    Hypertension Father     High Blood Pressure Father     Macular Degen Mother        Past Medical History:   Diagnosis Date    Headache     Hypertension     Menopause     Subarachnoid hemorrhage (HCC)       Social History     Socioeconomic History    Marital status:      Spouse name: None    Number of children: None    Years of education: None    Highest education level: None   Tobacco Use    Smoking status: Never    Smokeless tobacco: Never   Substance and Sexual Activity    Alcohol use: Yes     Alcohol/week: 3.0 standard drinks of alcohol     Types: 1 Glasses of wine, 2 Cans of beer per week     Comment: Minimal use    Drug use: No    Sexual activity: Yes     Partners: Female     Birth control/protection: None     Social Drivers of Health     Food Insecurity: No Food Insecurity (3/9/2025)    Hunger Vital Sign     Worried About Running Out of Food in the Last Year: Never true     Ran Out of Food in the Last Year: Never true   Transportation Needs: No Transportation Needs (3/9/2025)    PRAPARE - Transportation     Lack of Transportation (Medical): No     Lack of Transportation (Non-Medical): No   Physical Activity: Insufficiently Active (3/10/2025)    Exercise Vital Sign     Days of Exercise per Week: 3 days     Minutes of Exercise per Session: 30 min   Housing Stability: Low Risk  (3/9/2025)    Housing Stability Vital Sign     Unable to Pay for Housing in the Last Year: No     Number of Times Moved in the Last Year: 0     Homeless in the Last Year: No        Review of Systems - negative except as listed above      Objective:     Vitals:    03/10/25 1334 03/10/25 1346   BP: (!) 115/54 (!) 139/90   Pulse: 78 78   Resp: 16    Temp: 98.2 °F (36.8 °C)    Weight: 83.9 kg (185 lb)    Height: 1.702 m (5' 7\")

## 2025-03-12 DIAGNOSIS — E11.9 TYPE 2 DIABETES MELLITUS WITHOUT COMPLICATION, WITHOUT LONG-TERM CURRENT USE OF INSULIN (HCC): ICD-10-CM

## 2025-04-11 ENCOUNTER — HOSPITAL ENCOUNTER (OUTPATIENT)
Facility: HOSPITAL | Age: 65
Discharge: HOME | End: 2025-04-14
Attending: FAMILY MEDICINE
Payer: MEDICARE

## 2025-04-11 DIAGNOSIS — Z78.0 ASYMPTOMATIC MENOPAUSAL STATE: ICD-10-CM

## 2025-04-11 PROCEDURE — 77080 DXA BONE DENSITY AXIAL: CPT

## 2025-05-20 ENCOUNTER — CLINICAL SUPPORT (OUTPATIENT)
Facility: CLINIC | Age: 65
End: 2025-05-20

## 2025-05-20 DIAGNOSIS — E78.5 HYPERLIPIDEMIA, UNSPECIFIED HYPERLIPIDEMIA TYPE: ICD-10-CM

## 2025-05-20 DIAGNOSIS — E11.9 TYPE 2 DIABETES MELLITUS WITHOUT COMPLICATION, WITHOUT LONG-TERM CURRENT USE OF INSULIN (HCC): ICD-10-CM

## 2025-05-20 DIAGNOSIS — I10 ESSENTIAL (PRIMARY) HYPERTENSION: ICD-10-CM

## 2025-05-21 RX ORDER — ROSUVASTATIN CALCIUM 10 MG/1
10 TABLET, COATED ORAL NIGHTLY
Qty: 90 TABLET | Refills: 1 | Status: SHIPPED | OUTPATIENT
Start: 2025-05-21

## 2025-05-22 ENCOUNTER — RESULTS FOLLOW-UP (OUTPATIENT)
Facility: CLINIC | Age: 65
End: 2025-05-22

## 2025-05-22 LAB
ALBUMIN SERPL-MCNC: 4.1 G/DL (ref 3.5–5)
ALBUMIN/GLOB SERPL: 1.4 (ref 1.1–2.2)
ALP SERPL-CCNC: 97 U/L (ref 45–117)
ALT SERPL-CCNC: 37 U/L (ref 12–78)
ANION GAP SERPL CALC-SCNC: 4 MMOL/L (ref 2–12)
AST SERPL-CCNC: 25 U/L (ref 15–37)
BILIRUB SERPL-MCNC: 0.6 MG/DL (ref 0.2–1)
BUN SERPL-MCNC: 19 MG/DL (ref 6–20)
BUN/CREAT SERPL: 20 (ref 12–20)
CALCIUM SERPL-MCNC: 9.5 MG/DL (ref 8.5–10.1)
CHLORIDE SERPL-SCNC: 109 MMOL/L (ref 97–108)
CHOLEST SERPL-MCNC: 149 MG/DL
CO2 SERPL-SCNC: 28 MMOL/L (ref 21–32)
CREAT SERPL-MCNC: 0.93 MG/DL (ref 0.55–1.02)
EST. AVERAGE GLUCOSE BLD GHB EST-MCNC: 114 MG/DL
GLOBULIN SER CALC-MCNC: 2.9 G/DL (ref 2–4)
GLUCOSE SERPL-MCNC: 115 MG/DL (ref 65–100)
HBA1C MFR BLD: 5.6 % (ref 4–5.6)
HDLC SERPL-MCNC: 58 MG/DL
HDLC SERPL: 2.6 (ref 0–5)
LDLC SERPL CALC-MCNC: 66.8 MG/DL (ref 0–100)
POTASSIUM SERPL-SCNC: 4.1 MMOL/L (ref 3.5–5.1)
PROT SERPL-MCNC: 7 G/DL (ref 6.4–8.2)
SODIUM SERPL-SCNC: 141 MMOL/L (ref 136–145)
TRIGL SERPL-MCNC: 121 MG/DL
VLDLC SERPL CALC-MCNC: 24.2 MG/DL

## 2025-06-11 ENCOUNTER — OFFICE VISIT (OUTPATIENT)
Facility: CLINIC | Age: 65
End: 2025-06-11
Payer: MEDICARE

## 2025-06-11 VITALS
HEIGHT: 67 IN | RESPIRATION RATE: 16 BRPM | WEIGHT: 187 LBS | DIASTOLIC BLOOD PRESSURE: 83 MMHG | TEMPERATURE: 97.9 F | HEART RATE: 70 BPM | SYSTOLIC BLOOD PRESSURE: 128 MMHG | OXYGEN SATURATION: 98 % | BODY MASS INDEX: 29.35 KG/M2

## 2025-06-11 DIAGNOSIS — F51.01 PRIMARY INSOMNIA: ICD-10-CM

## 2025-06-11 DIAGNOSIS — E78.5 HYPERLIPIDEMIA, UNSPECIFIED HYPERLIPIDEMIA TYPE: ICD-10-CM

## 2025-06-11 DIAGNOSIS — E11.9 TYPE 2 DIABETES MELLITUS WITHOUT COMPLICATION, WITHOUT LONG-TERM CURRENT USE OF INSULIN (HCC): Primary | ICD-10-CM

## 2025-06-11 DIAGNOSIS — I10 ESSENTIAL (PRIMARY) HYPERTENSION: ICD-10-CM

## 2025-06-11 PROCEDURE — 99214 OFFICE O/P EST MOD 30 MIN: CPT | Performed by: FAMILY MEDICINE

## 2025-06-11 PROCEDURE — 3074F SYST BP LT 130 MM HG: CPT | Performed by: FAMILY MEDICINE

## 2025-06-11 PROCEDURE — G8419 CALC BMI OUT NRM PARAM NOF/U: HCPCS | Performed by: FAMILY MEDICINE

## 2025-06-11 PROCEDURE — 3044F HG A1C LEVEL LT 7.0%: CPT | Performed by: FAMILY MEDICINE

## 2025-06-11 PROCEDURE — 1090F PRES/ABSN URINE INCON ASSESS: CPT | Performed by: FAMILY MEDICINE

## 2025-06-11 PROCEDURE — 3017F COLORECTAL CA SCREEN DOC REV: CPT | Performed by: FAMILY MEDICINE

## 2025-06-11 PROCEDURE — G8399 PT W/DXA RESULTS DOCUMENT: HCPCS | Performed by: FAMILY MEDICINE

## 2025-06-11 PROCEDURE — G8427 DOCREV CUR MEDS BY ELIG CLIN: HCPCS | Performed by: FAMILY MEDICINE

## 2025-06-11 PROCEDURE — 1123F ACP DISCUSS/DSCN MKR DOCD: CPT | Performed by: FAMILY MEDICINE

## 2025-06-11 PROCEDURE — 3079F DIAST BP 80-89 MM HG: CPT | Performed by: FAMILY MEDICINE

## 2025-06-11 PROCEDURE — 2022F DILAT RTA XM EVC RTNOPTHY: CPT | Performed by: FAMILY MEDICINE

## 2025-06-11 PROCEDURE — 1036F TOBACCO NON-USER: CPT | Performed by: FAMILY MEDICINE

## 2025-06-11 NOTE — PROGRESS NOTES
for Cholesterol, Diabetic Check.    .       I have discussed the diagnosis with the patient and the intended plan as seen in the above orders.  The patient has received an after-visit summary and questions were answered concerning future plans.     Medication Side Effects and Warnings were discussed with patient,  Patient Labs were reviewed and or requested, and  Patient Past Records were reviewed and or requested  Yes         Pt agrees to call or return to clinic and/or go to closest ER with any worsening of symptoms.  This may include, but not limited to increased fever (>100.4) with NSAIDS or Tylenol, increased edema, confusion, rash, worsening of presenting symptoms.    Please note that this dictation was completed with [employer], the computer voice recognition software.  Quite often unanticipated grammatical, syntax, homophones, and other interpretive errors are inadvertently transcribed by the computer software.  Please disregard these errors.  Please excuse any errors that have escaped final proofreading.  Thank you.

## 2025-07-25 RX ORDER — AMLODIPINE BESYLATE 5 MG/1
5 TABLET ORAL DAILY
Qty: 90 TABLET | Refills: 2 | Status: SHIPPED | OUTPATIENT
Start: 2025-07-25

## 2025-08-11 RX ORDER — LISINOPRIL 40 MG/1
40 TABLET ORAL DAILY
Qty: 90 TABLET | Refills: 1 | Status: SHIPPED | OUTPATIENT
Start: 2025-08-11